# Patient Record
Sex: MALE | Race: BLACK OR AFRICAN AMERICAN | Employment: UNEMPLOYED
[De-identification: names, ages, dates, MRNs, and addresses within clinical notes are randomized per-mention and may not be internally consistent; named-entity substitution may affect disease eponyms.]

---

## 2017-01-10 ENCOUNTER — OFFICE VISIT (OUTPATIENT)
Dept: INTERNAL MEDICINE | Facility: CLINIC | Age: 57
End: 2017-01-10

## 2017-01-10 VITALS
BODY MASS INDEX: 29 KG/M2 | DIASTOLIC BLOOD PRESSURE: 78 MMHG | WEIGHT: 226 LBS | HEART RATE: 56 BPM | OXYGEN SATURATION: 98 % | HEIGHT: 74 IN | SYSTOLIC BLOOD PRESSURE: 120 MMHG

## 2017-01-10 DIAGNOSIS — Z23 NEEDS FLU SHOT: ICD-10-CM

## 2017-01-10 DIAGNOSIS — I10 ESSENTIAL HYPERTENSION: ICD-10-CM

## 2017-01-10 DIAGNOSIS — R73.03 PRE-DIABETES: Primary | ICD-10-CM

## 2017-01-10 PROCEDURE — 90688 IIV4 VACCINE SPLT 0.5 ML IM: CPT | Performed by: NURSE PRACTITIONER

## 2017-01-10 PROCEDURE — 90471 IMMUNIZATION ADMIN: CPT | Performed by: NURSE PRACTITIONER

## 2017-01-10 PROCEDURE — 99212 OFFICE O/P EST SF 10 MIN: CPT | Performed by: NURSE PRACTITIONER

## 2017-01-10 RX ORDER — METOPROLOL TARTRATE 50 MG/1
50 TABLET, FILM COATED ORAL 2 TIMES DAILY
Qty: 180 TABLET | Refills: 5 | Status: SHIPPED | OUTPATIENT
Start: 2017-01-10 | End: 2017-09-20 | Stop reason: SDUPTHER

## 2017-01-10 RX ORDER — AMLODIPINE BESYLATE 10 MG/1
10 TABLET ORAL DAILY
Qty: 90 TABLET | Refills: 5 | Status: SHIPPED | OUTPATIENT
Start: 2017-01-10 | End: 2017-09-20 | Stop reason: SDUPTHER

## 2017-01-10 RX ORDER — HYDROCHLOROTHIAZIDE 50 MG/1
50 TABLET ORAL DAILY
Qty: 90 TABLET | Refills: 5 | Status: SHIPPED | OUTPATIENT
Start: 2017-01-10 | End: 2017-09-20 | Stop reason: SDUPTHER

## 2017-01-10 ASSESSMENT — PATIENT HEALTH QUESTIONNAIRE - PHQ9
2. FEELING DOWN, DEPRESSED OR HOPELESS: 0
1. LITTLE INTEREST OR PLEASURE IN DOING THINGS: 0
SUM OF ALL RESPONSES TO PHQ QUESTIONS 1-9: 0
SUM OF ALL RESPONSES TO PHQ9 QUESTIONS 1 & 2: 0

## 2017-01-10 ASSESSMENT — ENCOUNTER SYMPTOMS: BLURRED VISION: 0

## 2017-02-03 ENCOUNTER — OFFICE VISIT (OUTPATIENT)
Dept: PODIATRY | Facility: CLINIC | Age: 57
End: 2017-02-03

## 2017-02-03 VITALS
HEIGHT: 74 IN | BODY MASS INDEX: 28.62 KG/M2 | WEIGHT: 223 LBS | HEART RATE: 95 BPM | TEMPERATURE: 97.7 F | SYSTOLIC BLOOD PRESSURE: 127 MMHG | DIASTOLIC BLOOD PRESSURE: 75 MMHG

## 2017-02-03 DIAGNOSIS — Q82.8 POROKERATOSIS: ICD-10-CM

## 2017-02-03 DIAGNOSIS — B35.3 TINEA PEDIS OF BOTH FEET: ICD-10-CM

## 2017-02-03 DIAGNOSIS — M79.672 PAIN IN BOTH FEET: ICD-10-CM

## 2017-02-03 DIAGNOSIS — M79.671 PAIN IN BOTH FEET: ICD-10-CM

## 2017-02-03 DIAGNOSIS — L84 CALLUS OF FOOT: Primary | ICD-10-CM

## 2017-02-03 PROCEDURE — 99213 OFFICE O/P EST LOW 20 MIN: CPT | Performed by: PODIATRIST

## 2017-02-03 RX ORDER — KETOCONAZOLE 20 MG/G
CREAM TOPICAL
Qty: 30 G | Refills: 1 | Status: SHIPPED | OUTPATIENT
Start: 2017-02-03 | End: 2017-11-13 | Stop reason: ALTCHOICE

## 2017-02-21 DIAGNOSIS — I10 ESSENTIAL HYPERTENSION: ICD-10-CM

## 2017-02-21 RX ORDER — HYDROCHLOROTHIAZIDE 50 MG/1
TABLET ORAL
Qty: 90 TABLET | Refills: 2 | Status: SHIPPED | OUTPATIENT
Start: 2017-02-21 | End: 2017-06-09 | Stop reason: SDUPTHER

## 2017-06-09 ENCOUNTER — PROCEDURE VISIT (OUTPATIENT)
Dept: PODIATRY | Age: 57
End: 2017-06-09
Payer: MEDICAID

## 2017-06-09 VITALS
TEMPERATURE: 97.8 F | HEIGHT: 74 IN | SYSTOLIC BLOOD PRESSURE: 122 MMHG | DIASTOLIC BLOOD PRESSURE: 60 MMHG | WEIGHT: 200 LBS | BODY MASS INDEX: 25.67 KG/M2 | HEART RATE: 57 BPM

## 2017-06-09 DIAGNOSIS — M79.672 PAIN IN BOTH FEET: ICD-10-CM

## 2017-06-09 DIAGNOSIS — L84 HELOMA MOLLE: ICD-10-CM

## 2017-06-09 DIAGNOSIS — L84 CALLUS OF FOOT: ICD-10-CM

## 2017-06-09 DIAGNOSIS — M79.671 PAIN IN BOTH FEET: ICD-10-CM

## 2017-06-09 DIAGNOSIS — M20.11 HAV (HALLUX ABDUCTO VALGUS), RIGHT: Primary | ICD-10-CM

## 2017-06-09 DIAGNOSIS — Z87.39 S/P HAMMER TOE CORRECTION: ICD-10-CM

## 2017-06-09 DIAGNOSIS — M20.12 HAV (HALLUX ABDUCTO VALGUS), LEFT: ICD-10-CM

## 2017-06-09 DIAGNOSIS — Z98.890 S/P HAMMER TOE CORRECTION: ICD-10-CM

## 2017-06-09 PROCEDURE — 11056 PARNG/CUTG B9 HYPRKR LES 2-4: CPT | Performed by: PODIATRIST

## 2017-06-09 PROCEDURE — 99213 OFFICE O/P EST LOW 20 MIN: CPT | Performed by: PODIATRIST

## 2017-06-09 RX ORDER — UBIDECARENONE 75 MG
1000 CAPSULE ORAL DAILY
COMMUNITY
End: 2017-11-13 | Stop reason: ALTCHOICE

## 2017-06-12 ENCOUNTER — HOSPITAL ENCOUNTER (OUTPATIENT)
Age: 57
Discharge: HOME | End: 2017-06-12
Payer: MEDICAID

## 2017-06-12 ENCOUNTER — HOSPITAL ENCOUNTER (OUTPATIENT)
Dept: GENERAL RADIOLOGY | Age: 57
Discharge: HOME | End: 2017-06-12
Payer: MEDICAID

## 2017-06-12 DIAGNOSIS — R52 PAIN: ICD-10-CM

## 2017-06-12 DIAGNOSIS — M20.11 HAV (HALLUX ABDUCTO VALGUS), RIGHT: ICD-10-CM

## 2017-06-12 DIAGNOSIS — M79.672 PAIN IN BOTH FEET: ICD-10-CM

## 2017-06-12 DIAGNOSIS — L84 HELOMA MOLLE: ICD-10-CM

## 2017-06-12 DIAGNOSIS — M79.671 PAIN IN BOTH FEET: ICD-10-CM

## 2017-06-12 DIAGNOSIS — M20.12 HAV (HALLUX ABDUCTO VALGUS), LEFT: ICD-10-CM

## 2017-06-12 DIAGNOSIS — L84 CALLUS OF FOOT: ICD-10-CM

## 2017-06-12 DIAGNOSIS — Z98.890 S/P HAMMER TOE CORRECTION: ICD-10-CM

## 2017-06-12 DIAGNOSIS — Z87.39 S/P HAMMER TOE CORRECTION: ICD-10-CM

## 2017-06-12 PROCEDURE — 73630 X-RAY EXAM OF FOOT: CPT

## 2017-07-14 ENCOUNTER — OFFICE VISIT (OUTPATIENT)
Dept: PODIATRY | Age: 57
End: 2017-07-14
Payer: MEDICAID

## 2017-07-14 VITALS
DIASTOLIC BLOOD PRESSURE: 70 MMHG | WEIGHT: 200 LBS | HEART RATE: 52 BPM | SYSTOLIC BLOOD PRESSURE: 109 MMHG | BODY MASS INDEX: 25.67 KG/M2 | HEIGHT: 74 IN

## 2017-07-14 DIAGNOSIS — M20.41 HAMMER TOES OF BOTH FEET: ICD-10-CM

## 2017-07-14 DIAGNOSIS — L84 CALLUS OF FOOT: ICD-10-CM

## 2017-07-14 DIAGNOSIS — M79.671 PAIN IN BOTH FEET: ICD-10-CM

## 2017-07-14 DIAGNOSIS — M20.12 HAV (HALLUX ABDUCTO VALGUS), LEFT: Primary | ICD-10-CM

## 2017-07-14 DIAGNOSIS — M20.42 HAMMER TOES OF BOTH FEET: ICD-10-CM

## 2017-07-14 DIAGNOSIS — E11.9 TYPE 2 DIABETES MELLITUS WITHOUT COMPLICATION, WITHOUT LONG-TERM CURRENT USE OF INSULIN (HCC): ICD-10-CM

## 2017-07-14 DIAGNOSIS — M20.11 HAV (HALLUX ABDUCTO VALGUS), RIGHT: ICD-10-CM

## 2017-07-14 DIAGNOSIS — M79.672 PAIN IN BOTH FEET: ICD-10-CM

## 2017-07-14 PROCEDURE — 11056 PARNG/CUTG B9 HYPRKR LES 2-4: CPT | Performed by: PODIATRIST

## 2017-07-14 PROCEDURE — 99213 OFFICE O/P EST LOW 20 MIN: CPT | Performed by: PODIATRIST

## 2017-07-27 ENCOUNTER — HOSPITAL ENCOUNTER (OUTPATIENT)
Dept: PREADMISSION TESTING | Age: 57
Discharge: HOME | End: 2017-07-27
Payer: MEDICAID

## 2017-07-27 VITALS
BODY MASS INDEX: 26.69 KG/M2 | DIASTOLIC BLOOD PRESSURE: 69 MMHG | TEMPERATURE: 98.4 F | SYSTOLIC BLOOD PRESSURE: 109 MMHG | RESPIRATION RATE: 18 BRPM | HEART RATE: 64 BPM | WEIGHT: 208 LBS | OXYGEN SATURATION: 98 % | HEIGHT: 74 IN

## 2017-07-27 LAB
ANION GAP SERPL CALCULATED.3IONS-SCNC: 20 MMOL/L (ref 9–17)
BUN BLDV-MCNC: 15 MG/DL (ref 6–20)
CHLORIDE BLD-SCNC: 103 MMOL/L (ref 98–107)
CO2: 22 MMOL/L (ref 20–31)
CREAT SERPL-MCNC: 1.27 MG/DL (ref 0.7–1.2)
GFR AFRICAN AMERICAN: >60 ML/MIN
GFR NON-AFRICAN AMERICAN: 58 ML/MIN
GFR SERPL CREATININE-BSD FRML MDRD: ABNORMAL ML/MIN/{1.73_M2}
GFR SERPL CREATININE-BSD FRML MDRD: ABNORMAL ML/MIN/{1.73_M2}
GLUCOSE BLD-MCNC: 94 MG/DL (ref 70–99)
POTASSIUM SERPL-SCNC: 3.8 MMOL/L (ref 3.7–5.3)
SODIUM BLD-SCNC: 145 MMOL/L (ref 135–144)

## 2017-07-27 PROCEDURE — 36415 COLL VENOUS BLD VENIPUNCTURE: CPT

## 2017-07-27 PROCEDURE — 82565 ASSAY OF CREATININE: CPT

## 2017-07-27 PROCEDURE — 80051 ELECTROLYTE PANEL: CPT

## 2017-07-27 PROCEDURE — 82947 ASSAY GLUCOSE BLOOD QUANT: CPT

## 2017-07-27 PROCEDURE — 93005 ELECTROCARDIOGRAM TRACING: CPT

## 2017-07-27 PROCEDURE — 84520 ASSAY OF UREA NITROGEN: CPT

## 2017-07-27 RX ORDER — SODIUM CHLORIDE, SODIUM LACTATE, POTASSIUM CHLORIDE, CALCIUM CHLORIDE 600; 310; 30; 20 MG/100ML; MG/100ML; MG/100ML; MG/100ML
1000 INJECTION, SOLUTION INTRAVENOUS CONTINUOUS
Status: CANCELLED | OUTPATIENT
Start: 2017-07-27

## 2017-07-28 LAB
EKG ATRIAL RATE: 53 BPM
EKG P AXIS: 59 DEGREES
EKG P-R INTERVAL: 180 MS
EKG Q-T INTERVAL: 424 MS
EKG QRS DURATION: 80 MS
EKG QTC CALCULATION (BAZETT): 397 MS
EKG R AXIS: 33 DEGREES
EKG T AXIS: 10 DEGREES
EKG VENTRICULAR RATE: 53 BPM

## 2017-09-20 ENCOUNTER — TELEPHONE (OUTPATIENT)
Dept: INTERNAL MEDICINE | Age: 57
End: 2017-09-20

## 2017-09-20 DIAGNOSIS — R73.03 PRE-DIABETES: ICD-10-CM

## 2017-09-20 DIAGNOSIS — K21.9 GASTROESOPHAGEAL REFLUX DISEASE, ESOPHAGITIS PRESENCE NOT SPECIFIED: ICD-10-CM

## 2017-09-20 DIAGNOSIS — I10 ESSENTIAL HYPERTENSION: ICD-10-CM

## 2017-09-20 NOTE — TELEPHONE ENCOUNTER
----- Message from Phill Colbert sent at 9/20/2017 10:06 AM EDT -----  Contact: patient  Patient is scheduled 11/13 w/ Dr Marie Garcia. He is requesting orders for blood work prior to the appt.     Health Maintenance   Topic Date Due    Hepatitis C screen  1960    HIV screen  08/30/1973    Colon Cancer Screen FIT/FOBT  06/28/2017    Flu vaccine (1) 09/01/2017    Lipid screen  12/14/2020    DTaP/Tdap/Td vaccine (2 - Td) 09/13/2026    Pneumococcal med risk  Completed             (applicable per patient's age: Cancer Screenings, Depression Screening, Fall Risk Screening, Immunizations)    Hemoglobin A1C (%)   Date Value   06/28/2016 6.0   12/08/2015 5.8   04/08/2015 5.8     LDL Cholesterol (mg/dL)   Date Value   12/14/2015 65     AST (U/L)   Date Value   12/14/2015 17     ALT (U/L)   Date Value   12/14/2015 19     BUN (mg/dL)   Date Value   07/27/2017 15      (goal A1C is < 7)   (goal LDL is <100) need 30-50% reduction from baseline     BP Readings from Last 3 Encounters:   07/27/17 109/69   07/14/17 109/70   06/09/17 122/60    (goal /80)      All Future Testing planned in [facility]:  Lab Frequency Next Occurrence       Next Visit Date:  Future Appointments  Date Time Provider Sheldon Eubanks   11/13/2017 1:00 PM Quinten Palma MD [facility]            Patient Active Problem List:     Callus of foot     S/P hammer toe correction     Pre-diabetes     Gastroesophageal reflux disease     Essential hypertension

## 2017-09-21 RX ORDER — HYDROCHLOROTHIAZIDE 50 MG/1
50 TABLET ORAL DAILY
Qty: 90 TABLET | Refills: 5 | Status: SHIPPED | OUTPATIENT
Start: 2017-09-21 | End: 2017-11-13

## 2017-09-21 RX ORDER — AMLODIPINE BESYLATE 10 MG/1
10 TABLET ORAL DAILY
Qty: 90 TABLET | Refills: 5 | Status: SHIPPED | OUTPATIENT
Start: 2017-09-21 | End: 2018-04-30 | Stop reason: SDUPTHER

## 2017-09-21 RX ORDER — METOPROLOL TARTRATE 50 MG/1
50 TABLET, FILM COATED ORAL 2 TIMES DAILY
Qty: 180 TABLET | Refills: 5 | Status: SHIPPED | OUTPATIENT
Start: 2017-09-21 | End: 2018-04-30 | Stop reason: SDUPTHER

## 2017-09-21 RX ORDER — OMEPRAZOLE 10 MG/1
10 CAPSULE, DELAYED RELEASE ORAL DAILY
Qty: 90 CAPSULE | Refills: 5 | Status: SHIPPED | OUTPATIENT
Start: 2017-09-21 | End: 2018-04-30 | Stop reason: SDUPTHER

## 2017-10-06 ENCOUNTER — OFFICE VISIT (OUTPATIENT)
Dept: PODIATRY | Age: 57
End: 2017-10-06
Payer: MEDICAID

## 2017-10-06 VITALS
HEIGHT: 74 IN | BODY MASS INDEX: 26.6 KG/M2 | SYSTOLIC BLOOD PRESSURE: 121 MMHG | HEART RATE: 58 BPM | TEMPERATURE: 97.9 F | WEIGHT: 207.23 LBS | DIASTOLIC BLOOD PRESSURE: 69 MMHG

## 2017-10-06 DIAGNOSIS — Z87.39 S/P HAMMER TOE CORRECTION: ICD-10-CM

## 2017-10-06 DIAGNOSIS — Z98.890 S/P HAMMER TOE CORRECTION: ICD-10-CM

## 2017-10-06 DIAGNOSIS — B35.1 ONYCHOMYCOSIS: ICD-10-CM

## 2017-10-06 DIAGNOSIS — M79.672 PAIN IN BOTH FEET: ICD-10-CM

## 2017-10-06 DIAGNOSIS — L84 HELOMA MOLLE: ICD-10-CM

## 2017-10-06 DIAGNOSIS — L84 CALLUS OF FOOT: ICD-10-CM

## 2017-10-06 DIAGNOSIS — E11.9 TYPE 2 DIABETES MELLITUS WITHOUT COMPLICATION, WITHOUT LONG-TERM CURRENT USE OF INSULIN (HCC): ICD-10-CM

## 2017-10-06 DIAGNOSIS — M20.12 HAV (HALLUX ABDUCTO VALGUS), LEFT: Primary | ICD-10-CM

## 2017-10-06 DIAGNOSIS — M20.41 HAMMER TOES OF BOTH FEET: ICD-10-CM

## 2017-10-06 DIAGNOSIS — M79.671 PAIN IN BOTH FEET: ICD-10-CM

## 2017-10-06 DIAGNOSIS — M20.11 HAV (HALLUX ABDUCTO VALGUS), RIGHT: ICD-10-CM

## 2017-10-06 DIAGNOSIS — M20.42 HAMMER TOES OF BOTH FEET: ICD-10-CM

## 2017-10-06 PROCEDURE — 11721 DEBRIDE NAIL 6 OR MORE: CPT | Performed by: PODIATRIST

## 2017-10-06 PROCEDURE — 99213 OFFICE O/P EST LOW 20 MIN: CPT | Performed by: PODIATRIST

## 2017-10-06 PROCEDURE — 11057 PARNG/CUTG B9 HYPRKR LES >4: CPT | Performed by: PODIATRIST

## 2017-10-06 RX ORDER — UREA 200 MG/G
GEL TOPICAL
Qty: 480 G | Refills: 3 | Status: SHIPPED | OUTPATIENT
Start: 2017-10-06 | End: 2018-01-26 | Stop reason: SDUPTHER

## 2017-10-06 NOTE — PROGRESS NOTES
Diabetic visit information    Blood pressure (Control is BP <140/90)  BP Readings from Last 3 Encounters:   10/06/17 121/69   07/27/17 109/69   07/14/17 109/70       BP taken with correct size cuff? - Yes   Repeated if > 140/90 NA      Tobacco use:  Patient  reports that he has been smoking. He has never used smokeless tobacco.  If Smoker - Cessation materials given?- NA       Diabetic Health Maintenance Items due  There are no preventive care reminders to display for this patient. Diabetic retinal exam done in last year? - Yes   If No: remind patient that it is due and they should schedule an exam    Medications  Is patient taking any medications for diabetes? -   Yes  Have blood sugars been controlled? Fasting blood sugars under 120   -   Patient don't check BS at home    Random home sugars or today's POCT glucose is under 180 -   No   []  If No to the above then patient should schedule appt with PCP. Diabetic Plan    A1C Plan  Lab Results   Component Value Date    LABA1C 6.0 06/28/2016    LABA1C 5.8 12/08/2015    LABA1C 5.8 04/08/2015      []  If A1C over 8 and last result >3 months ago - Order A1C and refer to PCP   [x]  If last A1C over 6 months ago - Order A1C and refer to PCP for follow up   []  If elevated blood sugars > 180 - refer to PCP for follow up    []  Blood sugar controlled - A1C under 8 and last check was < 6 months      Cholesterol Plan   Lab Results   Component Value Date    LDLCHOLESTEROL 65 12/14/2015      []  If LDL > 100 and last result >3 months ago - order Fasting lipids and refer to PCP for follow up   [x]  If LDL < 100 and over 1 year ago - Order Fasting lipids and refer to PCP for follow up   [] LDL is controlled.   LDL < 100 and checked within the last year     Blood Pressure  BP Readings from Last 3 Encounters:   10/06/17 121/69   07/27/17 109/69   07/14/17 109/70      []  If SBP >140 mmhg - refer to PCP for follow up   []  If DBP > 90 mmhg - refer to PCP for follow up   [x] BP is controlled <140/90     Order labs as PCP ordered.   (ie: Lipids, A1C, CMP)

## 2017-10-06 NOTE — MR AVS SNAPSHOT
Tdap (Boostrix, Adacel) 9/13/2016      Preventive Care        Date Due    Hepatitis C screening is recommended for all adults regardless of risk factors born between [facility] at least once (lifetime) who have never been tested. 1960    HIV screening is recommended for all people regardless of risk factors  aged 15-65 years at least once (lifetime) who have never been HIV tested. 8/30/1973    Colon Cancer Stool Test 6/28/2017    Yearly Flu Vaccine (1) 9/1/2017    Cholesterol Screening 12/14/2020    Tetanus Combination Vaccine (2 - Td) 9/13/2026            [employer] Signup           Our records indicate that you have an active [email or web address] account. You can view your After Visit Summary by going to [email or web address] and logging in with your [email or web address] username and password. If you don't have a [email or web address] username and password but a parent or guardian has access to your record, the parent or guardian should login with their own [email or web address] username and password and access your record to view the After Visit Summary. Additional Information  If you have questions, please contact the physician practice where you receive care. Remember, [email or web address] is NOT to be used for urgent needs. For medical emergencies, dial 911. For questions regarding your [email or web address] account call [phone]. If you have a clinical question, please call your doctor's office.

## 2017-10-06 NOTE — PROGRESS NOTES
[facility] Progress Note    Subjective: Leticia Carias is a 59 y.o. male presenting to clinic complaining of painful nails and calluses to b/l feet. States he had Left bunion and hammertoe surgery circa 2010. He states he would like surgical revision by Dr. Saira Andrews in a few months once his life slows down. He states he uses orthotics but that he still has pain. He has another callus that is on the inside of his second toe and big toe. Denies numbness, tingling, and burning in the hands and feet. Denies cramping in the calves with walking. Denies open wounds. Admits to smoking cigarettes about 1 pack over 7-days. Patient states he has been apply moisterizer cream and antifungal nail lacquer and requests refills. PCP is Yuliya Smith MD    Objective:  Vitals:    10/06/17 1505   BP: 121/69   Pulse: 58   Temp: 97.9 °F (36.6 °C)     Derm: Hyperkeratotic tissue noted sub 2nd and 3rd met heads, R and sub 3rd met head left. Previous cicatrix to dorsal first met and to the dorsal aspects of digits 2-4, devante. Nails normal in length, however thickened 1-5, devante. No open lesions noted. Callus at medial IPJ of 2nd digit. Vasc: DP and PT pulses are palpable 2/4, devante. CFT <3 seconds to the digits, devante. No edema or erythema noted, devante. Neuro: Sensation intact to light touch and protective sensation present bilaterally. MSK: Muscle strength 5/5 to all LE muscle groups. Pain on palpation to thickened hyperkeratotic tissue sub 2-3 met heads, R and 3rd met head, L. Dorsally contracted digits 2-5 on the right. 2nd digit overlapping hallux on the right with painful calluses between. Lab Results   Component Value Date    LABA1C 6.0 06/28/2016     Lab Results   Component Value Date     04/08/2015     Assessment:  1. HAV (hallux abducto valgus), left     2. Callus of foot  TRIM BENIGN HYPERKERATOTIC SKIN LESION,>4   3. Hammer toes of both feet     4.  Pain in both feet  00783 - [place] DEBRIDEMENT OF NAILS, 6 OR MORE    TRIM BENIGN HYPERKERATOTIC SKIN LESION,>4   5. HAV (hallux abducto valgus), right     6. Type 2 diabetes mellitus without complication, without long-term current use of insulin (HCC)     7. S/P hammer toe correction     8. Heloma molle  TRIM BENIGN HYPERKERATOTIC SKIN LESION,>4   9. Onychomycosis  53571 - [place] DEBRIDEMENT OF NAILS, 6 OR MORE       Plan:   · Pt was evaluated and examined. · Trimmed hyperkeratotic lesions x 4 with #10 blade without incident. · Debrided nails 1-5 bilateral in length and thickness WOI  · Rx ciclopirox and urea cream  · Discussed smoking cessation prior to elective bunion and hammertoe surgery  · RTC 3 months   · Discussed with Dr. Des Dailey    Electronically signed by Chu Franco DPM on 10/6/2017 at 3:15 PM      I performed a history and physical examination of the patient and discussed management with the resident. I reviewed the residents note and agree with the documented findings and plan of care. Any areas of disagreement are noted on the chart. I was personally present for the key portions of any procedures. I have documented in the chart those procedures where I was not present during the key portions. I have reviewed the Podiatry Resident progress note. I agree with the chief complaint, past medical history, past surgical history, allergies, medications, social and family history as documented unless otherwise noted below. Documentation of the HPI, Physical Exam and Medical Decision Making performed by medical students or scribes is based on my personal performance of the HPI, PE and MDM. I have personally evaluated this patient and have completed at least one if not all key elements of the E/M (history, physical exam, and MDM). Additional findings are as noted. Bhanu Momin D.P.M.

## 2017-11-13 ENCOUNTER — OFFICE VISIT (OUTPATIENT)
Dept: INTERNAL MEDICINE | Age: 57
End: 2017-11-13
Payer: MEDICAID

## 2017-11-13 VITALS
HEART RATE: 59 BPM | DIASTOLIC BLOOD PRESSURE: 78 MMHG | BODY MASS INDEX: 28.29 KG/M2 | WEIGHT: 220.4 LBS | SYSTOLIC BLOOD PRESSURE: 121 MMHG

## 2017-11-13 DIAGNOSIS — Z23 NEEDS FLU SHOT: ICD-10-CM

## 2017-11-13 DIAGNOSIS — Z11.59 NEED FOR HEPATITIS C SCREENING TEST: ICD-10-CM

## 2017-11-13 DIAGNOSIS — I10 ESSENTIAL HYPERTENSION: ICD-10-CM

## 2017-11-13 DIAGNOSIS — Z12.11 COLON CANCER SCREENING: ICD-10-CM

## 2017-11-13 DIAGNOSIS — R73.03 PRE-DIABETES: Primary | ICD-10-CM

## 2017-11-13 DIAGNOSIS — Z11.4 SCREENING FOR HIV WITHOUT PRESENCE OF RISK FACTORS: ICD-10-CM

## 2017-11-13 DIAGNOSIS — Z13.220 SCREENING FOR HYPERLIPIDEMIA: ICD-10-CM

## 2017-11-13 LAB — HBA1C MFR BLD: 6.1 %

## 2017-11-13 PROCEDURE — 3017F COLORECTAL CA SCREEN DOC REV: CPT | Performed by: STUDENT IN AN ORGANIZED HEALTH CARE EDUCATION/TRAINING PROGRAM

## 2017-11-13 PROCEDURE — 4004F PT TOBACCO SCREEN RCVD TLK: CPT | Performed by: STUDENT IN AN ORGANIZED HEALTH CARE EDUCATION/TRAINING PROGRAM

## 2017-11-13 PROCEDURE — 83036 HEMOGLOBIN GLYCOSYLATED A1C: CPT | Performed by: INTERNAL MEDICINE

## 2017-11-13 PROCEDURE — 90471 IMMUNIZATION ADMIN: CPT | Performed by: STUDENT IN AN ORGANIZED HEALTH CARE EDUCATION/TRAINING PROGRAM

## 2017-11-13 PROCEDURE — 90688 IIV4 VACCINE SPLT 0.5 ML IM: CPT | Performed by: STUDENT IN AN ORGANIZED HEALTH CARE EDUCATION/TRAINING PROGRAM

## 2017-11-13 PROCEDURE — 99214 OFFICE O/P EST MOD 30 MIN: CPT | Performed by: STUDENT IN AN ORGANIZED HEALTH CARE EDUCATION/TRAINING PROGRAM

## 2017-11-13 PROCEDURE — G8484 FLU IMMUNIZE NO ADMIN: HCPCS | Performed by: STUDENT IN AN ORGANIZED HEALTH CARE EDUCATION/TRAINING PROGRAM

## 2017-11-13 PROCEDURE — G8427 DOCREV CUR MEDS BY ELIG CLIN: HCPCS | Performed by: STUDENT IN AN ORGANIZED HEALTH CARE EDUCATION/TRAINING PROGRAM

## 2017-11-13 PROCEDURE — G8417 CALC BMI ABV UP PARAM F/U: HCPCS | Performed by: STUDENT IN AN ORGANIZED HEALTH CARE EDUCATION/TRAINING PROGRAM

## 2017-11-13 RX ORDER — BLOOD PRESSURE TEST KIT
KIT MISCELLANEOUS
Qty: 1 KIT | Refills: 0 | Status: SHIPPED | OUTPATIENT
Start: 2017-11-13 | End: 2019-01-18

## 2017-11-13 NOTE — PATIENT INSTRUCTIONS
Printed script for blood pressure cuff given to patient with a copy of the office notes      Your medications for this visit were escribed to your preferred pharmacy. You have been given a lab order no need to schedule please fast 12 hours     Your referral for Gastroenterology (Colonoscopy)was sent to [facility] clinic you will be contacted with an appt     Avs was given and reviewed appt card given with next appt.  MM

## 2017-11-13 NOTE — PROGRESS NOTES
Visit Information    Have you changed or started any medications since your last visit including any over-the-counter medicines, vitamins, or herbal medicines? no   Are you having any side effects from any of your medications? -  no  Have you stopped taking any of your medications? Is so, why? -  no    Have you seen any other physician or provider since your last visit? Yes - Records Obtained  Have you had any other diagnostic tests since your last visit? Yes - Records Obtained  Have you been seen in the emergency room and/or had an admission to a hospital since we last saw you? No  Have you had your routine dental cleaning in the past 6 months? no    Have you activated your [employer] account? If not, what are your barriers?  Yes     Patient Care Team:  Les Jamison MD as PCP - General (Internal Medicine)    Medical History Review  Past Medical, Family, and Social History reviewed and does contribute to the patient presenting condition    Health Maintenance   Topic Date Due    Hepatitis C screen  1960    Diabetic foot exam  08/30/1968    Diabetic retinal exam  08/30/1968    HIV screen  08/30/1973    Diabetic microalbuminuria test  08/30/1976    Lipid screen  12/14/2016    Diabetic hemoglobin A1C test  06/28/2017    Colon Cancer Screen FIT/FOBT  06/28/2017    Flu vaccine (1) 09/01/2017    DTaP/Tdap/Td vaccine (2 - Td) 09/13/2026    Pneumococcal med risk  Completed
editing.
S2, no murmurs, rubs, clicks or gallops  Abdomen - soft, nontender, nondistended, no masses or organomegaly  Neurological - alert, oriented, normal speech, no focal findings or movement disorder noted  Extremities - peripheral pulses normal, no pedal edema, no clubbing or cyanosis  Skin - normal coloration and turgor, no rashes, no suspicious skin lesions noted    LABORATORY FINDINGS:    CBC:  Lab Results   Component Value Date    WBC 4.3 04/08/2015    HGB 14.0 04/08/2015     04/08/2015       BMP:    Lab Results   Component Value Date     07/27/2017    K 3.8 07/27/2017     07/27/2017    CO2 22 07/27/2017    BUN 15 07/27/2017    CREATININE 1.27 07/27/2017    GLUCOSE 94 07/27/2017    GLUCOSE 89 02/02/2012       HEMOGLOBIN A1C:   Lab Results   Component Value Date    LABA1C 6.1 11/13/2017       FASTING LIPID PANEL:  Lab Results   Component Value Date    CHOL 118 12/14/2015    HDL 38 (L) 12/14/2015    TRIG 75 12/14/2015       ASSESSMENT AND PLAN:    rBeana Woodward was seen today for hypertension, health maintenance and discuss medications. Diagnoses and all orders for this visit:    Pre-diabetes  -     POCT glycosylated hemoglobin (Hb A1C)  -      DIABETES FOOT EXAM  -     Microalbumin, Ur; Future  -     Basic Metabolic Panel; Future  -     CBC; Future  -     Vitamin B12 & Folate; Future    Needs flu shot  -     INFLUENZA, QUADV, 6 MO AND OLDER, IM, MDV, 0.5ML (FLULAVAL QUADV)  -     [place] ADMIN INFLUENZA VIRUS VAC    Screening for HIV without presence of risk factors  -     HIV Screen; Future    Need for hepatitis C screening test  -     Hepatitis C Antibody; Future    Screening for hyperlipidemia  -     Lipid Panel; Future    Essential hypertension       -     Continue lisinopril, metoprolol . D/c HCTZ. Blood pressure machine to check his blood pressure. Patient was advised to keep checking BP and if SBP more than 150/90 to call our office.        Screening colonoscopy     FOLLOW UP AND INSTRUCTIONS:   No

## 2017-11-20 ENCOUNTER — HOSPITAL ENCOUNTER (OUTPATIENT)
Age: 57
Discharge: HOME | End: 2017-11-20
Payer: MEDICAID

## 2017-11-20 DIAGNOSIS — R73.03 PRE-DIABETES: ICD-10-CM

## 2017-11-20 DIAGNOSIS — Z13.220 SCREENING FOR HYPERLIPIDEMIA: ICD-10-CM

## 2017-11-20 DIAGNOSIS — Z11.4 SCREENING FOR HIV WITHOUT PRESENCE OF RISK FACTORS: ICD-10-CM

## 2017-11-20 DIAGNOSIS — Z11.59 NEED FOR HEPATITIS C SCREENING TEST: ICD-10-CM

## 2017-11-20 LAB
ANION GAP SERPL CALCULATED.3IONS-SCNC: 13 MMOL/L (ref 9–17)
BUN BLDV-MCNC: 9 MG/DL (ref 6–20)
BUN/CREAT BLD: NORMAL (ref 9–20)
CALCIUM SERPL-MCNC: 9.6 MG/DL (ref 8.6–10.4)
CHLORIDE BLD-SCNC: 105 MMOL/L (ref 98–107)
CHOLESTEROL/HDL RATIO: 3.2
CHOLESTEROL: 139 MG/DL
CO2: 24 MMOL/L (ref 20–31)
CREAT SERPL-MCNC: 1.19 MG/DL (ref 0.7–1.2)
CREATININE URINE: 225.5 MG/DL (ref 39–259)
FOLATE: 15 NG/ML
GFR AFRICAN AMERICAN: >60 ML/MIN
GFR NON-AFRICAN AMERICAN: >60 ML/MIN
GFR SERPL CREATININE-BSD FRML MDRD: NORMAL ML/MIN/{1.73_M2}
GFR SERPL CREATININE-BSD FRML MDRD: NORMAL ML/MIN/{1.73_M2}
GLUCOSE BLD-MCNC: 85 MG/DL (ref 70–99)
HCT VFR BLD CALC: 42.8 % (ref 40.7–50.3)
HDLC SERPL-MCNC: 43 MG/DL
HEMOGLOBIN: 14.2 G/DL (ref 13–17)
HEPATITIS C ANTIBODY: NONREACTIVE
HIV AG/AB: NONREACTIVE
LDL CHOLESTEROL: 78 MG/DL (ref 0–130)
MCH RBC QN AUTO: 28.9 PG (ref 25.2–33.5)
MCHC RBC AUTO-ENTMCNC: 33.2 G/DL (ref 28.4–34.8)
MCV RBC AUTO: 87 FL (ref 82.6–102.9)
MICROALBUMIN/CREAT 24H UR: <12 MG/L
MICROALBUMIN/CREAT UR-RTO: NORMAL MCG/MG CREAT
PDW BLD-RTO: 12.9 % (ref 11.8–14.4)
PLATELET # BLD: 208 K/UL (ref 138–453)
PMV BLD AUTO: 9.2 FL (ref 8.1–13.5)
POTASSIUM SERPL-SCNC: 3.7 MMOL/L (ref 3.7–5.3)
RBC # BLD: 4.92 M/UL (ref 4.21–5.77)
SODIUM BLD-SCNC: 142 MMOL/L (ref 135–144)
TRIGL SERPL-MCNC: 91 MG/DL
VITAMIN B-12: 966 PG/ML (ref 211–946)
VLDLC SERPL CALC-MCNC: NORMAL MG/DL (ref 1–30)
WBC # BLD: 3.6 K/UL (ref 3.5–11.3)

## 2017-11-20 PROCEDURE — 80048 BASIC METABOLIC PNL TOTAL CA: CPT

## 2017-11-20 PROCEDURE — 86803 HEPATITIS C AB TEST: CPT

## 2017-11-20 PROCEDURE — 82746 ASSAY OF FOLIC ACID SERUM: CPT

## 2017-11-20 PROCEDURE — 82043 UR ALBUMIN QUANTITATIVE: CPT

## 2017-11-20 PROCEDURE — 87389 HIV-1 AG W/HIV-1&-2 AB AG IA: CPT

## 2017-11-20 PROCEDURE — 36415 COLL VENOUS BLD VENIPUNCTURE: CPT

## 2017-11-20 PROCEDURE — 82607 VITAMIN B-12: CPT

## 2017-11-20 PROCEDURE — 85027 COMPLETE CBC AUTOMATED: CPT

## 2017-11-20 PROCEDURE — 80061 LIPID PANEL: CPT

## 2017-11-20 PROCEDURE — 82570 ASSAY OF URINE CREATININE: CPT

## 2018-01-26 ENCOUNTER — OFFICE VISIT (OUTPATIENT)
Dept: PODIATRY | Age: 58
End: 2018-01-26
Payer: MEDICAID

## 2018-01-26 VITALS
WEIGHT: 200 LBS | SYSTOLIC BLOOD PRESSURE: 128 MMHG | BODY MASS INDEX: 25.67 KG/M2 | HEART RATE: 60 BPM | DIASTOLIC BLOOD PRESSURE: 70 MMHG | HEIGHT: 74 IN

## 2018-01-26 DIAGNOSIS — B35.1 ONYCHOMYCOSIS: ICD-10-CM

## 2018-01-26 DIAGNOSIS — M79.672 PAIN IN BOTH FEET: ICD-10-CM

## 2018-01-26 DIAGNOSIS — M20.41 HAMMER TOES OF BOTH FEET: ICD-10-CM

## 2018-01-26 DIAGNOSIS — M20.42 HAMMER TOES OF BOTH FEET: ICD-10-CM

## 2018-01-26 DIAGNOSIS — E11.9 TYPE 2 DIABETES MELLITUS WITHOUT COMPLICATION, WITHOUT LONG-TERM CURRENT USE OF INSULIN (HCC): ICD-10-CM

## 2018-01-26 DIAGNOSIS — M20.12 HAV (HALLUX ABDUCTO VALGUS), LEFT: ICD-10-CM

## 2018-01-26 DIAGNOSIS — M20.11 HAV (HALLUX ABDUCTO VALGUS), RIGHT: ICD-10-CM

## 2018-01-26 DIAGNOSIS — L84 CALLUS OF FOOT: Primary | ICD-10-CM

## 2018-01-26 DIAGNOSIS — M79.671 PAIN IN BOTH FEET: ICD-10-CM

## 2018-01-26 PROCEDURE — G8482 FLU IMMUNIZE ORDER/ADMIN: HCPCS | Performed by: PODIATRIST

## 2018-01-26 PROCEDURE — 99213 OFFICE O/P EST LOW 20 MIN: CPT | Performed by: PODIATRIST

## 2018-01-26 PROCEDURE — 3046F HEMOGLOBIN A1C LEVEL >9.0%: CPT | Performed by: PODIATRIST

## 2018-01-26 PROCEDURE — G8417 CALC BMI ABV UP PARAM F/U: HCPCS | Performed by: PODIATRIST

## 2018-01-26 PROCEDURE — 11721 DEBRIDE NAIL 6 OR MORE: CPT | Performed by: PODIATRIST

## 2018-01-26 PROCEDURE — G8427 DOCREV CUR MEDS BY ELIG CLIN: HCPCS | Performed by: PODIATRIST

## 2018-01-26 PROCEDURE — 4004F PT TOBACCO SCREEN RCVD TLK: CPT | Performed by: PODIATRIST

## 2018-01-26 PROCEDURE — 3017F COLORECTAL CA SCREEN DOC REV: CPT | Performed by: PODIATRIST

## 2018-01-26 RX ORDER — UREA 200 MG/G
GEL TOPICAL
Qty: 480 G | Refills: 3 | Status: SHIPPED | OUTPATIENT
Start: 2018-01-26 | End: 2020-11-17

## 2018-01-26 NOTE — PROGRESS NOTES
Patient instructed to remove shoes and socks, instructed to sit in exam chair. Current PCP name is Dr. Frank Mccall and date of last visit November 2017. Do you have a follow up visit scheduled? Yes If yes the date is 2/5  Diabetic visit information    Blood pressure (Control is BP <140/90)  BP Readings from Last 3 Encounters:   01/26/18 128/70   11/13/17 121/78   10/06/17 121/69       BP taken with correct size cuff? - Yes   Repeated if > 140/90 NA      Tobacco use:  Patient  reports that he has been smoking. He has never used smokeless tobacco.  If Smoker - Cessation materials given?- No       Diabetic Health Maintenance Items due  Diabetes Management   Topic Date Due    Diabetic foot exam  08/30/1968    Diabetic retinal exam  08/30/1968       Diabetic retinal exam done in last year? - Yes   If No: remind patient that it is due and they should schedule an exam    Medications  Is patient taking any medications for diabetes? -   Yes  Have blood sugars been controlled? Fasting blood sugars under 120   -   Patient does not check blood sugars   Random home sugars or today's POCT glucose is under 180 -   Patient does not check blood sugars    []  If No to the above then patient should schedule appt with PCP.      Diabetic Plan    A1C Plan  Lab Results   Component Value Date    LABA1C 6.1 11/13/2017    LABA1C 6.0 06/28/2016    LABA1C 5.8 12/08/2015      []  If A1C over 8 and last result >3 months ago - Order A1C and refer to PCP   []  If last A1C over 6 months ago - Order A1C and refer to PCP for follow up   []  If elevated blood sugars > 180 - refer to PCP for follow up    []  Blood sugar controlled - A1C under 8 and last check was < 6 months      Cholesterol Plan   Lab Results   Component Value Date    LDLCHOLESTEROL 78 11/20/2017      []  If LDL > 100 and last result >3 months ago - order Fasting lipids and refer to PCP for follow up   []  If LDL < 100 and over 1 year ago - Order Fasting lipids and refer to PCP for follow up   [] LDL is controlled. LDL < 100 and checked within the last year     Blood Pressure  BP Readings from Last 3 Encounters:   01/26/18 128/70   11/13/17 121/78   10/06/17 121/69      []  If SBP >140 mmhg - refer to PCP for follow up   []  If DBP > 90 mmhg - refer to PCP for follow up   [] BP is controlled <140/90     Order labs as PCP ordered.   (ie: Lipids, A1C, CMP)

## 2018-04-27 ENCOUNTER — OFFICE VISIT (OUTPATIENT)
Dept: PODIATRY | Age: 58
End: 2018-04-27
Payer: MEDICAID

## 2018-04-27 VITALS
SYSTOLIC BLOOD PRESSURE: 112 MMHG | BODY MASS INDEX: 26.56 KG/M2 | WEIGHT: 207 LBS | HEIGHT: 74 IN | DIASTOLIC BLOOD PRESSURE: 72 MMHG | HEART RATE: 63 BPM

## 2018-04-27 DIAGNOSIS — M79.672 PAIN IN BOTH FEET: ICD-10-CM

## 2018-04-27 DIAGNOSIS — L84 HELOMA MOLLE: ICD-10-CM

## 2018-04-27 DIAGNOSIS — Q82.8 POROKERATOSIS: Primary | ICD-10-CM

## 2018-04-27 DIAGNOSIS — M20.41 HAMMER TOES OF BOTH FEET: ICD-10-CM

## 2018-04-27 DIAGNOSIS — M79.671 PAIN IN BOTH FEET: ICD-10-CM

## 2018-04-27 DIAGNOSIS — M20.42 HAMMER TOES OF BOTH FEET: ICD-10-CM

## 2018-04-27 DIAGNOSIS — M20.11 HAV (HALLUX ABDUCTO VALGUS), RIGHT: ICD-10-CM

## 2018-04-27 DIAGNOSIS — M20.12 HAV (HALLUX ABDUCTO VALGUS), LEFT: ICD-10-CM

## 2018-04-27 PROCEDURE — 3017F COLORECTAL CA SCREEN DOC REV: CPT | Performed by: PODIATRIST

## 2018-04-27 PROCEDURE — G8417 CALC BMI ABV UP PARAM F/U: HCPCS | Performed by: PODIATRIST

## 2018-04-27 PROCEDURE — G8427 DOCREV CUR MEDS BY ELIG CLIN: HCPCS | Performed by: PODIATRIST

## 2018-04-27 PROCEDURE — 11056 PARNG/CUTG B9 HYPRKR LES 2-4: CPT | Performed by: PODIATRIST

## 2018-04-27 PROCEDURE — 99213 OFFICE O/P EST LOW 20 MIN: CPT | Performed by: PODIATRIST

## 2018-04-27 PROCEDURE — 4004F PT TOBACCO SCREEN RCVD TLK: CPT | Performed by: PODIATRIST

## 2018-04-27 RX ORDER — OXYCODONE HYDROCHLORIDE AND ACETAMINOPHEN 5; 325 MG/1; MG/1
TABLET ORAL
COMMUNITY
End: 2019-01-18 | Stop reason: ALTCHOICE

## 2018-04-30 ENCOUNTER — OFFICE VISIT (OUTPATIENT)
Dept: INTERNAL MEDICINE | Age: 58
End: 2018-04-30
Payer: MEDICAID

## 2018-04-30 VITALS
WEIGHT: 206 LBS | HEART RATE: 50 BPM | SYSTOLIC BLOOD PRESSURE: 136 MMHG | HEIGHT: 74 IN | DIASTOLIC BLOOD PRESSURE: 78 MMHG | BODY MASS INDEX: 26.44 KG/M2

## 2018-04-30 DIAGNOSIS — I10 ESSENTIAL HYPERTENSION: Primary | ICD-10-CM

## 2018-04-30 DIAGNOSIS — Z23 NEED FOR SHINGLES VACCINE: ICD-10-CM

## 2018-04-30 DIAGNOSIS — K21.9 GASTROESOPHAGEAL REFLUX DISEASE, ESOPHAGITIS PRESENCE NOT SPECIFIED: ICD-10-CM

## 2018-04-30 DIAGNOSIS — Z12.11 COLON CANCER SCREENING: ICD-10-CM

## 2018-04-30 DIAGNOSIS — R73.03 PRE-DIABETES: ICD-10-CM

## 2018-04-30 DIAGNOSIS — Z23 NEED FOR PROPHYLACTIC VACCINATION AND INOCULATION AGAINST VARICELLA: ICD-10-CM

## 2018-04-30 DIAGNOSIS — F17.200 SMOKER: ICD-10-CM

## 2018-04-30 PROCEDURE — 99213 OFFICE O/P EST LOW 20 MIN: CPT

## 2018-04-30 PROCEDURE — 99213 OFFICE O/P EST LOW 20 MIN: CPT | Performed by: STUDENT IN AN ORGANIZED HEALTH CARE EDUCATION/TRAINING PROGRAM

## 2018-04-30 RX ORDER — OMEPRAZOLE 10 MG/1
10 CAPSULE, DELAYED RELEASE ORAL DAILY
Qty: 90 CAPSULE | Refills: 1 | Status: SHIPPED | OUTPATIENT
Start: 2018-04-30 | End: 2019-04-18 | Stop reason: SDUPTHER

## 2018-04-30 RX ORDER — METOPROLOL TARTRATE 50 MG/1
50 TABLET, FILM COATED ORAL 2 TIMES DAILY
Qty: 180 TABLET | Refills: 1 | Status: SHIPPED | OUTPATIENT
Start: 2018-04-30 | End: 2019-04-18 | Stop reason: SDUPTHER

## 2018-04-30 RX ORDER — AMLODIPINE BESYLATE 10 MG/1
10 TABLET ORAL DAILY
Qty: 90 TABLET | Refills: 1 | Status: SHIPPED | OUTPATIENT
Start: 2018-04-30 | End: 2019-04-18 | Stop reason: SDUPTHER

## 2018-04-30 RX ORDER — METOPROLOL TARTRATE 50 MG/1
50 TABLET, FILM COATED ORAL 2 TIMES DAILY
Qty: 180 TABLET | Refills: 5 | Status: CANCELLED | OUTPATIENT
Start: 2018-04-30

## 2018-04-30 RX ORDER — AMLODIPINE BESYLATE 10 MG/1
10 TABLET ORAL DAILY
Qty: 90 TABLET | Refills: 5 | Status: CANCELLED | OUTPATIENT
Start: 2018-04-30

## 2018-04-30 ASSESSMENT — PATIENT HEALTH QUESTIONNAIRE - PHQ9
SUM OF ALL RESPONSES TO PHQ9 QUESTIONS 1 & 2: 0
2. FEELING DOWN, DEPRESSED OR HOPELESS: 0
1. LITTLE INTEREST OR PLEASURE IN DOING THINGS: 0
SUM OF ALL RESPONSES TO PHQ QUESTIONS 1-9: 0

## 2018-06-01 ENCOUNTER — TELEPHONE (OUTPATIENT)
Dept: INTERNAL MEDICINE | Age: 58
End: 2018-06-01

## 2018-06-07 DIAGNOSIS — Z12.11 SCREENING FOR COLON CANCER: Primary | ICD-10-CM

## 2018-06-08 LAB
CONTROL: NORMAL
HEMOCCULT STL QL: NORMAL

## 2018-06-11 RX ORDER — POLYETHYLENE GLYCOL 3350 17 G/17G
POWDER, FOR SOLUTION ORAL
Qty: 255 G | Refills: 0 | Status: SHIPPED | OUTPATIENT
Start: 2018-06-11 | End: 2019-01-18 | Stop reason: ALTCHOICE

## 2018-06-12 ENCOUNTER — HOSPITAL ENCOUNTER (OUTPATIENT)
Age: 58
Discharge: HOME | End: 2018-06-12
Payer: MEDICAID

## 2018-06-12 DIAGNOSIS — Z12.11 COLON CANCER SCREENING: Primary | ICD-10-CM

## 2018-06-12 PROCEDURE — 82274 ASSAY TEST FOR BLOOD FECAL: CPT | Performed by: INTERNAL MEDICINE

## 2018-07-11 ENCOUNTER — TELEPHONE (OUTPATIENT)
Dept: GASTROENTEROLOGY | Age: 58
End: 2018-07-11

## 2018-07-11 NOTE — TELEPHONE ENCOUNTER
Writer received message from [facility] surgery informing us that a woman was calling on behalf of patient to cx procedure from # [phone]. Writer calls number and woman refuses to give her name, but states that patient will not be at his procedure tomorrow and did not give clear reason as to why. Writer also calls patient phone and leaves VM asking to call the office. Procedure for tomorrow is now cancelled.

## 2018-07-11 NOTE — TELEPHONE ENCOUNTER
A WOMAN CALLED IN TO CX PATIENT'S PROCEDURE FOR TOMORROW STATED THAT IF HE SHOWS THAT IT WOULD BE A MIRACLE. I KINDLY EXPLAINED THAT WE DID NOT HAVE HER LISTED ON HIPPA AND THAT PATIENT HAS TO CONTACT THE OFFICE. SHE THEN STATED THAT SHE DID NOT CARE SHE IS A BUSY WOMAN AND IT IS HER TIME BEING WASTED.   THEN SHE WAS JUST GOING TO HANG UP.

## 2019-01-18 ENCOUNTER — OFFICE VISIT (OUTPATIENT)
Dept: INTERNAL MEDICINE | Age: 59
End: 2019-01-18
Payer: MEDICAID

## 2019-01-18 VITALS
WEIGHT: 206.4 LBS | DIASTOLIC BLOOD PRESSURE: 79 MMHG | HEIGHT: 74 IN | BODY MASS INDEX: 26.49 KG/M2 | HEART RATE: 58 BPM | SYSTOLIC BLOOD PRESSURE: 138 MMHG

## 2019-01-18 DIAGNOSIS — M54.50 BILATERAL LOW BACK PAIN WITHOUT SCIATICA, UNSPECIFIED CHRONICITY: Primary | ICD-10-CM

## 2019-01-18 DIAGNOSIS — R73.03 PRE-DIABETES: ICD-10-CM

## 2019-01-18 DIAGNOSIS — G89.29 CHRONIC PAIN OF LEFT KNEE: ICD-10-CM

## 2019-01-18 DIAGNOSIS — M25.562 CHRONIC PAIN OF LEFT KNEE: ICD-10-CM

## 2019-01-18 DIAGNOSIS — I10 ESSENTIAL HYPERTENSION: ICD-10-CM

## 2019-01-18 LAB — HBA1C MFR BLD: 5.8 %

## 2019-01-18 PROCEDURE — G8484 FLU IMMUNIZE NO ADMIN: HCPCS | Performed by: STUDENT IN AN ORGANIZED HEALTH CARE EDUCATION/TRAINING PROGRAM

## 2019-01-18 PROCEDURE — G8417 CALC BMI ABV UP PARAM F/U: HCPCS | Performed by: STUDENT IN AN ORGANIZED HEALTH CARE EDUCATION/TRAINING PROGRAM

## 2019-01-18 PROCEDURE — 99213 OFFICE O/P EST LOW 20 MIN: CPT | Performed by: STUDENT IN AN ORGANIZED HEALTH CARE EDUCATION/TRAINING PROGRAM

## 2019-01-18 PROCEDURE — G8427 DOCREV CUR MEDS BY ELIG CLIN: HCPCS | Performed by: STUDENT IN AN ORGANIZED HEALTH CARE EDUCATION/TRAINING PROGRAM

## 2019-01-18 PROCEDURE — 3017F COLORECTAL CA SCREEN DOC REV: CPT | Performed by: STUDENT IN AN ORGANIZED HEALTH CARE EDUCATION/TRAINING PROGRAM

## 2019-01-18 PROCEDURE — 1036F TOBACCO NON-USER: CPT | Performed by: STUDENT IN AN ORGANIZED HEALTH CARE EDUCATION/TRAINING PROGRAM

## 2019-01-18 PROCEDURE — 99211 OFF/OP EST MAY X REQ PHY/QHP: CPT | Performed by: INTERNAL MEDICINE

## 2019-01-18 PROCEDURE — 83036 HEMOGLOBIN GLYCOSYLATED A1C: CPT | Performed by: STUDENT IN AN ORGANIZED HEALTH CARE EDUCATION/TRAINING PROGRAM

## 2019-01-18 RX ORDER — CAPSAICIN 0.025 %
CREAM (GRAM) TOPICAL
Qty: 1 TUBE | Refills: 1 | Status: SHIPPED | OUTPATIENT
Start: 2019-01-18 | End: 2019-02-17

## 2019-01-18 RX ORDER — ACETAMINOPHEN 325 MG/1
650 TABLET ORAL 3 TIMES DAILY PRN
Qty: 60 TABLET | Refills: 0 | Status: ON HOLD | OUTPATIENT
Start: 2019-01-18 | End: 2019-05-27 | Stop reason: HOSPADM

## 2019-01-18 RX ORDER — CYCLOBENZAPRINE HCL 5 MG
5 TABLET ORAL 2 TIMES DAILY PRN
Qty: 30 TABLET | Refills: 0 | Status: SHIPPED | OUTPATIENT
Start: 2019-01-18 | End: 2019-01-28

## 2019-01-18 ASSESSMENT — ENCOUNTER SYMPTOMS
FACIAL SWELLING: 0
ABDOMINAL PAIN: 0
CHOKING: 0
COUGH: 0
ANAL BLEEDING: 0
SINUS PAIN: 0
APNEA: 0
RHINORRHEA: 0
SINUS PRESSURE: 0
NAUSEA: 0
WHEEZING: 0
EYE ITCHING: 0
BACK PAIN: 1
EYE PAIN: 0
RECTAL PAIN: 0
BLOOD IN STOOL: 0
CHEST TIGHTNESS: 0
ABDOMINAL DISTENTION: 0
VOMITING: 0
CONSTIPATION: 0
COLOR CHANGE: 0
SORE THROAT: 0
PHOTOPHOBIA: 0
EYE REDNESS: 0
DIARRHEA: 0
STRIDOR: 0
SHORTNESS OF BREATH: 0
EYE DISCHARGE: 0

## 2019-02-08 ENCOUNTER — HOSPITAL ENCOUNTER (OUTPATIENT)
Dept: GENERAL RADIOLOGY | Age: 59
Discharge: HOME | End: 2019-02-10
Payer: MEDICAID

## 2019-02-08 ENCOUNTER — HOSPITAL ENCOUNTER (OUTPATIENT)
Age: 59
Discharge: HOME | End: 2019-02-10
Payer: MEDICAID

## 2019-02-08 ENCOUNTER — OFFICE VISIT (OUTPATIENT)
Dept: PODIATRY | Age: 59
End: 2019-02-08
Payer: MEDICAID

## 2019-02-08 VITALS
HEIGHT: 74 IN | HEART RATE: 67 BPM | SYSTOLIC BLOOD PRESSURE: 135 MMHG | BODY MASS INDEX: 27.72 KG/M2 | RESPIRATION RATE: 18 BRPM | WEIGHT: 216 LBS | DIASTOLIC BLOOD PRESSURE: 81 MMHG

## 2019-02-08 DIAGNOSIS — L84 CALLUS OF FOOT: ICD-10-CM

## 2019-02-08 DIAGNOSIS — M20.11 HAV (HALLUX ABDUCTO VALGUS), RIGHT: ICD-10-CM

## 2019-02-08 DIAGNOSIS — E11.9 TYPE 2 DIABETES MELLITUS WITHOUT COMPLICATION, WITHOUT LONG-TERM CURRENT USE OF INSULIN (HCC): ICD-10-CM

## 2019-02-08 DIAGNOSIS — G89.29 CHRONIC PAIN OF LEFT KNEE: ICD-10-CM

## 2019-02-08 DIAGNOSIS — M20.41 HAMMER TOES OF BOTH FEET: ICD-10-CM

## 2019-02-08 DIAGNOSIS — M20.12 HAV (HALLUX ABDUCTO VALGUS), LEFT: ICD-10-CM

## 2019-02-08 DIAGNOSIS — M25.562 CHRONIC PAIN OF LEFT KNEE: ICD-10-CM

## 2019-02-08 DIAGNOSIS — M54.50 BILATERAL LOW BACK PAIN WITHOUT SCIATICA, UNSPECIFIED CHRONICITY: ICD-10-CM

## 2019-02-08 DIAGNOSIS — M20.42 HAMMER TOES OF BOTH FEET: ICD-10-CM

## 2019-02-08 DIAGNOSIS — M79.671 PAIN IN BOTH FEET: ICD-10-CM

## 2019-02-08 DIAGNOSIS — M79.672 PAIN IN BOTH FEET: ICD-10-CM

## 2019-02-08 DIAGNOSIS — B35.1 ONYCHOMYCOSIS: Primary | ICD-10-CM

## 2019-02-08 PROCEDURE — 99212 OFFICE O/P EST SF 10 MIN: CPT | Performed by: STUDENT IN AN ORGANIZED HEALTH CARE EDUCATION/TRAINING PROGRAM

## 2019-02-08 PROCEDURE — G8484 FLU IMMUNIZE NO ADMIN: HCPCS | Performed by: STUDENT IN AN ORGANIZED HEALTH CARE EDUCATION/TRAINING PROGRAM

## 2019-02-08 PROCEDURE — 72100 X-RAY EXAM L-S SPINE 2/3 VWS: CPT

## 2019-02-08 PROCEDURE — 11056 PARNG/CUTG B9 HYPRKR LES 2-4: CPT | Performed by: STUDENT IN AN ORGANIZED HEALTH CARE EDUCATION/TRAINING PROGRAM

## 2019-02-08 PROCEDURE — 3044F HG A1C LEVEL LT 7.0%: CPT | Performed by: STUDENT IN AN ORGANIZED HEALTH CARE EDUCATION/TRAINING PROGRAM

## 2019-02-08 PROCEDURE — 99213 OFFICE O/P EST LOW 20 MIN: CPT | Performed by: STUDENT IN AN ORGANIZED HEALTH CARE EDUCATION/TRAINING PROGRAM

## 2019-02-08 PROCEDURE — G8417 CALC BMI ABV UP PARAM F/U: HCPCS | Performed by: STUDENT IN AN ORGANIZED HEALTH CARE EDUCATION/TRAINING PROGRAM

## 2019-02-08 PROCEDURE — 3017F COLORECTAL CA SCREEN DOC REV: CPT | Performed by: STUDENT IN AN ORGANIZED HEALTH CARE EDUCATION/TRAINING PROGRAM

## 2019-02-08 PROCEDURE — 1036F TOBACCO NON-USER: CPT | Performed by: STUDENT IN AN ORGANIZED HEALTH CARE EDUCATION/TRAINING PROGRAM

## 2019-02-08 PROCEDURE — 2022F DILAT RTA XM EVC RTNOPTHY: CPT | Performed by: STUDENT IN AN ORGANIZED HEALTH CARE EDUCATION/TRAINING PROGRAM

## 2019-02-08 PROCEDURE — 11057 PARNG/CUTG B9 HYPRKR LES >4: CPT | Performed by: STUDENT IN AN ORGANIZED HEALTH CARE EDUCATION/TRAINING PROGRAM

## 2019-02-08 PROCEDURE — 11721 DEBRIDE NAIL 6 OR MORE: CPT | Performed by: STUDENT IN AN ORGANIZED HEALTH CARE EDUCATION/TRAINING PROGRAM

## 2019-02-08 PROCEDURE — G8427 DOCREV CUR MEDS BY ELIG CLIN: HCPCS | Performed by: STUDENT IN AN ORGANIZED HEALTH CARE EDUCATION/TRAINING PROGRAM

## 2019-02-08 PROCEDURE — 73564 X-RAY EXAM KNEE 4 OR MORE: CPT

## 2019-04-09 ENCOUNTER — HOSPITAL ENCOUNTER (OUTPATIENT)
Age: 59
Discharge: HOME | End: 2019-04-09
Payer: MEDICAID

## 2019-04-09 DIAGNOSIS — R73.03 PRE-DIABETES: ICD-10-CM

## 2019-04-09 LAB
ALBUMIN SERPL-MCNC: 4.5 G/DL (ref 3.5–5.2)
ALBUMIN/GLOBULIN RATIO: 1.3 (ref 1–2.5)
ALP BLD-CCNC: 78 U/L (ref 40–129)
ALT SERPL-CCNC: 28 U/L (ref 5–41)
ANION GAP SERPL CALCULATED.3IONS-SCNC: 6 MMOL/L (ref 9–17)
AST SERPL-CCNC: 27 U/L
BILIRUB SERPL-MCNC: 0.56 MG/DL (ref 0.3–1.2)
BUN BLDV-MCNC: 10 MG/DL (ref 8–23)
BUN/CREAT BLD: ABNORMAL (ref 9–20)
CALCIUM SERPL-MCNC: 9.4 MG/DL (ref 8.6–10.4)
CHLORIDE BLD-SCNC: 106 MMOL/L (ref 98–107)
CHOLESTEROL/HDL RATIO: 2.6
CHOLESTEROL: 122 MG/DL
CO2: 25 MMOL/L (ref 20–31)
CREAT SERPL-MCNC: 1.01 MG/DL (ref 0.7–1.2)
GFR AFRICAN AMERICAN: >60 ML/MIN
GFR NON-AFRICAN AMERICAN: >60 ML/MIN
GFR SERPL CREATININE-BSD FRML MDRD: ABNORMAL ML/MIN/{1.73_M2}
GFR SERPL CREATININE-BSD FRML MDRD: ABNORMAL ML/MIN/{1.73_M2}
GLUCOSE BLD-MCNC: 90 MG/DL (ref 70–99)
HDLC SERPL-MCNC: 47 MG/DL
LDL CHOLESTEROL: 49 MG/DL (ref 0–130)
POTASSIUM SERPL-SCNC: 4 MMOL/L (ref 3.7–5.3)
SODIUM BLD-SCNC: 137 MMOL/L (ref 135–144)
TOTAL PROTEIN: 8.1 G/DL (ref 6.4–8.3)
TRIGL SERPL-MCNC: 132 MG/DL
VLDLC SERPL CALC-MCNC: NORMAL MG/DL (ref 1–30)

## 2019-04-09 PROCEDURE — 80053 COMPREHEN METABOLIC PANEL: CPT

## 2019-04-09 PROCEDURE — 36415 COLL VENOUS BLD VENIPUNCTURE: CPT

## 2019-04-09 PROCEDURE — 80061 LIPID PANEL: CPT

## 2019-04-18 DIAGNOSIS — I10 ESSENTIAL HYPERTENSION: ICD-10-CM

## 2019-04-18 DIAGNOSIS — K21.9 GASTROESOPHAGEAL REFLUX DISEASE, ESOPHAGITIS PRESENCE NOT SPECIFIED: ICD-10-CM

## 2019-04-18 NOTE — TELEPHONE ENCOUNTER
E-scribe request for Amlodipine 10, Omeprazole 10 & metoprolol 50 . Please review and e-scribe if applicable. Next Visit Date:  5/24/2019    Health Maintenance   Topic Date Due    Diabetic foot exam  08/30/1968    Diabetic retinal exam  08/30/1968    Diabetic microalbuminuria test  11/20/2018    Shingles Vaccine (1 of 2) 01/01/2020 (Originally 8/30/2008)    Colon Cancer Screen FIT/FOBT  06/08/2019    Flu vaccine (Season Ended) 09/01/2019    A1C test (Diabetic or Prediabetic)  01/18/2020    Lipid screen  04/09/2020    Potassium monitoring  04/09/2020    Creatinine monitoring  04/09/2020    DTaP/Tdap/Td vaccine (2 - Td) 09/13/2026    Pneumococcal 0-64 years Vaccine  Completed    Hepatitis C screen  Completed    HIV screen  Completed             (applicable per patient's age: Cancer Screenings, Depression Screening, Fall Risk Screening, Immunizations)    Hemoglobin A1C (%)   Date Value   01/18/2019 5.8   11/13/2017 6.1   06/28/2016 6.0     Microalb/Crt.  Ratio (mcg/mg creat)   Date Value   11/20/2017 CANNOT BE CALCULATED     LDL Cholesterol (mg/dL)   Date Value   04/09/2019 49     AST (U/L)   Date Value   04/09/2019 27     ALT (U/L)   Date Value   04/09/2019 28     BUN (mg/dL)   Date Value   04/09/2019 10      (goal A1C is < 7)   (goal LDL is <100) need 30-50% reduction from baseline     BP Readings from Last 3 Encounters:   02/08/19 135/81   01/18/19 138/79   04/30/18 136/78    (goal /80)      All Future Testing planned in [facility]:  Lab Frequency Next Occurrence       Next Visit Date:  Future Appointments   Date Time Provider Sheldon Eubanks   5/13/2019  1:45 PM Bruce Lezama DPM [facility]   5/24/2019  8:30 AM Po Ralph MD [id number]            Patient Active Problem List:     Callus of foot     S/P hammer toe correction     Pre-diabetes     Gastroesophageal reflux disease     Essential hypertension

## 2019-04-19 RX ORDER — OMEPRAZOLE 10 MG/1
10 CAPSULE, DELAYED RELEASE ORAL DAILY
Qty: 90 CAPSULE | Refills: 1 | Status: SHIPPED | OUTPATIENT
Start: 2019-04-19 | End: 2019-10-09 | Stop reason: SDUPTHER

## 2019-04-19 RX ORDER — AMLODIPINE BESYLATE 10 MG/1
10 TABLET ORAL DAILY
Qty: 90 TABLET | Refills: 1 | Status: SHIPPED | OUTPATIENT
Start: 2019-04-19 | End: 2019-06-07 | Stop reason: SDUPTHER

## 2019-04-19 RX ORDER — METOPROLOL TARTRATE 50 MG/1
50 TABLET, FILM COATED ORAL 2 TIMES DAILY
Qty: 180 TABLET | Refills: 1 | Status: SHIPPED | OUTPATIENT
Start: 2019-04-19 | End: 2019-06-07 | Stop reason: SDUPTHER

## 2019-05-13 ENCOUNTER — TELEPHONE (OUTPATIENT)
Dept: PODIATRY | Age: 59
End: 2019-05-13

## 2019-05-13 NOTE — TELEPHONE ENCOUNTER
Writer called pt to reschedule missed appointment and to offer up a Friday appointment, seeing as he was originally a pt of Dr. Susie Cantu [facility] Podiat[facility] clinic. Pt unavailable, writer left message for pt to call back with his wife.

## 2019-05-24 DIAGNOSIS — R73.03 PRE-DIABETES: ICD-10-CM

## 2019-05-24 NOTE — TELEPHONE ENCOUNTER
Refill request for med pended          Next Visit Date:  Future Appointments   Date Time Provider Sheldon Jewelli   6/7/2019 11:25 AM Ashok Manriquez MD [address] [facility]   Topic Date Due    Diabetic foot exam  08/30/1968    Diabetic retinal exam  08/30/1968    Diabetic microalbuminuria test  11/20/2018    Colon Cancer Screen FIT/FOBT  06/08/2019    Shingles Vaccine (1 of 2) 01/01/2020 (Originally 8/30/2008)    Flu vaccine (Season Ended) 09/01/2019    A1C test (Diabetic or Prediabetic)  01/18/2020    Lipid screen  04/09/2020    Potassium monitoring  04/09/2020    Creatinine monitoring  04/09/2020    DTaP/Tdap/Td vaccine (2 - Td) 09/13/2026    Pneumococcal 0-64 years Vaccine  Completed    Hepatitis C screen  Completed    HIV screen  Completed       Hemoglobin A1C (%)   Date Value   01/18/2019 5.8   11/13/2017 6.1   06/28/2016 6.0             ( goal A1C is < 7)   Microalb/Crt.  Ratio (mcg/mg creat)   Date Value   11/20/2017 CANNOT BE CALCULATED     LDL Cholesterol (mg/dL)   Date Value   04/09/2019 49   11/20/2017 78       (goal LDL is <100)   AST (U/L)   Date Value   04/09/2019 27     ALT (U/L)   Date Value   04/09/2019 28     BUN (mg/dL)   Date Value   04/09/2019 10     BP Readings from Last 3 Encounters:   02/08/19 135/81   01/18/19 138/79   04/30/18 136/78          (goal 120/80)    All Future Testing planned in [facility]  Lab Frequency Next Occurrence               Patient Active Problem List:     Callus of foot     S/P hammer toe correction     Pre-diabetes     Gastroesophageal reflux disease     Essential hypertension

## 2019-05-25 ENCOUNTER — HOSPITAL ENCOUNTER (INPATIENT)
Age: 59
LOS: 2 days | Discharge: HOME | DRG: 816 | End: 2019-05-27
Attending: EMERGENCY MEDICINE | Admitting: INTERNAL MEDICINE
Payer: MEDICAID

## 2019-05-25 ENCOUNTER — APPOINTMENT (OUTPATIENT)
Dept: GENERAL RADIOLOGY | Age: 59
DRG: 816 | End: 2019-05-25
Payer: MEDICAID

## 2019-05-25 DIAGNOSIS — I48.91 NEW ONSET ATRIAL FIBRILLATION (HCC): Primary | ICD-10-CM

## 2019-05-25 DIAGNOSIS — F19.90 DRUG USE: ICD-10-CM

## 2019-05-25 PROBLEM — F10.20 ALCOHOLIC (HCC): Status: ACTIVE | Noted: 2019-05-25

## 2019-05-25 PROBLEM — F17.200 SMOKER: Status: ACTIVE | Noted: 2019-05-25

## 2019-05-25 LAB
ABSOLUTE EOS #: <0.03 K/UL (ref 0–0.44)
ABSOLUTE IMMATURE GRANULOCYTE: <0.03 K/UL (ref 0–0.3)
ABSOLUTE LYMPH #: 1.65 K/UL (ref 1.1–3.7)
ABSOLUTE MONO #: 0.71 K/UL (ref 0.1–1.2)
ACETAMINOPHEN LEVEL: <5 UG/ML (ref 10–30)
ANION GAP SERPL CALCULATED.3IONS-SCNC: 17 MMOL/L (ref 9–17)
BASOPHILS # BLD: 0 % (ref 0–2)
BASOPHILS ABSOLUTE: <0.03 K/UL (ref 0–0.2)
BNP INTERPRETATION: NORMAL
BUN BLDV-MCNC: 14 MG/DL (ref 8–23)
BUN/CREAT BLD: ABNORMAL (ref 9–20)
CALCIUM SERPL-MCNC: 8.6 MG/DL (ref 8.6–10.4)
CHLORIDE BLD-SCNC: 98 MMOL/L (ref 98–107)
CO2: 20 MMOL/L (ref 20–31)
CREAT SERPL-MCNC: 1.07 MG/DL (ref 0.7–1.2)
DIFFERENTIAL TYPE: ABNORMAL
EOSINOPHILS RELATIVE PERCENT: 0 % (ref 1–4)
ETHANOL PERCENT: <0.01 %
ETHANOL: <10 MG/DL
GFR AFRICAN AMERICAN: >60 ML/MIN
GFR NON-AFRICAN AMERICAN: >60 ML/MIN
GFR SERPL CREATININE-BSD FRML MDRD: ABNORMAL ML/MIN/{1.73_M2}
GFR SERPL CREATININE-BSD FRML MDRD: ABNORMAL ML/MIN/{1.73_M2}
GLUCOSE BLD-MCNC: 189 MG/DL (ref 70–99)
HCT VFR BLD CALC: 37.5 % (ref 40.7–50.3)
HCT VFR BLD CALC: 40.1 % (ref 40.7–50.3)
HEMOGLOBIN: 12.2 G/DL (ref 13–17)
HEMOGLOBIN: 12.8 G/DL (ref 13–17)
IMMATURE GRANULOCYTES: 0 %
LYMPHOCYTES # BLD: 26 % (ref 24–43)
MCH RBC QN AUTO: 28.6 PG (ref 25.2–33.5)
MCH RBC QN AUTO: 29.2 PG (ref 25.2–33.5)
MCHC RBC AUTO-ENTMCNC: 31.9 G/DL (ref 28.4–34.8)
MCHC RBC AUTO-ENTMCNC: 32.5 G/DL (ref 28.4–34.8)
MCV RBC AUTO: 89.7 FL (ref 82.6–102.9)
MCV RBC AUTO: 89.7 FL (ref 82.6–102.9)
MONOCYTES # BLD: 11 % (ref 3–12)
NRBC AUTOMATED: 0 PER 100 WBC
NRBC AUTOMATED: 0 PER 100 WBC
PARTIAL THROMBOPLASTIN TIME: 24.3 SEC (ref 20.5–30.5)
PDW BLD-RTO: 14.6 % (ref 11.8–14.4)
PDW BLD-RTO: 14.6 % (ref 11.8–14.4)
PLATELET # BLD: 206 K/UL (ref 138–453)
PLATELET # BLD: 218 K/UL (ref 138–453)
PLATELET ESTIMATE: ABNORMAL
PMV BLD AUTO: 10 FL (ref 8.1–13.5)
PMV BLD AUTO: 9.1 FL (ref 8.1–13.5)
POTASSIUM SERPL-SCNC: 4.1 MMOL/L (ref 3.7–5.3)
PRO-BNP: 72 PG/ML
RBC # BLD: 4.18 M/UL (ref 4.21–5.77)
RBC # BLD: 4.47 M/UL (ref 4.21–5.77)
RBC # BLD: ABNORMAL 10*6/UL
SALICYLATE LEVEL: <1 MG/DL (ref 3–10)
SEG NEUTROPHILS: 63 % (ref 36–65)
SEGMENTED NEUTROPHILS ABSOLUTE COUNT: 3.85 K/UL (ref 1.5–8.1)
SODIUM BLD-SCNC: 135 MMOL/L (ref 135–144)
TOXIC TRICYCLIC SC,BLOOD: NEGATIVE
TROPONIN INTERP: NORMAL
TROPONIN INTERP: NORMAL
TROPONIN T: NORMAL NG/ML
TROPONIN T: NORMAL NG/ML
TROPONIN, HIGH SENSITIVITY: 11 NG/L (ref 0–22)
TROPONIN, HIGH SENSITIVITY: 16 NG/L (ref 0–22)
TSH SERPL DL<=0.05 MIU/L-ACNC: 1.66 MIU/L (ref 0.3–5)
WBC # BLD: 6.1 K/UL (ref 3.5–11.3)
WBC # BLD: 6.3 K/UL (ref 3.5–11.3)
WBC # BLD: ABNORMAL 10*3/UL

## 2019-05-25 PROCEDURE — 1200000000 HC SEMI PRIVATE

## 2019-05-25 PROCEDURE — G0378 HOSPITAL OBSERVATION PER HR: HCPCS

## 2019-05-25 PROCEDURE — 80048 BASIC METABOLIC PNL TOTAL CA: CPT

## 2019-05-25 PROCEDURE — 80307 DRUG TEST PRSMV CHEM ANLYZR: CPT

## 2019-05-25 PROCEDURE — 6360000002 HC RX W HCPCS: Performed by: STUDENT IN AN ORGANIZED HEALTH CARE EDUCATION/TRAINING PROGRAM

## 2019-05-25 PROCEDURE — 93005 ELECTROCARDIOGRAM TRACING: CPT

## 2019-05-25 PROCEDURE — G0480 DRUG TEST DEF 1-7 CLASSES: HCPCS

## 2019-05-25 PROCEDURE — 85730 THROMBOPLASTIN TIME PARTIAL: CPT

## 2019-05-25 PROCEDURE — 85027 COMPLETE CBC AUTOMATED: CPT

## 2019-05-25 PROCEDURE — 83880 ASSAY OF NATRIURETIC PEPTIDE: CPT

## 2019-05-25 PROCEDURE — 96374 THER/PROPH/DIAG INJ IV PUSH: CPT

## 2019-05-25 PROCEDURE — 84443 ASSAY THYROID STIM HORMONE: CPT

## 2019-05-25 PROCEDURE — 99285 EMERGENCY DEPT VISIT HI MDM: CPT

## 2019-05-25 PROCEDURE — 96375 TX/PRO/DX INJ NEW DRUG ADDON: CPT

## 2019-05-25 PROCEDURE — 2580000003 HC RX 258: Performed by: STUDENT IN AN ORGANIZED HEALTH CARE EDUCATION/TRAINING PROGRAM

## 2019-05-25 PROCEDURE — 6360000002 HC RX W HCPCS: Performed by: EMERGENCY MEDICINE

## 2019-05-25 PROCEDURE — 6370000000 HC RX 637 (ALT 250 FOR IP): Performed by: STUDENT IN AN ORGANIZED HEALTH CARE EDUCATION/TRAINING PROGRAM

## 2019-05-25 PROCEDURE — 2580000003 HC RX 258: Performed by: EMERGENCY MEDICINE

## 2019-05-25 PROCEDURE — 71046 X-RAY EXAM CHEST 2 VIEWS: CPT

## 2019-05-25 PROCEDURE — 36415 COLL VENOUS BLD VENIPUNCTURE: CPT

## 2019-05-25 PROCEDURE — 93005 ELECTROCARDIOGRAM TRACING: CPT | Performed by: EMERGENCY MEDICINE

## 2019-05-25 PROCEDURE — 85025 COMPLETE CBC W/AUTO DIFF WBC: CPT

## 2019-05-25 PROCEDURE — 84484 ASSAY OF TROPONIN QUANT: CPT

## 2019-05-25 RX ORDER — SODIUM CHLORIDE 0.9 % (FLUSH) 0.9 %
10 SYRINGE (ML) INJECTION EVERY 12 HOURS SCHEDULED
Status: CANCELLED | OUTPATIENT
Start: 2019-05-25

## 2019-05-25 RX ORDER — ONDANSETRON 2 MG/ML
4 INJECTION INTRAMUSCULAR; INTRAVENOUS EVERY 6 HOURS PRN
Status: DISCONTINUED | OUTPATIENT
Start: 2019-05-25 | End: 2019-05-27 | Stop reason: HOSPADM

## 2019-05-25 RX ORDER — ONDANSETRON 2 MG/ML
4 INJECTION INTRAMUSCULAR; INTRAVENOUS EVERY 8 HOURS PRN
Status: CANCELLED | OUTPATIENT
Start: 2019-05-25

## 2019-05-25 RX ORDER — ONDANSETRON 2 MG/ML
4 INJECTION INTRAMUSCULAR; INTRAVENOUS ONCE
Status: COMPLETED | OUTPATIENT
Start: 2019-05-25 | End: 2019-05-25

## 2019-05-25 RX ORDER — OMEPRAZOLE 10 MG/1
10 CAPSULE, DELAYED RELEASE ORAL DAILY
Status: DISCONTINUED | OUTPATIENT
Start: 2019-05-26 | End: 2019-05-27 | Stop reason: HOSPADM

## 2019-05-25 RX ORDER — SODIUM CHLORIDE 0.9 % (FLUSH) 0.9 %
10 SYRINGE (ML) INJECTION EVERY 12 HOURS SCHEDULED
Status: DISCONTINUED | OUTPATIENT
Start: 2019-05-25 | End: 2019-05-27 | Stop reason: HOSPADM

## 2019-05-25 RX ORDER — HEPARIN SODIUM 1000 [USP'U]/ML
4000 INJECTION, SOLUTION INTRAVENOUS; SUBCUTANEOUS PRN
Status: DISCONTINUED | OUTPATIENT
Start: 2019-05-25 | End: 2019-05-27

## 2019-05-25 RX ORDER — HEPARIN SODIUM 1000 [USP'U]/ML
2000 INJECTION, SOLUTION INTRAVENOUS; SUBCUTANEOUS PRN
Status: DISCONTINUED | OUTPATIENT
Start: 2019-05-25 | End: 2019-05-27

## 2019-05-25 RX ORDER — POTASSIUM CHLORIDE 7.45 MG/ML
10 INJECTION INTRAVENOUS PRN
Status: DISCONTINUED | OUTPATIENT
Start: 2019-05-25 | End: 2019-05-27 | Stop reason: HOSPADM

## 2019-05-25 RX ORDER — HEPARIN SODIUM 10000 [USP'U]/100ML
11 INJECTION, SOLUTION INTRAVENOUS CONTINUOUS
Status: DISCONTINUED | OUTPATIENT
Start: 2019-05-25 | End: 2019-05-27

## 2019-05-25 RX ORDER — ACETAMINOPHEN 325 MG/1
650 TABLET ORAL EVERY 4 HOURS PRN
Status: CANCELLED | OUTPATIENT
Start: 2019-05-25

## 2019-05-25 RX ORDER — METOPROLOL TARTRATE 50 MG/1
50 TABLET, FILM COATED ORAL 2 TIMES DAILY
Status: DISCONTINUED | OUTPATIENT
Start: 2019-05-25 | End: 2019-05-26

## 2019-05-25 RX ORDER — 0.9 % SODIUM CHLORIDE 0.9 %
1000 INTRAVENOUS SOLUTION INTRAVENOUS ONCE
Status: COMPLETED | OUTPATIENT
Start: 2019-05-25 | End: 2019-05-25

## 2019-05-25 RX ORDER — HEPARIN SODIUM 1000 [USP'U]/ML
4000 INJECTION, SOLUTION INTRAVENOUS; SUBCUTANEOUS ONCE
Status: COMPLETED | OUTPATIENT
Start: 2019-05-25 | End: 2019-05-25

## 2019-05-25 RX ORDER — POTASSIUM CHLORIDE 20 MEQ/1
40 TABLET, EXTENDED RELEASE ORAL PRN
Status: DISCONTINUED | OUTPATIENT
Start: 2019-05-25 | End: 2019-05-27 | Stop reason: HOSPADM

## 2019-05-25 RX ORDER — SODIUM CHLORIDE 0.9 % (FLUSH) 0.9 %
10 SYRINGE (ML) INJECTION PRN
Status: CANCELLED | OUTPATIENT
Start: 2019-05-25

## 2019-05-25 RX ORDER — SODIUM CHLORIDE 0.9 % (FLUSH) 0.9 %
10 SYRINGE (ML) INJECTION PRN
Status: DISCONTINUED | OUTPATIENT
Start: 2019-05-25 | End: 2019-05-27 | Stop reason: HOSPADM

## 2019-05-25 RX ADMIN — METOPROLOL TARTRATE 50 MG: 50 TABLET, FILM COATED ORAL at 23:34

## 2019-05-25 RX ADMIN — ONDANSETRON 4 MG: 2 INJECTION INTRAMUSCULAR; INTRAVENOUS at 20:16

## 2019-05-25 RX ADMIN — Medication 10 ML: at 23:32

## 2019-05-25 RX ADMIN — SODIUM CHLORIDE 1000 ML: 9 INJECTION, SOLUTION INTRAVENOUS at 20:16

## 2019-05-25 RX ADMIN — HEPARIN SODIUM AND DEXTROSE 11.25 UNITS/KG/HR: 10000; 5 INJECTION INTRAVENOUS at 23:44

## 2019-05-25 RX ADMIN — HEPARIN SODIUM 4000 UNITS: 1000 INJECTION, SOLUTION INTRAVENOUS; SUBCUTANEOUS at 23:36

## 2019-05-25 ASSESSMENT — ENCOUNTER SYMPTOMS
VOMITING: 0
SHORTNESS OF BREATH: 0
BACK PAIN: 0
ABDOMINAL PAIN: 1
NAUSEA: 1
VOICE CHANGE: 0

## 2019-05-26 LAB
AMPHETAMINE SCREEN URINE: NEGATIVE
ANION GAP SERPL CALCULATED.3IONS-SCNC: 11 MMOL/L (ref 9–17)
BARBITURATE SCREEN URINE: NEGATIVE
BENZODIAZEPINE SCREEN, URINE: NEGATIVE
BUN BLDV-MCNC: 11 MG/DL (ref 8–23)
BUN/CREAT BLD: ABNORMAL (ref 9–20)
BUPRENORPHINE URINE: ABNORMAL
CALCIUM SERPL-MCNC: 8.5 MG/DL (ref 8.6–10.4)
CANNABINOID SCREEN URINE: NEGATIVE
CHLORIDE BLD-SCNC: 103 MMOL/L (ref 98–107)
CO2: 25 MMOL/L (ref 20–31)
COCAINE METABOLITE, URINE: POSITIVE
CREAT SERPL-MCNC: 0.92 MG/DL (ref 0.7–1.2)
GFR AFRICAN AMERICAN: >60 ML/MIN
GFR NON-AFRICAN AMERICAN: >60 ML/MIN
GFR SERPL CREATININE-BSD FRML MDRD: ABNORMAL ML/MIN/{1.73_M2}
GFR SERPL CREATININE-BSD FRML MDRD: ABNORMAL ML/MIN/{1.73_M2}
GLUCOSE BLD-MCNC: 101 MG/DL (ref 70–99)
GLUCOSE BLD-MCNC: 104 MG/DL (ref 75–110)
GLUCOSE BLD-MCNC: 117 MG/DL (ref 75–110)
GLUCOSE BLD-MCNC: 91 MG/DL (ref 75–110)
MDMA URINE: ABNORMAL
METHADONE SCREEN, URINE: NEGATIVE
METHAMPHETAMINE, URINE: ABNORMAL
OPIATES, URINE: NEGATIVE
OXYCODONE SCREEN URINE: NEGATIVE
PARTIAL THROMBOPLASTIN TIME: 39.9 SEC (ref 20.5–30.5)
PARTIAL THROMBOPLASTIN TIME: 49.3 SEC (ref 20.5–30.5)
PARTIAL THROMBOPLASTIN TIME: 55.9 SEC (ref 20.5–30.5)
PARTIAL THROMBOPLASTIN TIME: 62.3 SEC (ref 20.5–30.5)
PHENCYCLIDINE, URINE: NEGATIVE
POTASSIUM SERPL-SCNC: 3.9 MMOL/L (ref 3.7–5.3)
PROPOXYPHENE, URINE: ABNORMAL
SODIUM BLD-SCNC: 139 MMOL/L (ref 135–144)
TEST INFORMATION: ABNORMAL
TRICYCLIC ANTIDEPRESSANTS, UR: ABNORMAL

## 2019-05-26 PROCEDURE — 82947 ASSAY GLUCOSE BLOOD QUANT: CPT

## 2019-05-26 PROCEDURE — 96365 THER/PROPH/DIAG IV INF INIT: CPT

## 2019-05-26 PROCEDURE — 80048 BASIC METABOLIC PNL TOTAL CA: CPT

## 2019-05-26 PROCEDURE — 80307 DRUG TEST PRSMV CHEM ANLYZR: CPT

## 2019-05-26 PROCEDURE — 96376 TX/PRO/DX INJ SAME DRUG ADON: CPT

## 2019-05-26 PROCEDURE — 6370000000 HC RX 637 (ALT 250 FOR IP): Performed by: STUDENT IN AN ORGANIZED HEALTH CARE EDUCATION/TRAINING PROGRAM

## 2019-05-26 PROCEDURE — 6360000002 HC RX W HCPCS: Performed by: STUDENT IN AN ORGANIZED HEALTH CARE EDUCATION/TRAINING PROGRAM

## 2019-05-26 PROCEDURE — 97161 PT EVAL LOW COMPLEX 20 MIN: CPT

## 2019-05-26 PROCEDURE — 36415 COLL VENOUS BLD VENIPUNCTURE: CPT

## 2019-05-26 PROCEDURE — 96366 THER/PROPH/DIAG IV INF ADDON: CPT

## 2019-05-26 PROCEDURE — 96368 THER/DIAG CONCURRENT INF: CPT

## 2019-05-26 PROCEDURE — 99223 1ST HOSP IP/OBS HIGH 75: CPT | Performed by: INTERNAL MEDICINE

## 2019-05-26 PROCEDURE — G0378 HOSPITAL OBSERVATION PER HR: HCPCS

## 2019-05-26 PROCEDURE — 93005 ELECTROCARDIOGRAM TRACING: CPT

## 2019-05-26 PROCEDURE — 1200000000 HC SEMI PRIVATE

## 2019-05-26 PROCEDURE — 85730 THROMBOPLASTIN TIME PARTIAL: CPT

## 2019-05-26 PROCEDURE — 2580000003 HC RX 258: Performed by: STUDENT IN AN ORGANIZED HEALTH CARE EDUCATION/TRAINING PROGRAM

## 2019-05-26 RX ORDER — CARVEDILOL 6.25 MG/1
6.25 TABLET ORAL 2 TIMES DAILY WITH MEALS
Status: DISCONTINUED | OUTPATIENT
Start: 2019-05-26 | End: 2019-05-27

## 2019-05-26 RX ORDER — MAGNESIUM SULFATE 1 G/100ML
1 INJECTION INTRAVENOUS ONCE
Status: COMPLETED | OUTPATIENT
Start: 2019-05-26 | End: 2019-05-26

## 2019-05-26 RX ADMIN — HEPARIN SODIUM 2000 UNITS: 1000 INJECTION INTRAVENOUS; SUBCUTANEOUS at 12:27

## 2019-05-26 RX ADMIN — CARVEDILOL 6.25 MG: 6.25 TABLET, FILM COATED ORAL at 20:54

## 2019-05-26 RX ADMIN — Medication 10 ML: at 09:38

## 2019-05-26 RX ADMIN — METOPROLOL TARTRATE 50 MG: 50 TABLET, FILM COATED ORAL at 09:34

## 2019-05-26 RX ADMIN — HEPARIN SODIUM 2000 UNITS: 1000 INJECTION INTRAVENOUS; SUBCUTANEOUS at 05:18

## 2019-05-26 RX ADMIN — MAGNESIUM SULFATE HEPTAHYDRATE 1 G: 1 INJECTION, SOLUTION INTRAVENOUS at 09:34

## 2019-05-26 RX ADMIN — HEPARIN SODIUM AND DEXTROSE 13 UNITS/KG/HR: 10000; 5 INJECTION INTRAVENOUS at 05:20

## 2019-05-26 RX ADMIN — HEPARIN SODIUM AND DEXTROSE 15 UNITS/KG/HR: 10000; 5 INJECTION INTRAVENOUS at 19:27

## 2019-05-26 RX ADMIN — OMEPRAZOLE 10 MG: 10 CAPSULE, DELAYED RELEASE ORAL at 09:34

## 2019-05-26 RX ADMIN — Medication 10 ML: at 20:55

## 2019-05-26 ASSESSMENT — ENCOUNTER SYMPTOMS
COUGH: 0
CONSTIPATION: 0
CHEST TIGHTNESS: 0
ABDOMINAL DISTENTION: 0
NAUSEA: 0
SHORTNESS OF BREATH: 0
WHEEZING: 0
VOMITING: 0
BACK PAIN: 0
DIARRHEA: 0
COLOR CHANGE: 0

## 2019-05-26 ASSESSMENT — PAIN SCALES - GENERAL
PAINLEVEL_OUTOF10: 0
PAINLEVEL_OUTOF10: 0

## 2019-05-26 NOTE — H&P
Internal Medicine - History and Physical Examination    Patient's name:  Janna Serrano  Medical Record Number: [id number]  Patient's account/billing number: [de-identified]  Patient's YOB: 1960  Age: 59 y.o. Date of Admission: 5/25/2019  7:59 PM  Primary Care Physician: Anne Chavez MD    Code Status: No Order    Chief complaint: Drug Intoxication    HISTORY OF PRESENT ILLNESS:   History was obtained from chart review and the patient. Janna Serrano is a 59 y.o. male who was found unconscious on the street. Emergency medical services arrived and they gave him Narcan to which the patient responded. The patient was brought to the emergency department at [facility].  He reportedly said that he overdosed on cocaine from the street. The patient denied any complaints of lightheadedness, dizziness, chest pain, shortness of breath, palpitations, nausea, vomiting, pain in the abdomen. In the ED, the patient was afebrile. Vitals are stable. Tachycardic. EKG in the emergency Department showed atrial fibrillation which is new. Labs were normal.  Troponin was normal as well. Glucose 189. Chest x-ray negative. Blood toxicology screen negative. Urine tox pending. Patient admitted for new onset atrial fibrillation. The patient denies any history of arrhythmia, status post and the heart or open-heart surgeries. He denied any history of stroke, COPD or CKD. The patient's past medical history significant for prediabetes and hypertension. His last HbA1c was 5.8 in January 2019. He agrees to missing his medicines at times. He smokes 4-5 cigarettes daily. He drinks almost 6 glasses of wine daily. His other past medical history includes rheumatoid arthritis.     Past Medical History:        Diagnosis Date    Diabetes mellitus ([address]     GERD (gastroesophageal reflux disease)     Hypertension     Rheumatoid arthritis(714.0)     newly diagnosed    Wears glasses        Past

## 2019-05-26 NOTE — CARE COORDINATION
Case Management Initial Discharge Plan  María Elena Caldera,             Met with:patient to discuss discharge plans. Information verified: address, contacts, phone number, , insurance Yes  PCP: Ijeoma Daniel MD  Date of last visit: February    Insurance Provider: [facility]    Discharge Planning    Living Arrangements:  Spouse/Significant Other   Support Systems:  Spouse/Significant Other, Children    Home has 1 stories  A couple  stairs to climb to get into front door,   Location of bedroom/bathroom in home main    Patient able to perform ADL's:Independent    Current Services (outpatient & in home) none  DME equipment: none  DME provider: none    Pharmacy: [facility] Medications:  No  Does patient want to participate in local refill/ meds to beds program?  No    Potential Assistance Needed:  N/A    Patient agreeable to home care: No  State Line of choice provided:  no    Prior SNF/Rehab Placement and Facility: no  Agreeable to SNF/Rehab: No  State [place] of choice provided: no   Evaluation: no    Expected Discharge date:  19  Patient expects to be discharged to:  home  Follow Up Appointment: Best Day/ Time: Monday PM    Transportation provider: osmani  Transportation arrangements needed for discharge: No    Readmission Risk              Risk of Unplanned Readmission:        15             Does patient have a readmission risk score greater than 14?: Yes  If yes, follow-up appointment must be made within 7 days of discharge.      Discharge Plan: home with girlfriend, denies needs          Electronically signed by Priscilla Cook RN on 19 at 11:06 AM

## 2019-05-26 NOTE — PROGRESS NOTES
noted  Lungs:  clear to auscultation, no wheezes, rales or rhonchi, symmetric air entry  Heart[de-identified] normal rate, regular rhythm, normal S1, S2, no murmurs, rubs, clicks or gallops  Abdomen:  soft, nontender, nondistended, no masses or organomegaly  Extremities: peripheral pulses normal, no pedal edema, no clubbing or cyanosis   Skin: normal coloration and turgor, no rashes, no suspicious skin lesions noted    Medications:  Scheduled Medications   omeprazole  10 mg Oral Daily    metoprolol tartrate  50 mg Oral BID    insulin lispro  0-6 Units Subcutaneous TID WC    insulin lispro  0-3 Units Subcutaneous Nightly    sodium chloride flush  10 mL Intravenous 2 times per day       PRN Medications  sodium chloride flush 10 mL PRN   magnesium hydroxide 30 mL Daily PRN   ondansetron 4 mg Q6H PRN   potassium chloride 40 mEq PRN   Or     potassium alternative oral replacement 40 mEq PRN   Or     potassium chloride 10 mEq PRN   heparin (porcine) 4,000 Units PRN   heparin (porcine) 2,000 Units PRN       Diagnostic Labs and Imaging:  CBC:  Recent Labs     05/25/19 2018 05/25/19  2338   WBC 6.3 6.1   HGB 12.8* 12.2*    218     BMP: Recent Labs     05/25/19  2018 05/26/19  0316    139   K 4.1 3.9   CL 98 103   CO2 20 25   BUN 14 11   CREATININE 1.07 0.92   GLUCOSE 189* 101*     Assessment and Plan:     Principal Problem:    New onset atrial fibrillation (HCC)  Active Problems:    Pre-diabetes    Gastroesophageal reflux disease    Essential hypertension    Smoker    Alcoholic (HCC)    A-fib (HCC)  Resolved Problems:    * No resolved hospital problems. *    1. New Onset Atrial Fibrillation  Lopressor 50 mg daily. Continue low-dose heparin drip. Patient will get an echo because of new onset atrial fibrillation. EKG one time on the floor. Cardiology consulted.     2. Essential Hypertension  Lopressor 50 mg daily.     3. Prediabetes  Low-dose sliding scale. Hypoglycemia protocol.   POCT glucose before meals and at

## 2019-05-26 NOTE — ED PROVIDER NOTES
Internal Medicine    Initiate Oxygen Therapy Protocol    EKG 12 Lead    Saline lock IV    PATIENT STATUS (FROM ED OR OR/PROCEDURAL) Inpatient       MEDICATIONS ORDERED:  Orders Placed This Encounter   Medications    0.9 % sodium chloride bolus    ondansetron (ZOFRAN) injection 4 mg       DIAGNOSTIC RESULTS / EMERGENCY DEPARTMENT COURSE /MDM / DIFFERENTIAL DIAGNOSIS     LABS:  Results for orders placed or performed during the hospital encounter of 05/25/19   CBC Auto Differential   Result Value Ref Range    WBC 6.3 3.5 - 11.3 k/uL    RBC 4.47 4.21 - 5.77 m/uL    Hemoglobin 12.8 (L) 13.0 - 17.0 g/dL    Hematocrit 40.1 (L) 40.7 - 50.3 %    MCV 89.7 82.6 - 102.9 fL    MCH 28.6 25.2 - 33.5 pg    MCHC 31.9 28.4 - 34.8 g/dL    RDW 14.6 (H) 11.8 - 14.4 %    Platelets 609 691 - 137 k/uL    MPV 10.0 8.1 - 13.5 fL    NRBC Automated 0.0 0.0 per 100 WBC    Differential Type NOT REPORTED     Seg Neutrophils 63 36 - 65 %    Lymphocytes 26 24 - 43 %    Monocytes 11 3 - 12 %    Eosinophils % 0 (L) 1 - 4 %    Basophils 0 0 - 2 %    Immature Granulocytes 0 0 %    Segs Absolute 3.85 1.50 - 8.10 k/uL    Absolute Lymph # 1.65 1.10 - 3.70 k/uL    Absolute Mono # 0.71 0.10 - 1.20 k/uL    Absolute Eos # <0.03 0.00 - 0.44 k/uL    Basophils # <0.03 0.00 - 0.20 k/uL    Absolute Immature Granulocyte <0.03 0.00 - 0.30 k/uL    WBC Morphology NOT REPORTED     RBC Morphology ANISOCYTOSIS PRESENT     Platelet Estimate NOT REPORTED    Basic Metabolic Panel w/ Reflex to MG   Result Value Ref Range    Glucose 189 (H) 70 - 99 mg/dL    BUN 14 8 - 23 mg/dL    CREATININE 1.07 0.70 - 1.20 mg/dL    Bun/Cre Ratio NOT REPORTED 9 - 20    Calcium 8.6 8.6 - 10.4 mg/dL    Sodium 135 135 - 144 mmol/L    Potassium 4.1 3.7 - 5.3 mmol/L    Chloride 98 98 - 107 mmol/L    CO2 20 20 - 31 mmol/L    Anion Gap 17 9 - 17 mmol/L    GFR Non-African American >60 >60 mL/min    GFR African American >60 >60 mL/min    GFR Comment          GFR Staging NOT REPORTED    Troponin

## 2019-05-26 NOTE — FLOWSHEET NOTE
Assessment: Pt sleeping with no family present as  entered room. Intervention:  left a note from spiritual care and said a silent prayer. Plan:Chaplains will remain available to offer spiritual and emotional support as needed.      05/26/19 1630   Encounter Summary   Services provided to: Patient   Referral/Consult From: Meenu   (5/26 - not assessed)   Complexity of Encounter Low   Length of Encounter 15 minutes   Spiritual Assessment Completed Yes   Spiritual/Jewish   Type Spiritual support   Assessment Sleeping   Intervention Prayer;Sustaining presence/ Ministry of presence   Outcome Did not respond

## 2019-05-27 VITALS
SYSTOLIC BLOOD PRESSURE: 129 MMHG | OXYGEN SATURATION: 98 % | RESPIRATION RATE: 18 BRPM | DIASTOLIC BLOOD PRESSURE: 74 MMHG | HEIGHT: 74 IN | BODY MASS INDEX: 25.67 KG/M2 | WEIGHT: 200 LBS | HEART RATE: 65 BPM | TEMPERATURE: 97.9 F

## 2019-05-27 LAB
ABSOLUTE EOS #: <0.03 K/UL (ref 0–0.44)
ABSOLUTE IMMATURE GRANULOCYTE: <0.03 K/UL (ref 0–0.3)
ABSOLUTE LYMPH #: 1.29 K/UL (ref 1.1–3.7)
ABSOLUTE MONO #: 0.45 K/UL (ref 0.1–1.2)
ANION GAP SERPL CALCULATED.3IONS-SCNC: 9 MMOL/L (ref 9–17)
BASOPHILS # BLD: 0 % (ref 0–2)
BASOPHILS ABSOLUTE: <0.03 K/UL (ref 0–0.2)
BUN BLDV-MCNC: 11 MG/DL (ref 8–23)
BUN/CREAT BLD: ABNORMAL (ref 9–20)
CALCIUM SERPL-MCNC: 8.5 MG/DL (ref 8.6–10.4)
CHLORIDE BLD-SCNC: 100 MMOL/L (ref 98–107)
CO2: 26 MMOL/L (ref 20–31)
CREAT SERPL-MCNC: 1.08 MG/DL (ref 0.7–1.2)
DIFFERENTIAL TYPE: ABNORMAL
EOSINOPHILS RELATIVE PERCENT: 0 % (ref 1–4)
GFR AFRICAN AMERICAN: >60 ML/MIN
GFR NON-AFRICAN AMERICAN: >60 ML/MIN
GFR SERPL CREATININE-BSD FRML MDRD: ABNORMAL ML/MIN/{1.73_M2}
GFR SERPL CREATININE-BSD FRML MDRD: ABNORMAL ML/MIN/{1.73_M2}
GLUCOSE BLD-MCNC: 103 MG/DL (ref 70–99)
GLUCOSE BLD-MCNC: 107 MG/DL (ref 75–110)
GLUCOSE BLD-MCNC: 144 MG/DL (ref 75–110)
HCT VFR BLD CALC: 39.9 % (ref 40.7–50.3)
HEMOGLOBIN: 12.8 G/DL (ref 13–17)
IMMATURE GRANULOCYTES: 0 %
LV EF: 60 %
LVEF MODALITY: NORMAL
LYMPHOCYTES # BLD: 34 % (ref 24–43)
MAGNESIUM: 2.2 MG/DL (ref 1.6–2.6)
MCH RBC QN AUTO: 28.3 PG (ref 25.2–33.5)
MCHC RBC AUTO-ENTMCNC: 32.1 G/DL (ref 28.4–34.8)
MCV RBC AUTO: 88.3 FL (ref 82.6–102.9)
MONOCYTES # BLD: 12 % (ref 3–12)
NRBC AUTOMATED: 0 PER 100 WBC
PARTIAL THROMBOPLASTIN TIME: 54.7 SEC (ref 20.5–30.5)
PDW BLD-RTO: 14.2 % (ref 11.8–14.4)
PLATELET # BLD: 202 K/UL (ref 138–453)
PLATELET # BLD: 209 K/UL (ref 138–453)
PLATELET ESTIMATE: ABNORMAL
PMV BLD AUTO: 9.2 FL (ref 8.1–13.5)
POTASSIUM SERPL-SCNC: 3.5 MMOL/L (ref 3.7–5.3)
RBC # BLD: 4.52 M/UL (ref 4.21–5.77)
RBC # BLD: ABNORMAL 10*6/UL
SEG NEUTROPHILS: 53 % (ref 36–65)
SEGMENTED NEUTROPHILS ABSOLUTE COUNT: 2 K/UL (ref 1.5–8.1)
SODIUM BLD-SCNC: 135 MMOL/L (ref 135–144)
WBC # BLD: 3.8 K/UL (ref 3.5–11.3)
WBC # BLD: ABNORMAL 10*3/UL

## 2019-05-27 PROCEDURE — 6370000000 HC RX 637 (ALT 250 FOR IP): Performed by: INTERNAL MEDICINE

## 2019-05-27 PROCEDURE — G0378 HOSPITAL OBSERVATION PER HR: HCPCS

## 2019-05-27 PROCEDURE — 85025 COMPLETE CBC W/AUTO DIFF WBC: CPT

## 2019-05-27 PROCEDURE — 93306 TTE W/DOPPLER COMPLETE: CPT

## 2019-05-27 PROCEDURE — 82947 ASSAY GLUCOSE BLOOD QUANT: CPT

## 2019-05-27 PROCEDURE — 85730 THROMBOPLASTIN TIME PARTIAL: CPT

## 2019-05-27 PROCEDURE — 6370000000 HC RX 637 (ALT 250 FOR IP): Performed by: STUDENT IN AN ORGANIZED HEALTH CARE EDUCATION/TRAINING PROGRAM

## 2019-05-27 PROCEDURE — 99239 HOSP IP/OBS DSCHRG MGMT >30: CPT | Performed by: INTERNAL MEDICINE

## 2019-05-27 PROCEDURE — 80048 BASIC METABOLIC PNL TOTAL CA: CPT

## 2019-05-27 PROCEDURE — 36415 COLL VENOUS BLD VENIPUNCTURE: CPT

## 2019-05-27 PROCEDURE — 2580000003 HC RX 258: Performed by: STUDENT IN AN ORGANIZED HEALTH CARE EDUCATION/TRAINING PROGRAM

## 2019-05-27 PROCEDURE — 96366 THER/PROPH/DIAG IV INF ADDON: CPT

## 2019-05-27 PROCEDURE — 83735 ASSAY OF MAGNESIUM: CPT

## 2019-05-27 PROCEDURE — 85049 AUTOMATED PLATELET COUNT: CPT

## 2019-05-27 PROCEDURE — 93005 ELECTROCARDIOGRAM TRACING: CPT

## 2019-05-27 RX ORDER — LORAZEPAM 2 MG/ML
1 INJECTION INTRAMUSCULAR
Status: DISCONTINUED | OUTPATIENT
Start: 2019-05-27 | End: 2019-05-27 | Stop reason: HOSPADM

## 2019-05-27 RX ORDER — CARVEDILOL 6.25 MG/1
6.25 TABLET ORAL 2 TIMES DAILY WITH MEALS
Status: DISCONTINUED | OUTPATIENT
Start: 2019-05-27 | End: 2019-05-27 | Stop reason: HOSPADM

## 2019-05-27 RX ORDER — POTASSIUM CHLORIDE 20 MEQ/1
40 TABLET, EXTENDED RELEASE ORAL 2 TIMES DAILY
Qty: 4 TABLET | Refills: 0 | Status: SHIPPED | OUTPATIENT
Start: 2019-05-27 | End: 2020-01-15 | Stop reason: ALTCHOICE

## 2019-05-27 RX ORDER — LORAZEPAM 1 MG/1
4 TABLET ORAL
Status: DISCONTINUED | OUTPATIENT
Start: 2019-05-27 | End: 2019-05-27 | Stop reason: HOSPADM

## 2019-05-27 RX ORDER — CARVEDILOL 6.25 MG/1
6.25 TABLET ORAL 2 TIMES DAILY WITH MEALS
Status: DISCONTINUED | OUTPATIENT
Start: 2019-05-27 | End: 2019-05-27

## 2019-05-27 RX ORDER — LORAZEPAM 1 MG/1
1 TABLET ORAL
Status: DISCONTINUED | OUTPATIENT
Start: 2019-05-27 | End: 2019-05-27 | Stop reason: HOSPADM

## 2019-05-27 RX ORDER — LORAZEPAM 2 MG/ML
2 INJECTION INTRAMUSCULAR
Status: DISCONTINUED | OUTPATIENT
Start: 2019-05-27 | End: 2019-05-27 | Stop reason: HOSPADM

## 2019-05-27 RX ORDER — LORAZEPAM 2 MG/ML
3 INJECTION INTRAMUSCULAR
Status: DISCONTINUED | OUTPATIENT
Start: 2019-05-27 | End: 2019-05-27 | Stop reason: HOSPADM

## 2019-05-27 RX ORDER — CARVEDILOL 3.12 MG/1
3.12 TABLET ORAL 2 TIMES DAILY WITH MEALS
Status: DISCONTINUED | OUTPATIENT
Start: 2019-05-27 | End: 2019-05-27

## 2019-05-27 RX ORDER — LORAZEPAM 2 MG/ML
4 INJECTION INTRAMUSCULAR
Status: DISCONTINUED | OUTPATIENT
Start: 2019-05-27 | End: 2019-05-27 | Stop reason: HOSPADM

## 2019-05-27 RX ORDER — LORAZEPAM 1 MG/1
2 TABLET ORAL
Status: DISCONTINUED | OUTPATIENT
Start: 2019-05-27 | End: 2019-05-27 | Stop reason: HOSPADM

## 2019-05-27 RX ORDER — LORAZEPAM 1 MG/1
3 TABLET ORAL
Status: DISCONTINUED | OUTPATIENT
Start: 2019-05-27 | End: 2019-05-27 | Stop reason: HOSPADM

## 2019-05-27 RX ADMIN — CARVEDILOL 6.25 MG: 6.25 TABLET, FILM COATED ORAL at 09:23

## 2019-05-27 RX ADMIN — Medication 10 ML: at 09:24

## 2019-05-27 RX ADMIN — RIVAROXABAN 20 MG: 20 TABLET, FILM COATED ORAL at 12:29

## 2019-05-27 RX ADMIN — OMEPRAZOLE 10 MG: 10 CAPSULE, DELAYED RELEASE ORAL at 09:24

## 2019-05-27 RX ADMIN — POTASSIUM CHLORIDE 40 MEQ: 20 TABLET, EXTENDED RELEASE ORAL at 12:32

## 2019-05-27 ASSESSMENT — ENCOUNTER SYMPTOMS
NAUSEA: 0
BACK PAIN: 0
COLOR CHANGE: 0
ABDOMINAL DISTENTION: 0
COUGH: 0
SHORTNESS OF BREATH: 0
CONSTIPATION: 0
VOMITING: 0
CHEST TIGHTNESS: 0
WHEEZING: 0
DIARRHEA: 0

## 2019-05-27 ASSESSMENT — PAIN SCALES - GENERAL: PAINLEVEL_OUTOF10: 0

## 2019-05-27 NOTE — PROGRESS NOTES
[facility]  Internal Medicine Residency Program  Inpatient Daily Progress Note  ______________________________________________________________________________    Patient: Apple Malave  YOB: 1960   MRN: [id number]    Acct: [de-identified]     Admit date: 5/25/2019  Today's date: 05/27/19  Number of days in the hospital: 2  Expected Discharge Date: 05/27/19    Admitting Diagnosis: New onset atrial fibrillation [facility])    Subjective:   Patient seen and examined at bedside. Alert and oriented. Stable vitals this AM.  No acute issues overnight. Patient is eating and drinking fine. Had some crackers this morning. The patient denies any complaints of fever, chills, nausea, vomiting, SOB, chest pain, palpitations, cough with sputum, any changes in urinary or bowel habits, muscle or joint pains. Urine toxicology positive for cocaine    Review of Systems   Constitutional: Negative for activity change, chills, diaphoresis, fatigue and fever. HENT: Negative for congestion, dental problem and drooling. Respiratory: Negative for cough, chest tightness, shortness of breath and wheezing. Cardiovascular: Negative for chest pain, palpitations and leg swelling. Gastrointestinal: Negative for abdominal distention, constipation, diarrhea, nausea and vomiting. Genitourinary: Negative for difficulty urinating and hematuria. Musculoskeletal: Negative for arthralgias, back pain and gait problem. Skin: Negative for color change, pallor, rash and wound. Neurological: Negative for dizziness, facial asymmetry, light-headedness and headaches. Psychiatric/Behavioral: Negative for agitation, behavioral problems and confusion.      Objective:   Vital Sign:  /69   Pulse 72   Temp 98.3 °F (36.8 °C) (Oral)   Resp 18   Ht 6' 2\" (1.88 m)   Wt 200 lb (90.7 kg)   SpO2 96%   BMI 25.68 kg/m²       Physical Exam:  General appearance:   alert, well appearing, and transcription may have occurred.

## 2019-05-27 NOTE — CONSULTS
drug overdose. As per patient and chart review, patient was found jama. EMS came and gave narcan, patient regained conciousness. As per patient smoked marijuana mixed with cocaine and heroine. When he came to ED patient rhythm was found to be in A.fib with RVR with HR in 100's. Cardiology was consulted for A.fib. Patient denies any chest pain, SOB, orthopnea, PND, palpitations, no previous significant cardiac history. Patient was started on heparin, during my visit patient in normal sinius rhythm with HR in 60,s. TSH- 1.66, pro-BNP 72. UDS positive for cocaine. [id number] score is 1. Past Medical History:   has a past medical history of Diabetes mellitus ([address] GERD (gastroesophageal reflux disease), Hypertension, Rheumatoid arthritis(714.0), and Wears glasses. Past Surgical History:   has a past surgical history that includes Foot surgery and Colonoscopy (october 2012). Home Medications:    Prior to Admission medications    Medication Sig Start Date End Date Taking? Authorizing Provider   metFORMIN (GLUCOPHAGE) 500 MG tablet TAKE 1 TABLET BY MOUTH DAILY (WITH BREAKFAST) 5/24/19  Yes Celsa Bernabe MD   metoprolol tartrate (LOPRESSOR) 50 MG tablet TAKE 1 TABLET BY MOUTH 2 TIMES DAILY 4/19/19  Yes Celsa Bernabe MD   amLODIPine (NORVASC) 10 MG tablet TAKE 1 TABLET BY MOUTH DAILY 4/19/19  Yes Celsa Bernabe MD   ciclopirox (PENLAC) 8 % solution Apply topically nightly. 1/26/18  Yes Portia West DPM   urea (CARMOL) 20 % cream Apply topically as needed.  1/26/18  Yes Portia West DPM   omeprazole (PRILOSEC) 10 MG delayed release capsule TAKE 1 CAPSULE BY MOUTH DAILY 4/19/19   Celsa Bernabe MD   acetaminophen (AMINOFEN) 325 MG tablet Take 2 tablets by mouth 3 times daily as needed for Pain 1/18/19   Santos Marino MD      Current Facility-Administered Medications: LORazepam (ATIVAN) tablet 1 mg, 1 mg, Oral, Q1H PRN **OR** LORazepam (ATIVAN) injection 1 mg, 1 mg, Intravenous, Q1H PRN **OR** LORazepam (ATIVAN) tablet 2 mg, 2 mg, Oral, Q1H PRN **OR** LORazepam (ATIVAN) injection 2 mg, 2 mg, Intravenous, Q1H PRN **OR** LORazepam (ATIVAN) tablet 3 mg, 3 mg, Oral, Q1H PRN **OR** LORazepam (ATIVAN) injection 3 mg, 3 mg, Intravenous, Q1H PRN **OR** LORazepam (ATIVAN) tablet 4 mg, 4 mg, Oral, Q1H PRN **OR** LORazepam (ATIVAN) injection 4 mg, 4 mg, Intravenous, Q1H PRN  carvedilol (COREG) tablet 6.25 mg, 6.25 mg, Oral, BID WC  omeprazole (PRILOSEC) delayed release capsule 10 mg, 10 mg, Oral, Daily  insulin lispro (HUMALOG) injection vial 0-6 Units, 0-6 Units, Subcutaneous, TID WC  insulin lispro (HUMALOG) injection vial 0-3 Units, 0-3 Units, Subcutaneous, Nightly  sodium chloride flush 0.9 % injection 10 mL, 10 mL, Intravenous, 2 times per day  sodium chloride flush 0.9 % injection 10 mL, 10 mL, Intravenous, PRN  magnesium hydroxide (MILK OF MAGNESIA) 400 MG/5ML suspension 30 mL, 30 mL, Oral, Daily PRN  ondansetron (ZOFRAN) injection 4 mg, 4 mg, Intravenous, Q6H PRN  potassium chloride (KLOR-CON M) extended release tablet 40 mEq, 40 mEq, Oral, PRN **OR** potassium bicarb-citric acid (EFFER-K) effervescent tablet 40 mEq, 40 mEq, Oral, PRN **OR** potassium chloride 10 mEq/100 mL IVPB (Peripheral Line), 10 mEq, Intravenous, PRN  heparin (porcine) injection 4,000 Units, 4,000 Units, Intravenous, PRN  heparin (porcine) injection 2,000 Units, 2,000 Units, Intravenous, PRN  heparin 25,000 units in dextrose 5% 250 mL infusion, 11 Units/kg/hr, Intravenous, Continuous    Allergies:  Motrin [ibuprofen micronized]    Social History:   reports that he quit smoking about 8 months ago. He has never used smokeless tobacco. He reports that he does not drink alcohol or use drugs. Family History: family history includes High Blood Pressure in his mother. No h/o sudden cardiac death. No for premature CAD    REVIEW OF SYSTEMS:    · Constitutional: there has been no unanticipated weight loss.  There's been No change

## 2019-05-27 NOTE — PROGRESS NOTES
Occupational Therapy Not Seen Note    DATE: 2019  Name: Erasto Barbosa  : 1960  MRN: [id number]    Patient not available for Occupational Therapy due to:    Pt independent with functional mobility and functional tasks.  Pt with no OT acute care needs at this time, will defer OT eval.    Next Scheduled Treatment: NA     Electronically signed by ARVIN Tinsley on 2019 at 10:57 AM

## 2019-05-28 LAB
EKG ATRIAL RATE: 141 BPM
EKG ATRIAL RATE: 42 BPM
EKG Q-T INTERVAL: 464 MS
EKG Q-T INTERVAL: 508 MS
EKG QRS DURATION: 90 MS
EKG QRS DURATION: 92 MS
EKG QTC CALCULATION (BAZETT): 498 MS
EKG QTC CALCULATION (BAZETT): 601 MS
EKG R AXIS: 24 DEGREES
EKG R AXIS: 27 DEGREES
EKG T AXIS: 10 DEGREES
EKG T AXIS: 4 DEGREES
EKG VENTRICULAR RATE: 101 BPM
EKG VENTRICULAR RATE: 58 BPM

## 2019-06-07 ENCOUNTER — OFFICE VISIT (OUTPATIENT)
Dept: INTERNAL MEDICINE | Age: 59
End: 2019-06-07
Payer: MEDICAID

## 2019-06-07 VITALS
BODY MASS INDEX: 33.11 KG/M2 | SYSTOLIC BLOOD PRESSURE: 137 MMHG | DIASTOLIC BLOOD PRESSURE: 79 MMHG | HEIGHT: 74 IN | WEIGHT: 258 LBS | HEART RATE: 51 BPM

## 2019-06-07 DIAGNOSIS — I10 ESSENTIAL HYPERTENSION: ICD-10-CM

## 2019-06-07 DIAGNOSIS — I48.91 ATRIAL FIBRILLATION, UNSPECIFIED TYPE (HCC): ICD-10-CM

## 2019-06-07 DIAGNOSIS — R73.03 PRE-DIABETES: ICD-10-CM

## 2019-06-07 DIAGNOSIS — Z12.11 COLON CANCER SCREENING: Primary | ICD-10-CM

## 2019-06-07 PROCEDURE — G8417 CALC BMI ABV UP PARAM F/U: HCPCS | Performed by: STUDENT IN AN ORGANIZED HEALTH CARE EDUCATION/TRAINING PROGRAM

## 2019-06-07 PROCEDURE — G8427 DOCREV CUR MEDS BY ELIG CLIN: HCPCS | Performed by: STUDENT IN AN ORGANIZED HEALTH CARE EDUCATION/TRAINING PROGRAM

## 2019-06-07 PROCEDURE — 99213 OFFICE O/P EST LOW 20 MIN: CPT | Performed by: STUDENT IN AN ORGANIZED HEALTH CARE EDUCATION/TRAINING PROGRAM

## 2019-06-07 PROCEDURE — 1111F DSCHRG MED/CURRENT MED MERGE: CPT | Performed by: STUDENT IN AN ORGANIZED HEALTH CARE EDUCATION/TRAINING PROGRAM

## 2019-06-07 PROCEDURE — 3017F COLORECTAL CA SCREEN DOC REV: CPT | Performed by: STUDENT IN AN ORGANIZED HEALTH CARE EDUCATION/TRAINING PROGRAM

## 2019-06-07 PROCEDURE — 1036F TOBACCO NON-USER: CPT | Performed by: STUDENT IN AN ORGANIZED HEALTH CARE EDUCATION/TRAINING PROGRAM

## 2019-06-07 RX ORDER — AMLODIPINE BESYLATE 10 MG/1
10 TABLET ORAL DAILY
Qty: 90 TABLET | Refills: 1 | Status: SHIPPED | OUTPATIENT
Start: 2019-06-07 | End: 2019-10-09 | Stop reason: SDUPTHER

## 2019-06-07 RX ORDER — METOPROLOL TARTRATE 50 MG/1
50 TABLET, FILM COATED ORAL 2 TIMES DAILY
Qty: 180 TABLET | Refills: 1 | Status: SHIPPED | OUTPATIENT
Start: 2019-06-07 | End: 2019-10-09 | Stop reason: SDUPTHER

## 2019-06-07 ASSESSMENT — ENCOUNTER SYMPTOMS
SINUS PAIN: 0
FACIAL SWELLING: 0
COLOR CHANGE: 0
ABDOMINAL PAIN: 0
VOICE CHANGE: 0
TROUBLE SWALLOWING: 0
SINUS PRESSURE: 0
SORE THROAT: 0
EYE REDNESS: 0
VOMITING: 0
CONSTIPATION: 0
BACK PAIN: 1
STRIDOR: 0
DIARRHEA: 0
APNEA: 0
EYE DISCHARGE: 0
EYE ITCHING: 0
CHEST TIGHTNESS: 0
NAUSEA: 0
SHORTNESS OF BREATH: 0
RHINORRHEA: 0
WHEEZING: 0

## 2019-06-07 NOTE — PROGRESS NOTES
[facility]  [facility] [address] [facility]  [address] [facility] [address] [facility]  Dept: [phone]  Dept Fax: [phone]    Office Progress/Follow Up Note  Date ofpatient's visit: 6/7/2019  Patient's Name:  Shannan Alvarado YOB: 1960            Patient Care Team:  Sharita Barreto MD as PCP - General (Internal Medicine)  ================================================================    REASON FOR VISIT/CHIEF COMPLAINT:    Hypertension (4 month follow up ) and Diabetes (pt was in hospital recently)    HISTORY OF PRESENTING ILLNESS:    History was obtained from: patient, electronic medical record. Shannan Alvarado a 59 y.o. is here for a follow-up for a follow-up for hypertension and prediabetes. Hypertension: Patient is on Lopressor 50 mg twice a day, Norvasc 10 mg once a day, blood pressure is controlled in the clinic. Denies any medication side effects or headache, chest pain shortness of breath or pedal edema. Prediabetes: Last HbA1c checked was in January 2019 which was 5.8, patient is on metformin 500 twice a day. Has to schedule an appointment with his ophthalmologist.  Diabetic foot exam was done at the clinic last time in January, 2019 which was normal.    Chronic low back pain: X-ray lumbar spine showed degenerative disc disease, patient states that that pain has continued. Patient is allergic to NSAIDs, he states that he tried back exercises which have not helped him, patient is not agreeable to go to physical therapy. Patient states that he can go to [facility] or [facility] for pain medication, explain to patient that we cannot prescribe him opiates. Patient receptive to the same. Patient x-ray discussed with him. Recent hospital admission for drug overdose due to cocaine and new onset A. fib with RVR, patient converted to normal sinus rhythm on his own. Patient was started on Xarelto in the hospital.  Will give follow up with cardiology. Patient's heart rate is 51 here. Patient Active Problem List   Diagnosis    Callus of foot    S/P hammer toe correction    Pre-diabetes    Gastroesophageal reflux disease    Essential hypertension    New onset atrial fibrillation (HCC)    Smoker    Alcoholic ([address]    A-fib ([facility] [address]       Health Maintenance Due   Topic Date Due    Diabetic foot exam  1968    Diabetic retinal exam  1968    Diabetic microalbuminuria test  2018    Colon Cancer Screen FIT/FOBT  2019       Allergies   Allergen Reactions    Motrin [Ibuprofen Micronized] Anaphylaxis     Swelling to throat         Current Outpatient Medications   Medication Sig Dispense Refill    metFORMIN (GLUCOPHAGE) 500 MG tablet TAKE 1 TABLET BY MOUTH DAILY (WITH BREAKFAST) 180 tablet 1    metoprolol tartrate (LOPRESSOR) 50 MG tablet TAKE 1 TABLET BY MOUTH 2 TIMES DAILY 180 tablet 1    omeprazole (PRILOSEC) 10 MG delayed release capsule TAKE 1 CAPSULE BY MOUTH DAILY 90 capsule 1    amLODIPine (NORVASC) 10 MG tablet TAKE 1 TABLET BY MOUTH DAILY 90 tablet 1    ciclopirox (PENLAC) 8 % solution Apply topically nightly. 6 mL 3    urea (CARMOL) 20 % cream Apply topically as needed. 480 g 3    rivaroxaban (XARELTO) 20 MG TABS tablet Take 1 tablet by mouth daily 30 tablet 2    potassium chloride (KLOR-CON M) 20 MEQ extended release tablet Take 2 tablets by mouth 2 times daily for 1 day 4 tablet 0     No current facility-administered medications for this visit.         Social History     Tobacco Use    Smoking status: Former Smoker     Last attempt to quit: 2018     Years since quittin.7    Smokeless tobacco: Never Used   Substance Use Topics    Alcohol use: No     Alcohol/week: 0.0 oz    Drug use: No       Family History   Problem Relation Age of Onset    High Blood Pressure Mother         REVIEW OF SYSTEMS:  Review of Systems   Constitutional: Negative for activity change, appetite change, fatigue, fever and unexpected weight change. HENT: Negative for congestion, dental problem, facial swelling, mouth sores, rhinorrhea, sinus pressure, sinus pain, sore throat, tinnitus, trouble swallowing and voice change. Eyes: Negative for discharge, redness and itching. Respiratory: Negative for apnea, chest tightness, shortness of breath, wheezing and stridor. Cardiovascular: Negative for chest pain, palpitations and leg swelling. Gastrointestinal: Negative for abdominal pain, constipation, diarrhea, nausea and vomiting. Endocrine: Negative for cold intolerance and heat intolerance. Genitourinary: Negative for difficulty urinating, dysuria, flank pain, frequency and urgency. Musculoskeletal: Positive for back pain. Negative for arthralgias, joint swelling, neck pain and neck stiffness. Skin: Negative for color change, pallor, rash and wound. Neurological: Negative for dizziness, tremors, syncope, facial asymmetry, speech difficulty, weakness, light-headedness, numbness and headaches. Psychiatric/Behavioral: Negative for agitation, behavioral problems, confusion, dysphoric mood, hallucinations, self-injury and suicidal ideas. The patient is not nervous/anxious and is not hyperactive. PHYSICAL EXAM:  Vitals:    06/07/19 1130   BP: 137/79   Site: Right Upper Arm   Position: Sitting   Cuff Size: Medium Adult   Pulse: 51   Weight: 258 lb (117 kg)   Height: 6' 2\" (1.88 m)     BP Readings from Last 3 Encounters:   06/07/19 137/79   05/27/19 129/74   02/08/19 135/81        Physical Exam   Constitutional: He is oriented to person, place, and time. He appears well-developed and well-nourished. No distress. HENT:   Head: Normocephalic and atraumatic. Eyes: Pupils are equal, round, and reactive to light. Conjunctivae and EOM are normal. Right eye exhibits no discharge. Left eye exhibits no discharge. Neck: Normal range of motion. Neck supple. No thyromegaly present.    Cardiovascular: Normal rate, regular rhythm, normal heart sounds and intact distal pulses. Exam reveals no friction rub. No murmur heard. Pulmonary/Chest: Effort normal and breath sounds normal. No stridor. No respiratory distress. Abdominal: Soft. Bowel sounds are normal. He exhibits no distension. There is no tenderness. Musculoskeletal: Normal range of motion. He exhibits no edema, tenderness or deformity. Neurological: He is alert and oriented to person, place, and time. No cranial nerve deficit. Skin: Skin is warm and dry. No rash noted. He is not diaphoretic. No erythema. Psychiatric: He has a normal mood and affect. His behavior is normal. Judgment and thought content normal.         DIAGNOSTIC FINDINGS:  CBC:  Lab Results   Component Value Date    WBC 3.8 05/27/2019    HGB 12.8 05/27/2019     05/27/2019       BMP:    Lab Results   Component Value Date     05/27/2019    K 3.5 05/27/2019     05/27/2019    CO2 26 05/27/2019    BUN 11 05/27/2019    CREATININE 1.08 05/27/2019    GLUCOSE 103 05/27/2019    GLUCOSE 89 02/02/2012       HEMOGLOBIN A1C:   Lab Results   Component Value Date    LABA1C 5.8 01/18/2019       FASTING LIPID PANEL:  Lab Results   Component Value Date    CHOL 122 04/09/2019    HDL 47 04/09/2019    TRIG 132 04/09/2019       ASSESSMENT AND PLAN:  Anita Joya was seen today for hypertension and diabetes. Diagnoses and all orders for this visit:    Colon cancer screening    Essential hypertension    Pre-diabetes    Atrial fibrillation, unspecified type ([facility] [address]      FOLLOW UP AND INSTRUCTIONS:    Return in about 4 months (around 10/7/2019), or if symptoms worsen or fail to improve. · Anita Joya received counseling on the following healthy behaviors: nutrition, exercise and medication adherence    · Discussed use, benefit, and side effects of prescribed medications. Barriers to medication compliance addressed. All patient questions answered. Pt voiced understanding.     · Patient given educational materials - see patient instructions    Baron Teixeira MD      Department of Internal Medicine  [facility], [place]         6/7/2019, 12:18 PM   Attending Physician Statement  I have discussed the care of Jairo Houston, including pertinent history and exam findings,  with the resident. I have reviewed the key elements of all parts of the encounter with the resident. I agree with the assessment, plan and orders as documented by the resident. (GE Modifier)  SP hosp stay, +cocaine, new a-fib, now on xarelto. Will refer for cardiology outpt appt.

## 2019-06-07 NOTE — PATIENT INSTRUCTIONS
Your doctor has ordered blood or urine testing. You can get this testing done at the Lab located on the first floor of the [facility], or at any other [facility]. Please stop at Main Registration, before going to the lab, as you must be registered first.     Please get this lab done before your next appointment          An order for cologuard was placed by your physician, the company will be contacting within the next 2 days and will mail the test. Please contact the clinic at [phone] if not heard from in 1 week     You will be contacted with an appointment with Dr. Kenny Aparicio (Cardiologist)  The scheduling number is on the referral if you do not receive an appointment within a week. Return appointment card and Summary of Care was reviewed and copy was given to the patient.   EMILE

## 2019-06-10 LAB
EKG ATRIAL RATE: 64 BPM
EKG P AXIS: 56 DEGREES
EKG P-R INTERVAL: 146 MS
EKG Q-T INTERVAL: 458 MS
EKG QRS DURATION: 86 MS
EKG QTC CALCULATION (BAZETT): 472 MS
EKG R AXIS: 29 DEGREES
EKG T AXIS: 32 DEGREES
EKG VENTRICULAR RATE: 64 BPM

## 2019-07-01 ENCOUNTER — OFFICE VISIT (OUTPATIENT)
Dept: PODIATRY | Age: 59
End: 2019-07-01
Payer: MEDICAID

## 2019-07-01 VITALS
WEIGHT: 201 LBS | DIASTOLIC BLOOD PRESSURE: 64 MMHG | HEART RATE: 63 BPM | HEIGHT: 74 IN | BODY MASS INDEX: 25.8 KG/M2 | SYSTOLIC BLOOD PRESSURE: 114 MMHG

## 2019-07-01 DIAGNOSIS — E11.9 TYPE 2 DIABETES MELLITUS WITHOUT COMPLICATION, WITHOUT LONG-TERM CURRENT USE OF INSULIN (HCC): Primary | ICD-10-CM

## 2019-07-01 DIAGNOSIS — L84 CALLUS OF FOOT: ICD-10-CM

## 2019-07-01 DIAGNOSIS — M79.671 PAIN IN BOTH FEET: ICD-10-CM

## 2019-07-01 DIAGNOSIS — M20.42 HAMMER TOES OF BOTH FEET: ICD-10-CM

## 2019-07-01 DIAGNOSIS — B35.1 ONYCHOMYCOSIS: ICD-10-CM

## 2019-07-01 DIAGNOSIS — M20.41 HAMMER TOES OF BOTH FEET: ICD-10-CM

## 2019-07-01 DIAGNOSIS — M79.672 PAIN IN BOTH FEET: ICD-10-CM

## 2019-07-01 LAB — HBA1C MFR BLD: 5.6 %

## 2019-07-01 PROCEDURE — 83036 HEMOGLOBIN GLYCOSYLATED A1C: CPT | Performed by: STUDENT IN AN ORGANIZED HEALTH CARE EDUCATION/TRAINING PROGRAM

## 2019-07-01 PROCEDURE — 11057 PARNG/CUTG B9 HYPRKR LES >4: CPT | Performed by: STUDENT IN AN ORGANIZED HEALTH CARE EDUCATION/TRAINING PROGRAM

## 2019-07-01 PROCEDURE — G8427 DOCREV CUR MEDS BY ELIG CLIN: HCPCS | Performed by: STUDENT IN AN ORGANIZED HEALTH CARE EDUCATION/TRAINING PROGRAM

## 2019-07-01 PROCEDURE — 11721 DEBRIDE NAIL 6 OR MORE: CPT | Performed by: STUDENT IN AN ORGANIZED HEALTH CARE EDUCATION/TRAINING PROGRAM

## 2019-07-01 PROCEDURE — G8417 CALC BMI ABV UP PARAM F/U: HCPCS | Performed by: STUDENT IN AN ORGANIZED HEALTH CARE EDUCATION/TRAINING PROGRAM

## 2019-07-01 PROCEDURE — 3017F COLORECTAL CA SCREEN DOC REV: CPT | Performed by: STUDENT IN AN ORGANIZED HEALTH CARE EDUCATION/TRAINING PROGRAM

## 2019-07-01 PROCEDURE — 3044F HG A1C LEVEL LT 7.0%: CPT | Performed by: STUDENT IN AN ORGANIZED HEALTH CARE EDUCATION/TRAINING PROGRAM

## 2019-07-01 PROCEDURE — 2022F DILAT RTA XM EVC RTNOPTHY: CPT | Performed by: STUDENT IN AN ORGANIZED HEALTH CARE EDUCATION/TRAINING PROGRAM

## 2019-07-01 PROCEDURE — 1036F TOBACCO NON-USER: CPT | Performed by: STUDENT IN AN ORGANIZED HEALTH CARE EDUCATION/TRAINING PROGRAM

## 2019-07-01 PROCEDURE — 99212 OFFICE O/P EST SF 10 MIN: CPT | Performed by: STUDENT IN AN ORGANIZED HEALTH CARE EDUCATION/TRAINING PROGRAM

## 2019-09-09 ENCOUNTER — TELEPHONE (OUTPATIENT)
Dept: PODIATRY | Age: 59
End: 2019-09-09

## 2019-09-23 ENCOUNTER — TELEPHONE (OUTPATIENT)
Dept: INTERNAL MEDICINE | Age: 59
End: 2019-09-23

## 2019-10-09 ENCOUNTER — OFFICE VISIT (OUTPATIENT)
Dept: INTERNAL MEDICINE | Age: 59
End: 2019-10-09
Payer: MEDICAID

## 2019-10-09 VITALS
HEIGHT: 74 IN | WEIGHT: 215 LBS | DIASTOLIC BLOOD PRESSURE: 81 MMHG | HEART RATE: 51 BPM | SYSTOLIC BLOOD PRESSURE: 144 MMHG | BODY MASS INDEX: 27.59 KG/M2

## 2019-10-09 DIAGNOSIS — Z23 NEEDS FLU SHOT: ICD-10-CM

## 2019-10-09 DIAGNOSIS — R39.9 LOWER URINARY TRACT SYMPTOMS (LUTS): ICD-10-CM

## 2019-10-09 DIAGNOSIS — R73.03 PRE-DIABETES: ICD-10-CM

## 2019-10-09 DIAGNOSIS — I48.0 PAROXYSMAL ATRIAL FIBRILLATION (HCC): ICD-10-CM

## 2019-10-09 DIAGNOSIS — I10 ESSENTIAL HYPERTENSION: Primary | ICD-10-CM

## 2019-10-09 DIAGNOSIS — Z12.11 COLON CANCER SCREENING: ICD-10-CM

## 2019-10-09 DIAGNOSIS — K21.9 GASTROESOPHAGEAL REFLUX DISEASE, ESOPHAGITIS PRESENCE NOT SPECIFIED: ICD-10-CM

## 2019-10-09 PROBLEM — I48.91 NEW ONSET ATRIAL FIBRILLATION (HCC): Status: RESOLVED | Noted: 2019-05-25 | Resolved: 2019-10-09

## 2019-10-09 LAB — HBA1C MFR BLD: 5.9 %

## 2019-10-09 PROCEDURE — G0008 ADMIN INFLUENZA VIRUS VAC: HCPCS | Performed by: STUDENT IN AN ORGANIZED HEALTH CARE EDUCATION/TRAINING PROGRAM

## 2019-10-09 PROCEDURE — 1036F TOBACCO NON-USER: CPT | Performed by: STUDENT IN AN ORGANIZED HEALTH CARE EDUCATION/TRAINING PROGRAM

## 2019-10-09 PROCEDURE — 99211 OFF/OP EST MAY X REQ PHY/QHP: CPT | Performed by: INTERNAL MEDICINE

## 2019-10-09 PROCEDURE — 99213 OFFICE O/P EST LOW 20 MIN: CPT | Performed by: STUDENT IN AN ORGANIZED HEALTH CARE EDUCATION/TRAINING PROGRAM

## 2019-10-09 PROCEDURE — G8417 CALC BMI ABV UP PARAM F/U: HCPCS | Performed by: STUDENT IN AN ORGANIZED HEALTH CARE EDUCATION/TRAINING PROGRAM

## 2019-10-09 PROCEDURE — G8482 FLU IMMUNIZE ORDER/ADMIN: HCPCS | Performed by: STUDENT IN AN ORGANIZED HEALTH CARE EDUCATION/TRAINING PROGRAM

## 2019-10-09 PROCEDURE — G8427 DOCREV CUR MEDS BY ELIG CLIN: HCPCS | Performed by: STUDENT IN AN ORGANIZED HEALTH CARE EDUCATION/TRAINING PROGRAM

## 2019-10-09 PROCEDURE — 83036 HEMOGLOBIN GLYCOSYLATED A1C: CPT | Performed by: STUDENT IN AN ORGANIZED HEALTH CARE EDUCATION/TRAINING PROGRAM

## 2019-10-09 PROCEDURE — 3017F COLORECTAL CA SCREEN DOC REV: CPT | Performed by: STUDENT IN AN ORGANIZED HEALTH CARE EDUCATION/TRAINING PROGRAM

## 2019-10-09 RX ORDER — OMEPRAZOLE 10 MG/1
10 CAPSULE, DELAYED RELEASE ORAL DAILY
Qty: 90 CAPSULE | Refills: 3 | Status: SHIPPED | OUTPATIENT
Start: 2019-10-09 | End: 2020-01-15 | Stop reason: SDUPTHER

## 2019-10-09 RX ORDER — METOPROLOL TARTRATE 50 MG/1
50 TABLET, FILM COATED ORAL 2 TIMES DAILY
Qty: 180 TABLET | Refills: 3 | Status: SHIPPED | OUTPATIENT
Start: 2019-10-09 | End: 2020-01-15 | Stop reason: SDUPTHER

## 2019-10-09 RX ORDER — AMLODIPINE BESYLATE 10 MG/1
10 TABLET ORAL DAILY
Qty: 90 TABLET | Refills: 3 | Status: SHIPPED | OUTPATIENT
Start: 2019-10-09 | End: 2020-01-15 | Stop reason: SDUPTHER

## 2019-10-09 ASSESSMENT — ENCOUNTER SYMPTOMS
CHEST TIGHTNESS: 0
RECTAL PAIN: 0
FACIAL SWELLING: 0
PHOTOPHOBIA: 0
EYE DISCHARGE: 0
CONSTIPATION: 0
COLOR CHANGE: 0
SHORTNESS OF BREATH: 0
APNEA: 0
EYE REDNESS: 0
SORE THROAT: 0
EYE PAIN: 0
STRIDOR: 0
ABDOMINAL PAIN: 0
WHEEZING: 0
BACK PAIN: 1
CHOKING: 0
RHINORRHEA: 0
EYE ITCHING: 0
ANAL BLEEDING: 0
COUGH: 0
BLOOD IN STOOL: 0
VOMITING: 0
SINUS PAIN: 0
NAUSEA: 0
DIARRHEA: 0
ABDOMINAL DISTENTION: 0
SINUS PRESSURE: 0

## 2019-10-09 ASSESSMENT — PATIENT HEALTH QUESTIONNAIRE - PHQ9
1. LITTLE INTEREST OR PLEASURE IN DOING THINGS: 0
SUM OF ALL RESPONSES TO PHQ9 QUESTIONS 1 & 2: 0
SUM OF ALL RESPONSES TO PHQ QUESTIONS 1-9: 0
2. FEELING DOWN, DEPRESSED OR HOPELESS: 0
SUM OF ALL RESPONSES TO PHQ QUESTIONS 1-9: 0

## 2019-10-14 DIAGNOSIS — I48.0 PAROXYSMAL ATRIAL FIBRILLATION (HCC): ICD-10-CM

## 2019-10-28 ENCOUNTER — OFFICE VISIT (OUTPATIENT)
Dept: PODIATRY | Age: 59
End: 2019-10-28
Payer: MEDICAID

## 2019-10-28 VITALS
SYSTOLIC BLOOD PRESSURE: 128 MMHG | BODY MASS INDEX: 26.95 KG/M2 | WEIGHT: 210 LBS | HEIGHT: 74 IN | HEART RATE: 58 BPM | DIASTOLIC BLOOD PRESSURE: 76 MMHG

## 2019-10-28 DIAGNOSIS — M79.672 PAIN IN BOTH FEET: ICD-10-CM

## 2019-10-28 DIAGNOSIS — M20.11 HAV (HALLUX ABDUCTO VALGUS), RIGHT: ICD-10-CM

## 2019-10-28 DIAGNOSIS — B35.1 ONYCHOMYCOSIS: ICD-10-CM

## 2019-10-28 DIAGNOSIS — M20.42 HAMMER TOES OF BOTH FEET: ICD-10-CM

## 2019-10-28 DIAGNOSIS — M79.671 PAIN IN BOTH FEET: ICD-10-CM

## 2019-10-28 DIAGNOSIS — M20.12 HAV (HALLUX ABDUCTO VALGUS), LEFT: ICD-10-CM

## 2019-10-28 DIAGNOSIS — M20.41 HAMMER TOES OF BOTH FEET: ICD-10-CM

## 2019-10-28 DIAGNOSIS — L84 CALLUS OF FOOT: ICD-10-CM

## 2019-10-28 DIAGNOSIS — E11.9 TYPE 2 DIABETES MELLITUS WITHOUT COMPLICATION, WITHOUT LONG-TERM CURRENT USE OF INSULIN (HCC): Primary | ICD-10-CM

## 2019-10-28 PROCEDURE — 99213 OFFICE O/P EST LOW 20 MIN: CPT | Performed by: STUDENT IN AN ORGANIZED HEALTH CARE EDUCATION/TRAINING PROGRAM

## 2019-10-28 PROCEDURE — G8482 FLU IMMUNIZE ORDER/ADMIN: HCPCS | Performed by: STUDENT IN AN ORGANIZED HEALTH CARE EDUCATION/TRAINING PROGRAM

## 2019-10-28 PROCEDURE — G8417 CALC BMI ABV UP PARAM F/U: HCPCS | Performed by: STUDENT IN AN ORGANIZED HEALTH CARE EDUCATION/TRAINING PROGRAM

## 2019-10-28 PROCEDURE — 3017F COLORECTAL CA SCREEN DOC REV: CPT | Performed by: STUDENT IN AN ORGANIZED HEALTH CARE EDUCATION/TRAINING PROGRAM

## 2019-10-28 PROCEDURE — 3044F HG A1C LEVEL LT 7.0%: CPT | Performed by: STUDENT IN AN ORGANIZED HEALTH CARE EDUCATION/TRAINING PROGRAM

## 2019-10-28 PROCEDURE — 11721 DEBRIDE NAIL 6 OR MORE: CPT | Performed by: STUDENT IN AN ORGANIZED HEALTH CARE EDUCATION/TRAINING PROGRAM

## 2019-10-28 PROCEDURE — 99212 OFFICE O/P EST SF 10 MIN: CPT | Performed by: STUDENT IN AN ORGANIZED HEALTH CARE EDUCATION/TRAINING PROGRAM

## 2019-10-28 PROCEDURE — 1036F TOBACCO NON-USER: CPT | Performed by: STUDENT IN AN ORGANIZED HEALTH CARE EDUCATION/TRAINING PROGRAM

## 2019-10-28 PROCEDURE — G8427 DOCREV CUR MEDS BY ELIG CLIN: HCPCS | Performed by: STUDENT IN AN ORGANIZED HEALTH CARE EDUCATION/TRAINING PROGRAM

## 2019-10-28 PROCEDURE — 2022F DILAT RTA XM EVC RTNOPTHY: CPT | Performed by: STUDENT IN AN ORGANIZED HEALTH CARE EDUCATION/TRAINING PROGRAM

## 2019-10-28 PROCEDURE — 11056 PARNG/CUTG B9 HYPRKR LES 2-4: CPT | Performed by: STUDENT IN AN ORGANIZED HEALTH CARE EDUCATION/TRAINING PROGRAM

## 2019-11-11 ENCOUNTER — TELEPHONE (OUTPATIENT)
Dept: INTERNAL MEDICINE | Age: 59
End: 2019-11-11

## 2019-11-11 DIAGNOSIS — I48.0 PAROXYSMAL ATRIAL FIBRILLATION (HCC): Primary | ICD-10-CM

## 2019-12-31 ENCOUNTER — TELEPHONE (OUTPATIENT)
Dept: INTERNAL MEDICINE | Age: 59
End: 2019-12-31

## 2020-01-10 ENCOUNTER — HOSPITAL ENCOUNTER (OUTPATIENT)
Age: 60
Setting detail: SPECIMEN
Discharge: HOME | End: 2020-01-10
Payer: MEDICAID

## 2020-01-10 ENCOUNTER — OFFICE VISIT (OUTPATIENT)
Dept: UROLOGY | Age: 60
End: 2020-01-10
Payer: MEDICAID

## 2020-01-10 VITALS
SYSTOLIC BLOOD PRESSURE: 151 MMHG | DIASTOLIC BLOOD PRESSURE: 79 MMHG | BODY MASS INDEX: 26.36 KG/M2 | WEIGHT: 205.4 LBS | HEART RATE: 61 BPM | HEIGHT: 74 IN

## 2020-01-10 LAB
APPEARANCE FLUID: ABNORMAL
BILIRUBIN, POC: ABNORMAL
BLOOD URINE, POC: ABNORMAL
CLARITY, POC: CLEAR
COLOR, POC: YELLOW
GLUCOSE URINE, POC: ABNORMAL
KETONES, POC: ABNORMAL
LEUKOCYTE EST, POC: ABNORMAL
NITRITE, POC: ABNORMAL
PH, POC: 6
PROTEIN, POC: ABNORMAL
SPECIFIC GRAVITY, POC: 1.01
UROBILINOGEN, POC: 0.2

## 2020-01-10 PROCEDURE — 99204 OFFICE O/P NEW MOD 45 MIN: CPT | Performed by: UROLOGY

## 2020-01-10 PROCEDURE — 1036F TOBACCO NON-USER: CPT | Performed by: UROLOGY

## 2020-01-10 PROCEDURE — G8482 FLU IMMUNIZE ORDER/ADMIN: HCPCS | Performed by: UROLOGY

## 2020-01-10 PROCEDURE — G8427 DOCREV CUR MEDS BY ELIG CLIN: HCPCS | Performed by: UROLOGY

## 2020-01-10 PROCEDURE — 3017F COLORECTAL CA SCREEN DOC REV: CPT | Performed by: UROLOGY

## 2020-01-10 PROCEDURE — 81002 URINALYSIS NONAUTO W/O SCOPE: CPT | Performed by: UROLOGY

## 2020-01-10 PROCEDURE — G8417 CALC BMI ABV UP PARAM F/U: HCPCS | Performed by: UROLOGY

## 2020-01-10 PROCEDURE — 99212 OFFICE O/P EST SF 10 MIN: CPT

## 2020-01-10 RX ORDER — OXYBUTYNIN CHLORIDE 10 MG/1
10 TABLET, EXTENDED RELEASE ORAL DAILY
Qty: 30 TABLET | Refills: 3 | Status: SHIPPED | OUTPATIENT
Start: 2020-01-10 | End: 2020-03-13 | Stop reason: SDUPTHER

## 2020-01-10 RX ORDER — TAMSULOSIN HYDROCHLORIDE 0.4 MG/1
0.4 CAPSULE ORAL DAILY
Qty: 90 CAPSULE | Refills: 1 | Status: SHIPPED | OUTPATIENT
Start: 2020-01-10 | End: 2020-02-09

## 2020-01-10 NOTE — PROGRESS NOTES
Teresa Reyes MD   Urology Clinic Consultation / New Patient Visit      Patient:  Christina Elena  YOB: 1960  Date: 1/10/2020    HISTORY OF PRESENT ILLNESS:   The patient is a 62 y.o. male who presents today for evaluation of the following problem(s): frequency, urgency, slow stream  Overall the problem(s) : are worsening. Associated Symptoms: No dysuria, gross hematuria. Pain Severity: Pain Score:   0 - No pain    Summary of old records: never seen urology previous  (Patient's old records, notes and chart reviewed and summarized above.)    Duration: 2 years  Location: bladder and prostate  alleviating factors: none  Aggravating factors: fluid intake  Associated with: slow stream, urgency and incomplete emptying  Frequency: all the time    No hematuria  No hx of kidney stones      Last several PSA's:  No results found for: PSA    Last total testosterone:  No results found for: TESTOSTERONE    Urinalysis today:  No results found for this visit on 01/10/20.       Last BUN and creatinine:  Lab Results   Component Value Date    BUN 11 05/27/2019     Lab Results   Component Value Date    CREATININE 1.08 05/27/2019       Imaging Reviewed during this Office Visit:   (results were independently reviewed by physician and radiology report verified)    PAST MEDICAL, FAMILY AND SOCIAL HISTORY:  Past Medical History:   Diagnosis Date    Diabetes mellitus ([address]     GERD (gastroesophageal reflux disease)     Hypertension     Rheumatoid arthritis(714.0)     newly diagnosed    Wears glasses      Past Surgical History:   Procedure Laterality Date    COLONOSCOPY  october 2012    FOOT SURGERY      Left foot surgery HDS 2-4 and bunion      Family History   Problem Relation Age of Onset    High Blood Pressure Mother      Outpatient Medications Marked as Taking for the 1/10/20 encounter (Office Visit) with Rekha Marin MD   Medication Sig Dispense Refill    tamsulosin (FLOMAX) 0.4 MG capsule Take 1 capsule by mouth daily 90 capsule 1    oxybutynin (DITROPAN XL) 10 MG extended release tablet Take 1 tablet by mouth daily 30 tablet 3    apixaban (ELIQUIS) 5 MG TABS tablet Take 1 tablet by mouth 2 times daily 180 tablet 1    urea 10 % lotion Apply topically as needed. 1 Bottle 1    metoprolol tartrate (LOPRESSOR) 50 MG tablet Take 1 tablet by mouth 2 times daily 180 tablet 3    metFORMIN (GLUCOPHAGE) 500 MG tablet Take 1 tablet by mouth daily (with breakfast) 180 tablet 3    amLODIPine (NORVASC) 10 MG tablet Take 1 tablet by mouth daily 90 tablet 3    omeprazole (PRILOSEC) 10 MG delayed release capsule Take 1 capsule by mouth daily 90 capsule 3    urea (CARMOL) 20 % cream Apply topically as needed. 480 g 3       Motrin [ibuprofen micronized]  Social History     Tobacco Use   Smoking Status Former Smoker    Last attempt to quit: 2018    Years since quittin.3   Smokeless Tobacco Never Used       Social History     Substance and Sexual Activity   Alcohol Use No    Alcohol/week: 0.0 standard drinks       REVIEW OF SYSTEMS:  Constitutional: negative  Eyes: negative  Respiratory: negative  Cardiovascular: negative  Gastrointestinal: negative  Musculoskeletal: negative  Genitourinary: negative except for what is in HPI  Skin: negative   Neurological: negative  Hematological/Lymphatic: negative  Psychological: negative    Physical Exam:    This a 62 y.o. male   Vitals:    01/10/20 0854   BP: (!) 151/79   Pulse: 61     Constitutional: Patient in no acute distress; Neuro: alert and oriented to person place and time. Psych: Mood and affect normal.  Skin: Normal  Lungs: Respiratory effort normal  Cardiovascular:  Normal peripheral pulses  Abdomen: Soft, non-tender, non-distended with no CVA, flank pain, hepatosplenomegaly or hernia. Kidneys normal.  Bladder non-tender and not distended. Lymphatics: no palpable lymphadenopathy  . Assessment and Plan      1.  Lower urinary tract symptoms (LUTS) 2. Urgency of urination    3. Urinary frequency    4. BPH with obstruction/lower urinary tract symptoms    5. Overactive bladder    6. Nocturia           Plan:      Return in about 2 months (around 3/10/2020). BPH: start flomax, check UA and PSA  Discussed things that can elevated PSA  Avoid sexual activity and bike riding 1-2 weeks prior to blood draw  OAB: trial of Ditropan 10 mg daily  Nocturia: Patient instructed to limit fluid intake 2-3 hours prior to bedtime to minimize nocturia or nocturnal incontinence.     Follow up 6-8 weeks to reassess symptoms           Yudelka Fernandez MD  [facility] Urology

## 2020-01-11 LAB
-: NORMAL
AMORPHOUS: NORMAL
BACTERIA: NORMAL
BILIRUBIN URINE: NEGATIVE
CASTS UA: NORMAL /LPF (ref 0–8)
COLOR: YELLOW
CRYSTALS, UA: NORMAL /HPF
EPITHELIAL CELLS UA: NORMAL /HPF (ref 0–5)
GLUCOSE URINE: NEGATIVE
KETONES, URINE: NEGATIVE
LEUKOCYTE ESTERASE, URINE: NEGATIVE
MUCUS: NORMAL
NITRITE, URINE: NEGATIVE
OTHER OBSERVATIONS UA: NORMAL
PH UA: 6 (ref 5–8)
PROTEIN UA: NEGATIVE
RBC UA: NORMAL /HPF (ref 0–4)
RENAL EPITHELIAL, UA: NORMAL /HPF
SPECIFIC GRAVITY UA: 1.01 (ref 1–1.03)
TRICHOMONAS: NORMAL
TURBIDITY: CLEAR
URINE HGB: NEGATIVE
UROBILINOGEN, URINE: NORMAL
WBC UA: NORMAL /HPF (ref 0–5)
YEAST: NORMAL

## 2020-01-15 ENCOUNTER — OFFICE VISIT (OUTPATIENT)
Dept: INTERNAL MEDICINE | Age: 60
End: 2020-01-15
Payer: MEDICAID

## 2020-01-15 VITALS
HEIGHT: 74 IN | HEART RATE: 51 BPM | WEIGHT: 210 LBS | DIASTOLIC BLOOD PRESSURE: 74 MMHG | SYSTOLIC BLOOD PRESSURE: 136 MMHG | BODY MASS INDEX: 26.95 KG/M2

## 2020-01-15 PROCEDURE — G8427 DOCREV CUR MEDS BY ELIG CLIN: HCPCS | Performed by: STUDENT IN AN ORGANIZED HEALTH CARE EDUCATION/TRAINING PROGRAM

## 2020-01-15 PROCEDURE — 99211 OFF/OP EST MAY X REQ PHY/QHP: CPT | Performed by: INTERNAL MEDICINE

## 2020-01-15 PROCEDURE — 99213 OFFICE O/P EST LOW 20 MIN: CPT | Performed by: STUDENT IN AN ORGANIZED HEALTH CARE EDUCATION/TRAINING PROGRAM

## 2020-01-15 PROCEDURE — G8417 CALC BMI ABV UP PARAM F/U: HCPCS | Performed by: STUDENT IN AN ORGANIZED HEALTH CARE EDUCATION/TRAINING PROGRAM

## 2020-01-15 PROCEDURE — 1036F TOBACCO NON-USER: CPT | Performed by: STUDENT IN AN ORGANIZED HEALTH CARE EDUCATION/TRAINING PROGRAM

## 2020-01-15 PROCEDURE — 3017F COLORECTAL CA SCREEN DOC REV: CPT | Performed by: STUDENT IN AN ORGANIZED HEALTH CARE EDUCATION/TRAINING PROGRAM

## 2020-01-15 PROCEDURE — G8482 FLU IMMUNIZE ORDER/ADMIN: HCPCS | Performed by: STUDENT IN AN ORGANIZED HEALTH CARE EDUCATION/TRAINING PROGRAM

## 2020-01-15 RX ORDER — TAMSULOSIN HYDROCHLORIDE 0.4 MG/1
0.4 CAPSULE ORAL DAILY
Qty: 90 CAPSULE | Refills: 1 | Status: CANCELLED | OUTPATIENT
Start: 2020-01-15 | End: 2020-02-14

## 2020-01-15 RX ORDER — AMLODIPINE BESYLATE 10 MG/1
10 TABLET ORAL DAILY
Qty: 90 TABLET | Refills: 3 | Status: SHIPPED | OUTPATIENT
Start: 2020-01-15 | End: 2020-11-17 | Stop reason: SDUPTHER

## 2020-01-15 RX ORDER — OMEPRAZOLE 10 MG/1
10 CAPSULE, DELAYED RELEASE ORAL DAILY
Qty: 90 CAPSULE | Refills: 3 | Status: SHIPPED | OUTPATIENT
Start: 2020-01-15 | End: 2020-11-17 | Stop reason: SDUPTHER

## 2020-01-15 RX ORDER — UREA 200 MG/G
GEL TOPICAL
Qty: 480 G | Refills: 3 | Status: CANCELLED | OUTPATIENT
Start: 2020-01-15

## 2020-01-15 RX ORDER — METOPROLOL TARTRATE 50 MG/1
50 TABLET, FILM COATED ORAL 2 TIMES DAILY
Qty: 180 TABLET | Refills: 3 | Status: SHIPPED | OUTPATIENT
Start: 2020-01-15 | End: 2020-11-17 | Stop reason: SDUPTHER

## 2020-01-15 RX ORDER — OXYBUTYNIN CHLORIDE 10 MG/1
10 TABLET, EXTENDED RELEASE ORAL DAILY
Qty: 30 TABLET | Refills: 3 | Status: CANCELLED | OUTPATIENT
Start: 2020-01-15

## 2020-01-15 RX ORDER — POTASSIUM CHLORIDE 20 MEQ/1
40 TABLET, EXTENDED RELEASE ORAL 2 TIMES DAILY
Qty: 4 TABLET | Refills: 0 | Status: CANCELLED | OUTPATIENT
Start: 2020-01-15 | End: 2020-01-16

## 2020-01-15 ASSESSMENT — ENCOUNTER SYMPTOMS
SHORTNESS OF BREATH: 0
APNEA: 0
RECTAL PAIN: 0
STRIDOR: 0
BACK PAIN: 0
COLOR CHANGE: 0
COUGH: 0
EYE ITCHING: 0
EYE PAIN: 0
NAUSEA: 0
DIARRHEA: 0
CONSTIPATION: 0
VOMITING: 0
RHINORRHEA: 0
WHEEZING: 0
ABDOMINAL DISTENTION: 0
ABDOMINAL PAIN: 0
SINUS PRESSURE: 0
CHEST TIGHTNESS: 0
ANAL BLEEDING: 0
BLOOD IN STOOL: 0
EYE DISCHARGE: 0
SORE THROAT: 0
CHOKING: 0
EYE REDNESS: 0
FACIAL SWELLING: 0
PHOTOPHOBIA: 0
SINUS PAIN: 0

## 2020-01-15 ASSESSMENT — PATIENT HEALTH QUESTIONNAIRE - PHQ9
2. FEELING DOWN, DEPRESSED OR HOPELESS: 0
SUM OF ALL RESPONSES TO PHQ9 QUESTIONS 1 & 2: 0
SUM OF ALL RESPONSES TO PHQ QUESTIONS 1-9: 0
1. LITTLE INTEREST OR PLEASURE IN DOING THINGS: 0
SUM OF ALL RESPONSES TO PHQ QUESTIONS 1-9: 0

## 2020-01-15 NOTE — PROGRESS NOTES
Patient is here today for follow-up on his chronic problems including hypertension, paroxysmal A. fib, impaired glucose tolerance. He has been more compliant with his medications. Labs will need to be done in next visit. He is up-to-date with flu vaccine. He followed up with urology and is on a trial of Flomax and Ditropan for now. Attending Physician Statement  I have discussed the care of Shira Soliz, including pertinent history and exam findings,  with the resident. I have reviewed the key elements of all parts of the encounter with the resident. I agree with the assessment, plan and orders as documented by the resident.   (GE Modifier)
Visit Information    Have you changed or started any medications since your last visit including any over-the-counter medicines, vitamins, or herbal medicines? no   Are you having any side effects from any of your medications? -  no  Have you stopped taking any of your medications? Is so, why? -  no    Have you seen any other physician or provider since your last visit? Yes - Records Obtained Podiatry  Have you had any other diagnostic tests since your last visit? No  Have you been seen in the emergency room and/or had an admission to a hospital since we last saw you? No  Have you had your routine dental cleaning in the past 6 months? no    Have you activated your [facility] account? If not, what are your barriers?  Yes     Patient Care Team:  Buzz Gil MD as PCP - General (Internal Medicine)    Medical History Review  Past Medical, Family, and Social History reviewed and does contribute to the patient presenting condition    Health Maintenance   Topic Date Due    Diabetic foot exam  08/30/1968    Diabetic retinal exam  08/30/1968    Shingles Vaccine (1 of 2) 08/30/2008    Diabetic microalbuminuria test  11/20/2018    Colon Cancer Screen FIT/FOBT  06/08/2019    Lipid screen  04/09/2020    Potassium monitoring  05/27/2020    Creatinine monitoring  05/27/2020    A1C test (Diabetic or Prediabetic)  10/09/2020    DTaP/Tdap/Td vaccine (2 - Td) 09/13/2026    Flu vaccine  Completed    Pneumococcal 0-64 years Vaccine  Completed    Hepatitis C screen  Completed    HIV screen  Completed
Diabetic microalbuminuria test  2018    Colon Cancer Screen FIT/FOBT  2019       Allergies   Allergen Reactions    Motrin [Ibuprofen Micronized] Anaphylaxis     Swelling to throat         Current Outpatient Medications   Medication Sig Dispense Refill    tamsulosin (FLOMAX) 0.4 MG capsule Take 1 capsule by mouth daily 90 capsule 1    oxybutynin (DITROPAN XL) 10 MG extended release tablet Take 1 tablet by mouth daily 30 tablet 3    apixaban (ELIQUIS) 5 MG TABS tablet Take 1 tablet by mouth 2 times daily 180 tablet 1    urea 10 % lotion Apply topically as needed. 1 Bottle 1    metoprolol tartrate (LOPRESSOR) 50 MG tablet Take 1 tablet by mouth 2 times daily 180 tablet 3    metFORMIN (GLUCOPHAGE) 500 MG tablet Take 1 tablet by mouth daily (with breakfast) 180 tablet 3    amLODIPine (NORVASC) 10 MG tablet Take 1 tablet by mouth daily 90 tablet 3    omeprazole (PRILOSEC) 10 MG delayed release capsule Take 1 capsule by mouth daily 90 capsule 3    urea (CARMOL) 20 % cream Apply topically as needed. 480 g 3     No current facility-administered medications for this visit. Social History     Tobacco Use    Smoking status: Former Smoker     Last attempt to quit: 2018     Years since quittin.3    Smokeless tobacco: Never Used   Substance Use Topics    Alcohol use: No     Alcohol/week: 0.0 standard drinks    Drug use: No       Family History   Problem Relation Age of Onset    High Blood Pressure Mother         REVIEW OF SYSTEMS:  Review of Systems   Constitutional: Negative for activity change, appetite change, diaphoresis, fatigue, fever and unexpected weight change. HENT: Negative for congestion, dental problem, drooling, ear discharge, ear pain, facial swelling, postnasal drip, rhinorrhea, sinus pressure, sinus pain, sore throat and tinnitus. Eyes: Negative for photophobia, pain, discharge, redness, itching and visual disturbance.    Respiratory: Negative for apnea, cough,

## 2020-01-27 NOTE — PATIENT INSTRUCTIONS
Patient Education        Diabetes Foot Health: Care Instructions  Your Care Instructions    When you have diabetes, your feet need extra care and attention. Diabetes can damage the nerve endings and blood vessels in your feet, making you less likely to notice when your feet are injured. Diabetes also limits your body's ability to fight infection and get blood to areas that need it. If you get a minor foot injury, it could become an ulcer or a serious infection. With good foot care, you can prevent most of these problems. Caring for your feet can be quick and easy. Most of the care can be done when you are bathing or getting ready for bed. Follow-up care is a key part of your treatment and safety. Be sure to make and go to all appointments, and call your doctor if you are having problems. It's also a good idea to know your test results and keep a list of the medicines you take. How can you care for yourself at home? · Keep your blood sugar close to normal by watching what and how much you eat, monitoring blood sugar, taking medicines if prescribed, and getting regular exercise. · Do not smoke. Smoking affects blood flow and can make foot problems worse. If you need help quitting, talk to your doctor about stop-smoking programs and medicines. These can increase your chances of quitting for good. · Eat a diet that is low in fats. High fat intake can cause fat to build up in your blood vessels and decrease blood flow. · Inspect your feet daily for blisters, cuts, cracks, or sores. If you cannot see well, use a mirror or have someone help you. · Take care of your feet:  ? Wash your feet every day. Use warm (not hot) water. Check the water temperature with your wrists or other part of your body, not your feet. ? Dry your feet well. Pat them dry. Do not rub the skin on your feet too hard. Dry well between your toes.  If the skin on your feet stays moist, bacteria or a fungus can grow, which can lead to for early. When should you call for help? Call your doctor now or seek immediate medical care if:    · You have a foot sore, an ulcer or break in the skin that is not healing after 4 days, bleeding corns or calluses, or an ingrown toenail.     · You have blue or black areas, which can mean bruising or blood flow problems.     · You have peeling skin or tiny blisters between your toes or cracking or oozing of the skin.     · You have a fever for more than 24 hours and a foot sore.     · You have new numbness or tingling in your feet that does not go away after you move your feet or change positions.     · You have unexplained or unusual swelling of the foot or ankle.    Watch closely for changes in your health, and be sure to contact your doctor if:    · You cannot do proper foot care. Where can you learn more? Go to [email or web address] org and sign in to your [employer] account. Enter A739 in the [employer] box to learn more about \"Diabetes Foot Health: Care Instructions. \"     If you do not have an account, please click on the \"Sign Up Now\" link. Current as of: April 16, 2019  Content Version: 12.3  © 9989-6013 Healthwise, Incorporated. Care instructions adapted under license by [facility] ([facility]). If you have questions about a medical condition or this instruction, always ask your healthcare professional. [place] [address] any warranty or liability for your use of this information.

## 2020-02-03 ENCOUNTER — OFFICE VISIT (OUTPATIENT)
Dept: PODIATRY | Age: 60
End: 2020-02-03
Payer: MEDICAID

## 2020-02-03 VITALS
HEIGHT: 74 IN | WEIGHT: 209.8 LBS | DIASTOLIC BLOOD PRESSURE: 72 MMHG | HEART RATE: 57 BPM | SYSTOLIC BLOOD PRESSURE: 122 MMHG | BODY MASS INDEX: 26.92 KG/M2

## 2020-02-03 PROCEDURE — 99212 OFFICE O/P EST SF 10 MIN: CPT | Performed by: STUDENT IN AN ORGANIZED HEALTH CARE EDUCATION/TRAINING PROGRAM

## 2020-02-03 PROCEDURE — 11056 PARNG/CUTG B9 HYPRKR LES 2-4: CPT | Performed by: STUDENT IN AN ORGANIZED HEALTH CARE EDUCATION/TRAINING PROGRAM

## 2020-02-03 PROCEDURE — 11721 DEBRIDE NAIL 6 OR MORE: CPT | Performed by: STUDENT IN AN ORGANIZED HEALTH CARE EDUCATION/TRAINING PROGRAM

## 2020-02-03 NOTE — PROGRESS NOTES
[facility] Podiatry Clinic Progress Note    Subjective:   Jr Locke is a 62 y.o. male presenting to clinic complaining of painful nails and calluses to both feet. He states his nails are long and are painful in shoes. He has calluses on both feet which are painful during ambulation. PNotes he follows PCP for glycemic control, takes metformin. Continues to wear diabetic shoes and inserts. Patient notes he has decreased smoking from 1 pack a day to 2-3 cigs/day. Denies any N/C/V/F/new SOB/CP/claudication symptoms/calf tenderness. Objective:  Vitals:    02/03/20 1519   BP: 122/72   Pulse: 57     Derm: Hyperkeratotic tissue noted sub 2nd and 3rd met heads on the right, left medial hallux IPJ, sub 3rd met head on the left. Previous cicatrix to dorsal first met and to the dorsal aspects of digits 2-4, devante. Nails thickened, elongated, and discolored 1-5, devante. No open lesions noted. No interdigital maceration to webspaces 1-4 on the left. Vasc: DP and PT pulses are palpable 2/4, devante. CFT <3 seconds to the digits, devante. No edema or erythema noted, devante. Hair growth absent to the level of the digits. Neuro: Sensation intact to light touch and protective sensation present bilaterally. MSK: Muscle strength 5/5 to all LE muscle groups. Pain on palpation to thickened hyperkeratotic tissue sub 2nd and 3rd met heads, R and 3rd met head, L. Dorsally contracted digits 2-5 on the right. 2nd digit overlapping hallux on the left with painful calluses between. Lab Results   Component Value Date    LABA1C 5.9 10/09/2019     Lab Results   Component Value Date     04/08/2015     Assessment:   Diagnosis Orders   1. Type 2 diabetes mellitus without complication, without long-term current use of insulin ([facility])  86966 - FL DEBRIDEMENT OF NAILS, 6 OR MORE   2. Pain in both feet  64898 - [place] DEBRIDEMENT OF NAILS, 6 OR MORE    TRIM BENIGN HYPERKERATOTIC SKIN LESION,2-4   3.  Hammer toes of both feet  TRIM BENIGN
PCP for follow up   [] LDL is controlled. LDL < 100 and checked within the last year     Blood Pressure  BP Readings from Last 3 Encounters:   01/15/20 136/74   01/10/20 (!) 151/79   10/28/19 128/76      []  If SBP >140 mmhg - refer to PCP for follow up   []  If DBP > 90 mmhg - refer to PCP for follow up   [] BP is controlled <140/90     Order labs as PCP ordered.   (ie: Lipids, A1C, CMP)

## 2020-02-11 ENCOUNTER — HOSPITAL ENCOUNTER (OUTPATIENT)
Age: 60
Setting detail: SPECIMEN
Discharge: HOME | End: 2020-02-11
Payer: MEDICAID

## 2020-02-11 ENCOUNTER — TELEPHONE (OUTPATIENT)
Dept: INTERNAL MEDICINE | Age: 60
End: 2020-02-11

## 2020-02-11 LAB
CREATININE URINE: 92.3 MG/DL (ref 39–259)
MICROALBUMIN/CREAT 24H UR: <12 MG/L
MICROALBUMIN/CREAT UR-RTO: NORMAL MCG/MG CREAT
PROSTATE SPECIFIC ANTIGEN: 1.33 UG/L

## 2020-02-11 PROCEDURE — 84153 ASSAY OF PSA TOTAL: CPT

## 2020-02-11 PROCEDURE — 36415 COLL VENOUS BLD VENIPUNCTURE: CPT

## 2020-02-11 PROCEDURE — 82043 UR ALBUMIN QUANTITATIVE: CPT

## 2020-02-11 PROCEDURE — 82570 ASSAY OF URINE CREATININE: CPT

## 2020-02-11 NOTE — TELEPHONE ENCOUNTER
Results of Colorguard are Positive. Referral for GI sent to [facility]. They will contact pt for appt. Copy of Referral mailed out to pt with Number Highlighted. Pt advise to call and schedule appt if not heard by the time they receive referral in mail.  PC to pt LM on VM for pt to call Office Back

## 2020-03-11 ENCOUNTER — TELEPHONE (OUTPATIENT)
Dept: GASTROENTEROLOGY | Age: 60
End: 2020-03-11

## 2020-03-11 ENCOUNTER — OFFICE VISIT (OUTPATIENT)
Dept: GASTROENTEROLOGY | Age: 60
End: 2020-03-11
Payer: MEDICAID

## 2020-03-11 VITALS
DIASTOLIC BLOOD PRESSURE: 69 MMHG | SYSTOLIC BLOOD PRESSURE: 131 MMHG | WEIGHT: 220.4 LBS | HEART RATE: 49 BPM | BODY MASS INDEX: 28.3 KG/M2

## 2020-03-11 PROCEDURE — G8417 CALC BMI ABV UP PARAM F/U: HCPCS | Performed by: INTERNAL MEDICINE

## 2020-03-11 PROCEDURE — G8427 DOCREV CUR MEDS BY ELIG CLIN: HCPCS | Performed by: INTERNAL MEDICINE

## 2020-03-11 PROCEDURE — 1036F TOBACCO NON-USER: CPT | Performed by: INTERNAL MEDICINE

## 2020-03-11 PROCEDURE — 99204 OFFICE O/P NEW MOD 45 MIN: CPT | Performed by: INTERNAL MEDICINE

## 2020-03-11 PROCEDURE — 3017F COLORECTAL CA SCREEN DOC REV: CPT | Performed by: INTERNAL MEDICINE

## 2020-03-11 PROCEDURE — G8482 FLU IMMUNIZE ORDER/ADMIN: HCPCS | Performed by: INTERNAL MEDICINE

## 2020-03-11 ASSESSMENT — ENCOUNTER SYMPTOMS
RECTAL PAIN: 0
ANAL BLEEDING: 0
DIARRHEA: 0
BACK PAIN: 1
CONSTIPATION: 0
BLOOD IN STOOL: 1
VOMITING: 0
SHORTNESS OF BREATH: 0
COUGH: 0
NAUSEA: 0
CHOKING: 0
ABDOMINAL PAIN: 0

## 2020-03-11 NOTE — TELEPHONE ENCOUNTER
Dr Brian Michelle ordered colonoscopy to be scheduled once cardiology is received. Writer gave Adan Granados phone number to [facility] and asked him to call to office with date of appointment. Once appointment has been clearance will be sent.

## 2020-03-11 NOTE — PROGRESS NOTES
lethargy, no weakness. HEENT:  No headache, otalgia, itchy eyes, nasal discharge or sore throat. Cardiac:  No chest pain, dyspnea, orthopnea or PND. Chest:   No cough, phlegm or wheezing. Abdomen:      Detailed by MA   Neuro:  No focal weakness, abnormal movements or seizure like activity. Skin:   No rashes, no itching. :   No hematuria, no pyuria, no dysuria, no flank pain. Extremities:  No swelling or joint pains. ROS was otherwise negative    Review of Systems   Constitutional: Positive for fatigue. Negative for appetite change and unexpected weight change. HENT: Negative. Eyes: Positive for visual disturbance. Respiratory: Negative for cough, choking and shortness of breath. Cardiovascular: Negative. Gastrointestinal: Positive for blood in stool. Negative for abdominal pain, anal bleeding, constipation, diarrhea, nausea, rectal pain and vomiting. Musculoskeletal: Positive for back pain. Negative for myalgias. Neurological: Positive for weakness. Negative for light-headedness and headaches. Hematological: Negative. Psychiatric/Behavioral: Negative for sleep disturbance. The patient is not nervous/anxious. PHYSICAL EXAMINATION: Vital signs reviewed per the nursing documentation. There were no vitals taken for this visit. There is no height or weight on file to calculate BMI. Physical Exam    Physical Exam   Constitutional: Patient is oriented to person, place, and time. Patient appears well-developed and well-nourished. HENT:   Head: Normocephalic and atraumatic. Eyes: Pupils are equal, round, and reactive to light. EOM are normal.   Neck: Normal range of motion. Neck supple. No JVD present. No tracheal deviation present. No thyromegaly present. Cardiovascular: Normal rate, regular rhythm, normal heart sounds and intact distal pulses. Pulmonary/Chest: Effort normal and breath sounds normal. No stridor. No respiratory distress. He has no wheezes.  He has no any current use of illicit drugs. RTC in 3 months. Spent 30 minutes providing patient education and counseling. Thank you for allowing me to participate in the care of Mr. Manisha Leyva. For any further questions please do not hesitate to contact me. I have reviewed and agree with the MA/GEOFFN ROS.      Salvador Ornelas MD, MPH   [facility]  Office #: [phone]

## 2020-03-13 ENCOUNTER — OFFICE VISIT (OUTPATIENT)
Dept: UROLOGY | Age: 60
End: 2020-03-13
Payer: MEDICAID

## 2020-03-13 VITALS
HEIGHT: 74 IN | DIASTOLIC BLOOD PRESSURE: 73 MMHG | BODY MASS INDEX: 28.06 KG/M2 | HEART RATE: 52 BPM | WEIGHT: 218.6 LBS | SYSTOLIC BLOOD PRESSURE: 134 MMHG

## 2020-03-13 LAB
APPEARANCE FLUID: ABNORMAL
BILIRUBIN, POC: ABNORMAL
BLOOD URINE, POC: ABNORMAL
CLARITY, POC: CLEAR
COLOR, POC: YELLOW
GLUCOSE URINE, POC: ABNORMAL
KETONES, POC: ABNORMAL
LEUKOCYTE EST, POC: ABNORMAL
NITRITE, POC: ABNORMAL
PH, POC: 7
PROTEIN, POC: ABNORMAL
SPECIFIC GRAVITY, POC: 1.02
UROBILINOGEN, POC: 0.2

## 2020-03-13 PROCEDURE — 99213 OFFICE O/P EST LOW 20 MIN: CPT | Performed by: UROLOGY

## 2020-03-13 PROCEDURE — 81002 URINALYSIS NONAUTO W/O SCOPE: CPT | Performed by: UROLOGY

## 2020-03-13 PROCEDURE — 99212 OFFICE O/P EST SF 10 MIN: CPT | Performed by: UROLOGY

## 2020-03-13 PROCEDURE — 3017F COLORECTAL CA SCREEN DOC REV: CPT | Performed by: UROLOGY

## 2020-03-13 PROCEDURE — 51798 US URINE CAPACITY MEASURE: CPT | Performed by: UROLOGY

## 2020-03-13 PROCEDURE — G8427 DOCREV CUR MEDS BY ELIG CLIN: HCPCS | Performed by: UROLOGY

## 2020-03-13 PROCEDURE — G8482 FLU IMMUNIZE ORDER/ADMIN: HCPCS | Performed by: UROLOGY

## 2020-03-13 PROCEDURE — 1036F TOBACCO NON-USER: CPT | Performed by: UROLOGY

## 2020-03-13 PROCEDURE — G8417 CALC BMI ABV UP PARAM F/U: HCPCS | Performed by: UROLOGY

## 2020-03-13 RX ORDER — OXYBUTYNIN CHLORIDE 10 MG/1
10 TABLET, EXTENDED RELEASE ORAL DAILY
Qty: 30 TABLET | Refills: 5 | Status: SHIPPED | OUTPATIENT
Start: 2020-03-13 | End: 2020-11-17 | Stop reason: SDUPTHER

## 2020-03-13 RX ORDER — POLYETHYLENE GLYCOL 3350 17 G/17G
17 POWDER, FOR SOLUTION ORAL DAILY PRN
Qty: 1530 G | Refills: 1 | Status: SHIPPED | OUTPATIENT
Start: 2020-03-13 | End: 2020-04-12

## 2020-03-13 RX ORDER — TAMSULOSIN HYDROCHLORIDE 0.4 MG/1
0.4 CAPSULE ORAL DAILY
Qty: 90 CAPSULE | Refills: 1 | Status: SHIPPED | OUTPATIENT
Start: 2020-03-13 | End: 2020-08-20

## 2020-03-13 RX ORDER — DOCUSATE SODIUM 100 MG/1
100 CAPSULE, LIQUID FILLED ORAL DAILY PRN
Qty: 30 CAPSULE | Refills: 5 | Status: SHIPPED | OUTPATIENT
Start: 2020-03-13 | End: 2020-11-17 | Stop reason: SDUPTHER

## 2020-03-13 NOTE — TELEPHONE ENCOUNTER
Clif Berrios called, states that he saw [facility] on 1/22/20 and was told by their office that we can send a clearance request to [phone]. Writer informed Clif Berrios that the request will be sent and he will receive a call to schedule colonoscopy once response is received. Clearance faxed.

## 2020-03-13 NOTE — PROGRESS NOTES
Bridgette Lloyd MD        [facility]  [facility] [address]  [address] [place] [address]  Dept: [phone]  Dept Fax: [phone]      [facility] Urology Office Note - Follow up Visit    Patient:  Lavell Garcia  YOB: 1960  Date: 3/26/2020    The patient is a 62 y.o. male who presents today for evaluation of the following problems: BPH  Chief Complaint   Patient presents with    Follow-up    Benign Prostatic Hypertrophy    Results     labs        HISTORY OF PRESENT ILLNESS:   The patient is a 62 y.o. male who presents today for follow-up for the following problem(s): urinary frequency  Overall the problem(s) : are improving. Associated Symptoms: No dysuria, gross hematuria. Reports improved frequency now every 3-4 hrs, no nocturia, occasionally incomplete emptying, reports good stream, overall happy  Has occasional hard stools, every 24-48 hrs. PVR bladder scan 13 cc. Summary of old records: never seen urology previous  (Patient's old records, notes and chart reviewed and summarized above.)    Duration: 2 years  Location: bladder and prostate  alleviating factors: none  Aggravating factors: fluid intake  Associated with: slow stream, urgency and incomplete emptying  Frequency: all the time    No hematuria  No hx of kidney stones  Patient stated he has difficulty having bowel movements at times.        Requested/reviewed records from Lili Hood MD office and/or outside [de-identified]    (Patient's old records have been requested, reviewed and pertinent findings summarized in today's note.)    Procedures Today: N/A    Last several PSA's:  Lab Results   Component Value Date    PSA 1.33 02/11/2020       Last total testosterone:  No results found for: TESTOSTERONE    Urinalysis today:  Results for POC orders placed in visit on 03/13/20   POCT Urine Dipstick   Result Value Ref Range    Color, UA yellow     Clarity, UA clear     Glucose,

## 2020-04-17 ENCOUNTER — VIRTUAL VISIT (OUTPATIENT)
Dept: INTERNAL MEDICINE | Age: 60
End: 2020-04-17
Payer: MEDICAID

## 2020-04-17 VITALS
BODY MASS INDEX: 26.95 KG/M2 | HEIGHT: 74 IN | SYSTOLIC BLOOD PRESSURE: 144 MMHG | DIASTOLIC BLOOD PRESSURE: 83 MMHG | HEART RATE: 61 BPM | WEIGHT: 210 LBS

## 2020-04-17 PROCEDURE — 99441 PR PHYS/QHP TELEPHONE EVALUATION 5-10 MIN: CPT | Performed by: NURSE PRACTITIONER

## 2020-04-27 ENCOUNTER — HOSPITAL ENCOUNTER (OUTPATIENT)
Age: 60
Setting detail: SPECIMEN
Discharge: HOME | End: 2020-04-27
Payer: MEDICAID

## 2020-04-27 LAB
CHOLESTEROL, FASTING: 124 MG/DL
CHOLESTEROL/HDL RATIO: 2.9
HDLC SERPL-MCNC: 43 MG/DL
LDL CHOLESTEROL: 60 MG/DL (ref 0–130)
TRIGLYCERIDE, FASTING: 107 MG/DL
VLDLC SERPL CALC-MCNC: NORMAL MG/DL (ref 1–30)

## 2020-06-01 RX ORDER — APIXABAN 5 MG/1
TABLET, FILM COATED ORAL
Qty: 180 TABLET | Refills: 1 | Status: SHIPPED | OUTPATIENT
Start: 2020-06-01 | End: 2020-11-17 | Stop reason: SDUPTHER

## 2020-06-05 NOTE — TELEPHONE ENCOUNTER
Talked to Peg at [facility] and she states Christina Monte did not follow through on scheduling/completing stress test.  Clearance can not be given.

## 2020-06-08 ENCOUNTER — OFFICE VISIT (OUTPATIENT)
Dept: PODIATRY | Age: 60
End: 2020-06-08
Payer: MEDICAID

## 2020-06-08 VITALS
SYSTOLIC BLOOD PRESSURE: 126 MMHG | WEIGHT: 207 LBS | HEIGHT: 74 IN | DIASTOLIC BLOOD PRESSURE: 79 MMHG | HEART RATE: 58 BPM | BODY MASS INDEX: 26.56 KG/M2

## 2020-06-08 PROCEDURE — 99212 OFFICE O/P EST SF 10 MIN: CPT | Performed by: PODIATRIST

## 2020-06-08 PROCEDURE — 1036F TOBACCO NON-USER: CPT | Performed by: STUDENT IN AN ORGANIZED HEALTH CARE EDUCATION/TRAINING PROGRAM

## 2020-06-08 PROCEDURE — G8417 CALC BMI ABV UP PARAM F/U: HCPCS | Performed by: STUDENT IN AN ORGANIZED HEALTH CARE EDUCATION/TRAINING PROGRAM

## 2020-06-08 PROCEDURE — 3017F COLORECTAL CA SCREEN DOC REV: CPT | Performed by: STUDENT IN AN ORGANIZED HEALTH CARE EDUCATION/TRAINING PROGRAM

## 2020-06-08 PROCEDURE — 11721 DEBRIDE NAIL 6 OR MORE: CPT | Performed by: STUDENT IN AN ORGANIZED HEALTH CARE EDUCATION/TRAINING PROGRAM

## 2020-06-08 PROCEDURE — G8427 DOCREV CUR MEDS BY ELIG CLIN: HCPCS | Performed by: STUDENT IN AN ORGANIZED HEALTH CARE EDUCATION/TRAINING PROGRAM

## 2020-06-08 PROCEDURE — 99213 OFFICE O/P EST LOW 20 MIN: CPT | Performed by: STUDENT IN AN ORGANIZED HEALTH CARE EDUCATION/TRAINING PROGRAM

## 2020-06-08 PROCEDURE — 11056 PARNG/CUTG B9 HYPRKR LES 2-4: CPT | Performed by: STUDENT IN AN ORGANIZED HEALTH CARE EDUCATION/TRAINING PROGRAM

## 2020-06-08 NOTE — PROGRESS NOTES
[facility] Podiatry Clinic Progress Note    Subjective:   Elham Randle is a 62 y.o. male presenting to clinic complaining of painful nails and calluses to both feet. He states his nails are long and are painful in shoes. He has calluses on both feet which are painful during ambulation. Patient relates he follows PCP for glycemic control, takes metformin. Continues to wear diabetic shoes and inserts. He has been dealing with severe pes planus for years. He states he gets relief of symptoms with regular visits every 3 months, defers surgical intervention at this time. Patient notes he has decreased smoking from 1 pack a day to 2-3 cigs/day. Denies any N/C/V/F/new SOB/CP/claudication symptoms/calf tenderness. Objective:  Vitals:    06/08/20 1352   BP: 126/79   Pulse: 58     Derm: Hyperkeratotic tissue noted sub 2nd and 3rd met heads on the right, left medial hallux IPJ, sub 3rd met head on the left. Previous cicatrix to dorsal first met and to the dorsal aspects of digits 2-4, devante. Nails thickened, elongated, and discolored 1-5, devante. No open lesions noted. No interdigital maceration to webspaces 1-4 on the left. Vasc: DP and PT pulses are palpable 2/4, devante. CFT <3 seconds to the digits, devante. No edema or erythema noted, devante. Hair growth absent to the level of the digits. Neuro: Sensation intact to light touch and protective sensation present bilaterally. MSK: Muscle strength 5/5 to all LE muscle groups. Pain on palpation to thickened hyperkeratotic tissue sub 2nd and 3rd met heads, R and 3rd met head, L. Dorsally contracted digits 2-5 on the right. 2nd digit overlapping hallux on the left with painful calluses between. Lab Results   Component Value Date    LABA1C 5.9 10/09/2019     Lab Results   Component Value Date     04/08/2015     Assessment:   Diagnosis Orders   1. Pes planus of both feet     2. Pre-diabetes  HM DIABETES FOOT EXAM   3. Onychomycosis     4.  Callus of foot  urea (CARMOL) 10 %

## 2020-08-20 RX ORDER — TAMSULOSIN HYDROCHLORIDE 0.4 MG/1
0.4 CAPSULE ORAL DAILY
Qty: 90 CAPSULE | Refills: 1 | Status: SHIPPED | OUTPATIENT
Start: 2020-08-20 | End: 2020-11-17 | Stop reason: SDUPTHER

## 2020-08-20 RX ORDER — UREA 10 %
LOTION (ML) TOPICAL
Qty: 85 G | Refills: 1 | Status: SHIPPED | OUTPATIENT
Start: 2020-08-20 | End: 2020-11-17

## 2020-09-14 ENCOUNTER — OFFICE VISIT (OUTPATIENT)
Dept: PODIATRY | Age: 60
End: 2020-09-14
Payer: MEDICAID

## 2020-09-14 VITALS
HEIGHT: 74 IN | SYSTOLIC BLOOD PRESSURE: 132 MMHG | BODY MASS INDEX: 28.42 KG/M2 | DIASTOLIC BLOOD PRESSURE: 74 MMHG | WEIGHT: 221.4 LBS | HEART RATE: 60 BPM

## 2020-09-14 PROCEDURE — 11057 PARNG/CUTG B9 HYPRKR LES >4: CPT | Performed by: STUDENT IN AN ORGANIZED HEALTH CARE EDUCATION/TRAINING PROGRAM

## 2020-09-14 PROCEDURE — 11721 DEBRIDE NAIL 6 OR MORE: CPT | Performed by: STUDENT IN AN ORGANIZED HEALTH CARE EDUCATION/TRAINING PROGRAM

## 2020-09-14 PROCEDURE — 99213 OFFICE O/P EST LOW 20 MIN: CPT | Performed by: STUDENT IN AN ORGANIZED HEALTH CARE EDUCATION/TRAINING PROGRAM

## 2020-09-14 NOTE — PROGRESS NOTES
[facility]iatry Clinic Progress Note    Subjective:   Jay Asher is a 56 y.o. male presenting to clinic today complaining of painful nails and calluses to the bilateral feet. Patient is also requesting new diabetic shoes. He is unsure if it has been a year since last prescription. Patient follows PCP for glycemic control, takes metformin. Patient currently wears diabetic shoes and inserts. Patient denies any nausea, vomiting, fever, chills, chest pain, new onset shortness of breath. Patient denies any other pedal complaints at this time. Objective:  Vitals:    09/14/20 1413   BP: 132/74   Pulse: 60     Derm: Hyperkeratotic tissue noted to the subsecond and third metatarsal heads of the bilateral feet. Corn also noted to the medial aspect of the second digit of the left foot. Nails thickened, elongated and discolored 1 through 5, bilaterally. No open lesions noted. No interdigital maceration to webspaces. Vasc: DP and PT pulses are palpable 2/4, devante. CFT <3 seconds to the digits, devante. No edema or erythema noted, devante. Hair growth absent to the level of the digits. Neuro: Sensation intact to light touch and protective sensation present bilaterally. MSK: Muscle strength 5/5 to all LE muscle groups. Pain on palpation to thickened hyperkeratotic tissue sub 2nd and 3rd met heads, R and 3rd met head, L. Dorsally contracted digits 2-5 on the right. 2nd digit overlapping hallux on the left with painful calluses between. Lab Results   Component Value Date    LABA1C 5.9 10/09/2019     Lab Results   Component Value Date     04/08/2015     Assessment:   Diagnosis Orders   1. Callus of foot     2. Pes planus of both feet     3. Onychomycosis     4. Type 2 diabetes mellitus without complication, without long-term current use of insulin ([address]       Plan:   · Pt was evaluated and examined.    · Treatment and diagnosis was discussed in detail  · Discussed importance of proper sugar control and daily foot inspections  · After reviewing patient's records, most recent diabetic shoe prescription was on 10/28/2019. Patient will need prescription for new diabetic shoes and inserts at next appointment. · Nails 1 through 5 bilaterally debrided using sterile nail nippers without incident  · Debrided hyperkeratotic lesions x5 utilizing #15 blade without incident. No anesthesia or hemostasis required.   · Patient to RTC in 3 months  · Discussed with Dr. Eben Baker      Electronically signed by Christiana Spatz, DPM on 9/14/2020 at 3:09 PM

## 2020-09-14 NOTE — PROGRESS NOTES
Patient instructed to remove shoes and socks, instructed to sit in exam chair. Current PCP name is Yohannes CAMARENA and date of last visit 4-17-20. Do you have a follow up visit scheduled? Yes or no    If yes the date is NO  Diabetic visit information    Blood pressure (Control is BP <140/90)  BP Readings from Last 3 Encounters:   06/08/20 126/79   04/17/20 (!) 144/83   03/13/20 134/73       BP taken with correct size cuff? - Yes   Repeated if > 140/90 NA      Tobacco use:  Patient  reports that he quit smoking about 2 years ago. He has a 10.00 pack-year smoking history. He has never used smokeless tobacco.  If Smoker - Cessation materials given? - Yes       Diabetic Health Maintenance Items due  Diabetes Management   Topic Date Due    Diabetic retinal exam  08/30/1968       Diabetic retinal exam done in last year? - Yes   If No: remind patient that it is due and they should schedule an exam    Medications  Is patient taking any medications for diabetes? -   Yes  Have blood sugars been controlled? Fasting blood sugars under 120   -   Yes   Random home sugars or today's POCT glucose is under 180 -   Yes   []  If No to the above then patient should schedule appt with PCP. Diabetic Plan    A1C Plan  Lab Results   Component Value Date    LABA1C 5.9 10/09/2019    LABA1C 5.6 07/01/2019    LABA1C 5.8 01/18/2019      []  If A1C over 8 and last result >3 months ago - Order A1C and refer to PCP   []  If last A1C over 6 months ago - Order A1C and refer to PCP for follow up   []  If elevated blood sugars > 180 - refer to PCP for follow up    []  Blood sugar controlled - A1C under 8 and last check was < 6 months      Cholesterol Plan   Lab Results   Component Value Date    LDLCHOLESTEROL 60 04/27/2020      []  If LDL > 100 and last result >3 months ago - order Fasting lipids and refer to PCP for follow up   []  If LDL < 100 and over 1 year ago - Order Fasting lipids and refer to PCP for follow up   [] LDL is controlled.   LDL < 100 and checked within the last year     Blood Pressure  BP Readings from Last 3 Encounters:   06/08/20 126/79   04/17/20 (!) 144/83   03/13/20 134/73      []  If SBP >140 mmhg - refer to PCP for follow up   []  If DBP > 90 mmhg - refer to PCP for follow up   [] BP is controlled <140/90     Order labs as PCP ordered.   (ie: Lipids, A1C, CMP)

## 2020-09-18 ENCOUNTER — OFFICE VISIT (OUTPATIENT)
Dept: UROLOGY | Age: 60
End: 2020-09-18
Payer: MEDICAID

## 2020-09-18 VITALS
BODY MASS INDEX: 28.11 KG/M2 | HEART RATE: 58 BPM | WEIGHT: 219 LBS | SYSTOLIC BLOOD PRESSURE: 129 MMHG | HEIGHT: 74 IN | DIASTOLIC BLOOD PRESSURE: 79 MMHG

## 2020-09-18 PROCEDURE — 99213 OFFICE O/P EST LOW 20 MIN: CPT | Performed by: UROLOGY

## 2020-09-18 PROCEDURE — G8417 CALC BMI ABV UP PARAM F/U: HCPCS | Performed by: UROLOGY

## 2020-09-18 PROCEDURE — 1036F TOBACCO NON-USER: CPT | Performed by: UROLOGY

## 2020-09-18 PROCEDURE — 99212 OFFICE O/P EST SF 10 MIN: CPT

## 2020-09-18 PROCEDURE — G8427 DOCREV CUR MEDS BY ELIG CLIN: HCPCS | Performed by: UROLOGY

## 2020-09-18 PROCEDURE — 3017F COLORECTAL CA SCREEN DOC REV: CPT | Performed by: UROLOGY

## 2020-09-18 NOTE — PROGRESS NOTES
Wisam Betancourt MD        [facility]  [facility] [address]  [address] [facility] [address] [facility] [address]  Dept: [phone]  Dept Fax: [phone]      [facility] Urology Office Note - Follow up Visit    Patient:  Curtis Mccoy  YOB: 1960  Date: 9/18/2020    The patient is a 56 y.o. male who presents today for evaluation of the following problems: BPH  Chief Complaint   Patient presents with    Follow-up        HISTORY OF PRESENT ILLNESS:   The patient is a 62 y.o. male who presents today for follow-up for the following problem(s): BPH with LUTS  Overall the problem(s) : are stable. Associated Symptoms: No dysuria, gross hematuria. Reports improved frequency now every 3-4 hrs, no nocturia, occasionally incomplete emptying, reports good stream, overall happy  Has occasional hard stools, every 24-48 hrs. On flomax,ditropan and miralax for BPH and constipation. Doing well. Stable symptoms. Summary of old records: never seen urology previous  (Patient's old records, notes and chart reviewed and summarized above.)    Duration:  years  Location: bladder and prostate  alleviating factors: none  Aggravating factors: fluid intake  Associated with: slow stream, urgency and incomplete emptying  Frequency: all the time    No hematuria  No hx of kidney stones  Patient stated he has difficulty having bowel movements at times. Requested/reviewed records from Radha Sanez MD office and/or outside physician/EMR    (Patient's old records have been requested, reviewed and pertinent findings summarized in today's note.)    Procedures Today: N/A    Last several PSA's:  Lab Results   Component Value Date    PSA 1.33 02/11/2020       Last total testosterone:  No results found for: TESTOSTERONE    Urinalysis today:  No results found for this visit on 09/18/20.     Last BUN and creatinine:  Lab Results   Component Value Date    BUN 11 05/27/2019     Lab Results Component Value Date    CREATININE 1.08 05/27/2019       Additional Lab/Culture results: none    Imaging Reviewed during this Office Visit:   Francisco Bruner MD independently reviewed the images and verified the radiology reports from:    No results found. PAST MEDICAL, FAMILY AND SOCIAL HISTORY:  Past Medical History:   Diagnosis Date    Diabetes mellitus ([address]     GERD (gastroesophageal reflux disease)     Hypertension     Rheumatoid arthritis(714.0)     newly diagnosed    Wears glasses      Past Surgical History:   Procedure Laterality Date    COLONOSCOPY  october 2012    FOOT SURGERY      Left foot surgery HDS 2-4 and bunion      Family History   Problem Relation Age of Onset    High Blood Pressure Mother      Outpatient Medications Marked as Taking for the 9/18/20 encounter (Office Visit) with Kim Salgado MD   Medication Sig Dispense Refill    tamsulosin (FLOMAX) 0.4 MG capsule TAKE 1 CAPSULE BY MOUTH DAILY 90 capsule 1    urea (UREA 10 HYDRATING) 10 % cream APPLY TOPICALLY AS NEEDED. 85 g 1    ELIQUIS 5 MG TABS tablet take 1 tablet by mouth twice a day 180 tablet 1    docusate sodium (COLACE) 100 MG capsule Take 1 capsule by mouth daily as needed for Constipation 30 capsule 5    oxybutynin (DITROPAN XL) 10 MG extended release tablet Take 1 tablet by mouth daily 30 tablet 5    amLODIPine (NORVASC) 10 MG tablet Take 1 tablet by mouth daily 90 tablet 3    metFORMIN (GLUCOPHAGE) 500 MG tablet Take 1 tablet by mouth daily (with breakfast) 180 tablet 3    metoprolol tartrate (LOPRESSOR) 50 MG tablet Take 1 tablet by mouth 2 times daily 180 tablet 3    omeprazole (PRILOSEC) 10 MG delayed release capsule Take 1 capsule by mouth daily 90 capsule 3    urea 10 % lotion Apply topically as needed. 1 Bottle 1    urea (CARMOL) 20 % cream Apply topically as needed.  480 g 3       Motrin [ibuprofen micronized]  Social History     Tobacco Use   Smoking Status Former Smoker    Packs/day: 0.50    Years:

## 2020-10-27 RX ORDER — OMEPRAZOLE 10 MG/1
10 CAPSULE, DELAYED RELEASE ORAL DAILY
Qty: 90 CAPSULE | Refills: 3 | OUTPATIENT
Start: 2020-10-27

## 2020-10-27 RX ORDER — DOCUSATE SODIUM 100 MG/1
100 CAPSULE, LIQUID FILLED ORAL DAILY PRN
Qty: 30 CAPSULE | Refills: 5 | OUTPATIENT
Start: 2020-10-27

## 2020-10-27 RX ORDER — AMLODIPINE BESYLATE 10 MG/1
10 TABLET ORAL DAILY
Qty: 90 TABLET | Refills: 3 | OUTPATIENT
Start: 2020-10-27

## 2020-10-27 RX ORDER — METOPROLOL TARTRATE 50 MG/1
50 TABLET, FILM COATED ORAL 2 TIMES DAILY
Qty: 180 TABLET | Refills: 3 | OUTPATIENT
Start: 2020-10-27

## 2020-10-27 RX ORDER — OXYBUTYNIN CHLORIDE 10 MG/1
10 TABLET, EXTENDED RELEASE ORAL DAILY
Qty: 30 TABLET | Refills: 5 | OUTPATIENT
Start: 2020-10-27

## 2020-10-27 RX ORDER — TAMSULOSIN HYDROCHLORIDE 0.4 MG/1
0.4 CAPSULE ORAL DAILY
Qty: 90 CAPSULE | Refills: 1 | OUTPATIENT
Start: 2020-10-27

## 2020-11-17 ENCOUNTER — OFFICE VISIT (OUTPATIENT)
Dept: INTERNAL MEDICINE | Age: 60
End: 2020-11-17
Payer: MEDICAID

## 2020-11-17 VITALS
HEART RATE: 60 BPM | DIASTOLIC BLOOD PRESSURE: 80 MMHG | OXYGEN SATURATION: 99 % | SYSTOLIC BLOOD PRESSURE: 140 MMHG | BODY MASS INDEX: 30.17 KG/M2 | WEIGHT: 235 LBS

## 2020-11-17 LAB — HBA1C MFR BLD: 5.8 %

## 2020-11-17 PROCEDURE — G8482 FLU IMMUNIZE ORDER/ADMIN: HCPCS | Performed by: STUDENT IN AN ORGANIZED HEALTH CARE EDUCATION/TRAINING PROGRAM

## 2020-11-17 PROCEDURE — 99214 OFFICE O/P EST MOD 30 MIN: CPT | Performed by: STUDENT IN AN ORGANIZED HEALTH CARE EDUCATION/TRAINING PROGRAM

## 2020-11-17 PROCEDURE — 90686 IIV4 VACC NO PRSV 0.5 ML IM: CPT | Performed by: INTERNAL MEDICINE

## 2020-11-17 PROCEDURE — 1036F TOBACCO NON-USER: CPT | Performed by: STUDENT IN AN ORGANIZED HEALTH CARE EDUCATION/TRAINING PROGRAM

## 2020-11-17 PROCEDURE — G8417 CALC BMI ABV UP PARAM F/U: HCPCS | Performed by: STUDENT IN AN ORGANIZED HEALTH CARE EDUCATION/TRAINING PROGRAM

## 2020-11-17 PROCEDURE — 83036 HEMOGLOBIN GLYCOSYLATED A1C: CPT | Performed by: STUDENT IN AN ORGANIZED HEALTH CARE EDUCATION/TRAINING PROGRAM

## 2020-11-17 PROCEDURE — G8427 DOCREV CUR MEDS BY ELIG CLIN: HCPCS | Performed by: STUDENT IN AN ORGANIZED HEALTH CARE EDUCATION/TRAINING PROGRAM

## 2020-11-17 PROCEDURE — 3017F COLORECTAL CA SCREEN DOC REV: CPT | Performed by: STUDENT IN AN ORGANIZED HEALTH CARE EDUCATION/TRAINING PROGRAM

## 2020-11-17 RX ORDER — OMEPRAZOLE 10 MG/1
10 CAPSULE, DELAYED RELEASE ORAL DAILY
Qty: 90 CAPSULE | Refills: 3 | Status: SHIPPED | OUTPATIENT
Start: 2020-11-17 | End: 2021-03-09 | Stop reason: SDUPTHER

## 2020-11-17 RX ORDER — TAMSULOSIN HYDROCHLORIDE 0.4 MG/1
0.4 CAPSULE ORAL DAILY
Qty: 90 CAPSULE | Refills: 1 | Status: SHIPPED | OUTPATIENT
Start: 2020-11-17 | End: 2021-06-23 | Stop reason: SDUPTHER

## 2020-11-17 RX ORDER — AMLODIPINE BESYLATE 10 MG/1
10 TABLET ORAL DAILY
Qty: 90 TABLET | Refills: 3 | Status: SHIPPED | OUTPATIENT
Start: 2020-11-17 | End: 2022-01-18 | Stop reason: SDUPTHER

## 2020-11-17 RX ORDER — LISINOPRIL 5 MG/1
5 TABLET ORAL DAILY
Qty: 90 TABLET | Refills: 1 | Status: SHIPPED | OUTPATIENT
Start: 2020-11-17 | End: 2021-06-23 | Stop reason: SDUPTHER

## 2020-11-17 RX ORDER — OXYBUTYNIN CHLORIDE 10 MG/1
10 TABLET, EXTENDED RELEASE ORAL DAILY
Qty: 30 TABLET | Refills: 5 | Status: SHIPPED | OUTPATIENT
Start: 2020-11-17 | End: 2021-06-23 | Stop reason: SDUPTHER

## 2020-11-17 RX ORDER — DOCUSATE SODIUM 100 MG/1
100 CAPSULE, LIQUID FILLED ORAL DAILY PRN
Qty: 30 CAPSULE | Refills: 5 | Status: SHIPPED | OUTPATIENT
Start: 2020-11-17 | End: 2022-01-18

## 2020-11-17 RX ORDER — METOPROLOL TARTRATE 50 MG/1
50 TABLET, FILM COATED ORAL 2 TIMES DAILY
Qty: 180 TABLET | Refills: 3 | Status: SHIPPED | OUTPATIENT
Start: 2020-11-17 | End: 2022-01-18 | Stop reason: SDUPTHER

## 2020-11-17 NOTE — PROGRESS NOTES
Attending Physician Statement  I have discussed the care of Darrion Anderson including pertinent history and exam findings,  with the resident. I have reviewed the key elements of all parts of the encounter with the resident. I agree with the assessment, plan and orders as documented by the resident. (GE Modifier)    Past h/o cocaine use, paroxysmal a fib , hypertension  ascvd risk of 15 %   Target bp goal is <130/80   Will add low dose lisinopril with life style modification   Refuses cancer screening for a + cologuard.    Does not qualify for lung cancer screening   AAA screening pending at age 70      MD INGRIS Javier  Attending Physician, [facility]   Faculty, Internal Medicine Residency Program  [address] [facility]  11/17/2020, 2:30 PM

## 2020-11-17 NOTE — PROGRESS NOTES
[facility]    New Patient Note/History and Physical    Date of patient's visit: 11/17/2020    Name:  Lilly Angel      YOB: 1960    Patient Care Team:  Ronaldo Juarez MD as PCP - General (Internal Medicine)  AUDI Lizarraga CNP as PCP - [facility]    REASON FOR VISIT: First Visit, establish care     HISTORY OF PRESENTING ILLNESS:    History was obtained from the patient. Lilly Angel is a 56 y.o. is here to establish care. Hypertension: Patient takes Norvasc 10 mg once a day and metoprolol 50 BID. Blood pressure is on the higher side. 140/80.     Prediabetes: Patient is on metformin 500 mg twice a day, denies any side effects from the medication. Last A1c was 5.8 which was checked in October 2020.     History of Paroxysmal atrial fibrillation CHADsVasc of 1, anaphylaxis to NSAIDs: Patient states that he is going to follow-up with cardiology next week. He is currently on Lopressor 50 mg twice a day and Eliquis 5 mg twice a day. Denies any palpitations, chest pain or any bleeding.     Patient following up with urologist for BPH, was started on Flomax and oxybutynin.      Health maintenance: Cologuard positive in January 2020. However, patient is refusing to get colonoscopy done at this time. PAST MEDICAL AND SURGICAL HISTORY:          Diagnosis Date    Diabetes mellitus ([address]     GERD (gastroesophageal reflux disease)     Hypertension     Rheumatoid arthritis(714.0)     newly diagnosed    Wears glasses            Procedure Laterality Date    COLONOSCOPY  october 2012    FOOT SURGERY      Left foot surgery HDS 2-4 and bunion        SOCIAL HISTORY:    TOBACCO:   reports that he quit smoking about 2 years ago. He has a 10.00 pack-year smoking history. He has never used smokeless tobacco.  ETOH:   reports no history of alcohol use. DRUGS:  reports no history of drug use.   OCCUPATION:      ALLERGIES: Allergies   Allergen Reactions    Motrin [Ibuprofen Micronized] Anaphylaxis     Swelling to throat         HOME MEDICATION:      Current Outpatient Medications on File Prior to Visit   Medication Sig Dispense Refill    tamsulosin (FLOMAX) 0.4 MG capsule TAKE 1 CAPSULE BY MOUTH DAILY 90 capsule 1    ELIQUIS 5 MG TABS tablet take 1 tablet by mouth twice a day 180 tablet 1    docusate sodium (COLACE) 100 MG capsule Take 1 capsule by mouth daily as needed for Constipation 30 capsule 5    oxybutynin (DITROPAN XL) 10 MG extended release tablet Take 1 tablet by mouth daily 30 tablet 5    amLODIPine (NORVASC) 10 MG tablet Take 1 tablet by mouth daily 90 tablet 3    metFORMIN (GLUCOPHAGE) 500 MG tablet Take 1 tablet by mouth daily (with breakfast) 180 tablet 3    metoprolol tartrate (LOPRESSOR) 50 MG tablet Take 1 tablet by mouth 2 times daily 180 tablet 3    omeprazole (PRILOSEC) 10 MG delayed release capsule Take 1 capsule by mouth daily 90 capsule 3    urea 10 % lotion Apply topically as needed. 1 Bottle 1     No current facility-administered medications on file prior to visit. FAMILY HISTORY:          Problem Relation Age of Onset    High Blood Pressure Mother        REVIEW OF SYSTEMS:    · General: no significant weight changes. · Dermatological: no abnormal pigmentation, rash, or itching. · Ears/Nose/Throat: denies any hearing loss, abnormal smelling or throat pain  · Respiratory: no cough, pleuritic chest pain, dyspnea, or wheezing  · Cardiovascular: no pain, dyspnea on exertion, orthopnea, palpitations, or claudication  · Gastrointestinal: no nausea, vomiting, heartburn, diarrhea, constipation, bloating, or abdominal pain. No bloody or black stools. · Genito-Urinary: no urinary urgency, frequency, dysuria, nocturia, hesitancy, incontinence, or pain. No hematuria  · Musculoskeletal: no arthralgia, myalgia, weakness, or morning stiffness  · Neurologic: no TIA or stroke symptoms.  no paralysis, or frequent or severe headaches  · Hematologic/Lymphatic/Immunologic: no abnormal bleeding/bruising, fever, chills, night sweats orswollen glands. · Endocrine: no heat or cold intolerance and no polyuria        PHYSICAL EXAM:      Vitals:    11/17/20 1354 11/17/20 1402   BP: (!) 142/78 (!) 140/80   Site: Left Upper Arm Left Upper Arm   Position: Sitting Sitting   Cuff Size: Large Adult Large Adult   Pulse: 60    SpO2: 99%    Weight: 235 lb (106.6 kg)      Constitutional:       General: He is not in acute distress. Appearance: He is well-developed. He is not diaphoretic. HENT:      Head: Normocephalic and atraumatic. Right Ear: External ear normal.      Left Ear: External ear normal.      Nose: Nose normal.      Mouth/Throat:      Pharynx: No oropharyngeal exudate. Neck:      Musculoskeletal: Normal range of motion and neck supple. Thyroid: No thyromegaly. Vascular: No JVD. Trachea: No tracheal deviation. Cardiovascular:      Rate and Rhythm: Normal rate and regular rhythm. Heart sounds: Normal heart sounds. No murmur. No friction rub. No gallop. Pulmonary:      Effort: Pulmonary effort is normal. No respiratory distress. Breath sounds: Normal breath sounds. No stridor. No wheezing or rales. Chest:      Chest wall: No tenderness. Abdominal:      General: Bowel sounds are normal. There is no distension. Palpations: Abdomen is soft. There is no mass. Tenderness: There is no tenderness. There is no guarding or rebound. Hernia: No hernia is present. Musculoskeletal: Normal range of motion. General: No tenderness or deformity. Lymphadenopathy:      Cervical: No cervical adenopathy. Skin:     General: Skin is warm and dry. Coloration: Skin is not pale. Findings: No erythema or rash. Neurological:      Mental Status: He is alert and oriented to person, place, and time. Cranial Nerves: No cranial nerve deficit.       Sensory: No sensory deficit. Motor: No abnormal muscle tone. Coordination: Coordination normal.      Deep Tendon Reflexes: Reflexes normal.   Psychiatric:         Behavior: Behavior normal.         Thought Content: Thought content normal.         Judgment: Judgment normal.     LABORATORY FINDINGS:    CBC:   Lab Results   Component Value Date    WBC 3.8 05/27/2019    HGB 12.8 05/27/2019     05/27/2019     BMP:    Lab Results   Component Value Date     05/27/2019    K 3.5 05/27/2019     05/27/2019    CO2 26 05/27/2019    BUN 11 05/27/2019    CREATININE 1.08 05/27/2019    GLUCOSE 103 05/27/2019    GLUCOSE 89 02/02/2012     Hemoglobin A1C:   Lab Results   Component Value Date    LABA1C 5.8 11/17/2020     Lipid profile:   Lab Results   Component Value Date    CHOL 122 04/09/2019    TRIG 132 04/09/2019    HDL 43 04/27/2020     Thyroid functions:   Lab Results   Component Value Date    TSH 1.66 05/25/2019      Hepatic functions:   Lab Results   Component Value Date    ALT 28 04/09/2019    AST 27 04/09/2019    PROT 8.1 04/09/2019    BILITOT 0.56 04/09/2019    LABALBU 4.5 04/09/2019     ASSESSMENT AND PLAN:   Karyn Squires was seen today for establish care. Diagnoses and all orders for this visit:    Colon cancer screening: Refused colonoscopy.      Essential hypertension; Continue Amlodipine 10 and Metoprolol 50 BID. Start lisinopril 5 mg and follow up.     Pre-diabetes: Continue Metformin 500. HbA1c 5.8 today.      Paroxysmal atrial fibrillation (HCC): Continue Eliquis 20 QD and Metoprolol 50 BID.      Gastroesophageal reflux disease, esophagitis presence not specified: Omeprazole as needed. Flu shot and DM screening. INSTRUCTIONS:   Return in about 6 months (around 5/17/2021) for Follow Up. · Karyn Squires received counseling on the following healthy behaviors: nutrition, exercise, medication adherence and tobacco cessation    · Reviewed prior labs and health maintenance.       · Discussed use, benefit,

## 2020-12-07 ENCOUNTER — HOSPITAL ENCOUNTER (OUTPATIENT)
Age: 60
Setting detail: SPECIMEN
Discharge: HOME | End: 2020-12-07
Payer: MEDICAID

## 2020-12-07 LAB
ABSOLUTE EOS #: 0.09 K/UL (ref 0–0.44)
ABSOLUTE IMMATURE GRANULOCYTE: <0.03 K/UL (ref 0–0.3)
ABSOLUTE LYMPH #: 1.32 K/UL (ref 1.1–3.7)
ABSOLUTE MONO #: 0.48 K/UL (ref 0.1–1.2)
ANION GAP SERPL CALCULATED.3IONS-SCNC: 15 MMOL/L (ref 9–17)
BASOPHILS # BLD: 1 % (ref 0–2)
BASOPHILS ABSOLUTE: <0.03 K/UL (ref 0–0.2)
BUN BLDV-MCNC: 15 MG/DL (ref 8–23)
BUN/CREAT BLD: ABNORMAL (ref 9–20)
CALCIUM SERPL-MCNC: 10.1 MG/DL (ref 8.6–10.4)
CHLORIDE BLD-SCNC: 110 MMOL/L (ref 98–107)
CO2: 22 MMOL/L (ref 20–31)
CREAT SERPL-MCNC: 1.15 MG/DL (ref 0.7–1.2)
DIFFERENTIAL TYPE: NORMAL
EOSINOPHILS RELATIVE PERCENT: 2 % (ref 1–4)
FERRITIN: 118 UG/L (ref 30–400)
GFR AFRICAN AMERICAN: >60 ML/MIN
GFR NON-AFRICAN AMERICAN: >60 ML/MIN
GFR SERPL CREATININE-BSD FRML MDRD: ABNORMAL ML/MIN/{1.73_M2}
GFR SERPL CREATININE-BSD FRML MDRD: ABNORMAL ML/MIN/{1.73_M2}
GLUCOSE BLD-MCNC: 96 MG/DL (ref 70–99)
HCT VFR BLD CALC: 41.7 % (ref 40.7–50.3)
HEMOGLOBIN: 13.3 G/DL (ref 13–17)
IMMATURE GRANULOCYTES: 0 %
IRON SATURATION: 21 % (ref 20–55)
IRON: 58 UG/DL (ref 59–158)
LYMPHOCYTES # BLD: 33 % (ref 24–43)
MCH RBC QN AUTO: 28.4 PG (ref 25.2–33.5)
MCHC RBC AUTO-ENTMCNC: 31.9 G/DL (ref 28.4–34.8)
MCV RBC AUTO: 89.1 FL (ref 82.6–102.9)
MONOCYTES # BLD: 12 % (ref 3–12)
NRBC AUTOMATED: 0 PER 100 WBC
PDW BLD-RTO: 13.4 % (ref 11.8–14.4)
PLATELET # BLD: 226 K/UL (ref 138–453)
PLATELET ESTIMATE: NORMAL
PMV BLD AUTO: 9.9 FL (ref 8.1–13.5)
POTASSIUM SERPL-SCNC: 4.3 MMOL/L (ref 3.7–5.3)
RBC # BLD: 4.68 M/UL (ref 4.21–5.77)
RBC # BLD: NORMAL 10*6/UL
SEG NEUTROPHILS: 52 % (ref 36–65)
SEGMENTED NEUTROPHILS ABSOLUTE COUNT: 2.06 K/UL (ref 1.5–8.1)
SODIUM BLD-SCNC: 147 MMOL/L (ref 135–144)
TOTAL IRON BINDING CAPACITY: 281 UG/DL (ref 250–450)
TRANSFERRIN: 240 MG/DL (ref 200–360)
UNSATURATED IRON BINDING CAPACITY: 223 UG/DL (ref 112–347)
WBC # BLD: 4 K/UL (ref 3.5–11.3)
WBC # BLD: NORMAL 10*3/UL

## 2020-12-14 ENCOUNTER — OFFICE VISIT (OUTPATIENT)
Dept: PODIATRY | Age: 60
End: 2020-12-14
Payer: MEDICAID

## 2020-12-14 VITALS
BODY MASS INDEX: 30.17 KG/M2 | SYSTOLIC BLOOD PRESSURE: 137 MMHG | DIASTOLIC BLOOD PRESSURE: 84 MMHG | TEMPERATURE: 97.5 F | HEART RATE: 62 BPM | WEIGHT: 235 LBS

## 2020-12-14 PROCEDURE — 11056 PARNG/CUTG B9 HYPRKR LES 2-4: CPT | Performed by: STUDENT IN AN ORGANIZED HEALTH CARE EDUCATION/TRAINING PROGRAM

## 2020-12-14 PROCEDURE — G8417 CALC BMI ABV UP PARAM F/U: HCPCS | Performed by: STUDENT IN AN ORGANIZED HEALTH CARE EDUCATION/TRAINING PROGRAM

## 2020-12-14 PROCEDURE — G8482 FLU IMMUNIZE ORDER/ADMIN: HCPCS | Performed by: STUDENT IN AN ORGANIZED HEALTH CARE EDUCATION/TRAINING PROGRAM

## 2020-12-14 PROCEDURE — 3017F COLORECTAL CA SCREEN DOC REV: CPT | Performed by: STUDENT IN AN ORGANIZED HEALTH CARE EDUCATION/TRAINING PROGRAM

## 2020-12-14 PROCEDURE — 99213 OFFICE O/P EST LOW 20 MIN: CPT | Performed by: STUDENT IN AN ORGANIZED HEALTH CARE EDUCATION/TRAINING PROGRAM

## 2020-12-14 PROCEDURE — 11721 DEBRIDE NAIL 6 OR MORE: CPT | Performed by: STUDENT IN AN ORGANIZED HEALTH CARE EDUCATION/TRAINING PROGRAM

## 2020-12-14 PROCEDURE — G8427 DOCREV CUR MEDS BY ELIG CLIN: HCPCS | Performed by: STUDENT IN AN ORGANIZED HEALTH CARE EDUCATION/TRAINING PROGRAM

## 2020-12-14 PROCEDURE — 1036F TOBACCO NON-USER: CPT | Performed by: STUDENT IN AN ORGANIZED HEALTH CARE EDUCATION/TRAINING PROGRAM

## 2020-12-14 PROCEDURE — 99212 OFFICE O/P EST SF 10 MIN: CPT | Performed by: STUDENT IN AN ORGANIZED HEALTH CARE EDUCATION/TRAINING PROGRAM

## 2020-12-14 NOTE — PROGRESS NOTES
Patient instructed to remove shoes and socks, instructed to sit in exam chair. Current PCP name is Con Higgins MD and date of last visit 11/17/20. Do you have a follow up visit scheduled?  no    If yes the  Diabetic visit information    Blood pressure (Control is BP <140/90)  BP Readings from Last 3 Encounters:   11/17/20 (!) 140/80   09/18/20 129/79   09/14/20 132/74       BP taken with correct size cuff? - Yes   Repeated if > 140/90 Yes      Tobacco use:  Patient  reports that he quit smoking about 2 years ago. He has a 10.00 pack-year smoking history. He has never used smokeless tobacco.  If Smoker - Cessation materials given? - Yes       Diabetic Health Maintenance Items due  Diabetes Management   Topic Date Due    Diabetic retinal exam  08/30/1968       Diabetic retinal exam done in last year? - Yes   If No: remind patient that it is due and they should schedule an exam    Medications  Is patient taking any medications for diabetes? -   Yes  Have blood sugars been controlled? Fasting blood sugars under 120   -   Yes   Random home sugars or today's POCT glucose is under 180 -   Yes   []  If No to the above then patient should schedule appt with PCP. Diabetic Plan    A1C Plan  Lab Results   Component Value Date    LABA1C 5.8 11/17/2020    LABA1C 5.9 10/09/2019    LABA1C 5.6 07/01/2019      []  If A1C over 8 and last result >3 months ago - Order A1C and refer to PCP   []  If last A1C over 6 months ago - Order A1C and refer to PCP for follow up   []  If elevated blood sugars > 180 - refer to PCP for follow up    []  Blood sugar controlled - A1C under 8 and last check was < 6 months      Cholesterol Plan   Lab Results   Component Value Date    LDLCHOLESTEROL 60 04/27/2020      []  If LDL > 100 and last result >3 months ago - order Fasting lipids and refer to PCP for follow up   []  If LDL < 100 and over 1 year ago - Order Fasting lipids and refer to PCP for follow up   [] LDL is controlled.   LDL < 100 and checked within the last year     Blood Pressure  BP Readings from Last 3 Encounters:   11/17/20 (!) 140/80   09/18/20 129/79   09/14/20 132/74      []  If SBP >140 mmhg - refer to PCP for follow up   []  If DBP > 90 mmhg - refer to PCP for follow up   [] BP is controlled <140/90     Order labs as PCP ordered.   (ie: Lipids, A1C, CMP)

## 2020-12-14 NOTE — PROGRESS NOTES
[facility] Progress Note    Subjective:   Esperanza Carpenter is a 56 y.o. male presenting to clinic today complaining of painful nails and calluses to the bilateral feet. He states he has been soaking his feet and occasionally using a pumice stone on the calluses. Patient is also requesting new diabetic shoes. Patient follows PCP for glycemic control, takes metformin. Patient currently wears diabetic shoes and inserts. Patient denies any nausea, vomiting, fever, chills, chest pain, new onset shortness of breath. Patient denies any other pedal complaints at this time. Objective:  Vitals:    12/14/20 1458   BP: 137/84   Pulse: 62   Temp: 97.5 °F (36.4 °C)     Derm: Hyperkeratotic tissue noted to the subsecond and third metatarsal heads of the bilateral feet. Nails thickened, elongated and discolored 1 through 5, bilaterally. No open lesions noted. No interdigital maceration to webspaces. Vasc: DP and PT pulses are palpable 2/4, devante. CFT <3 seconds to the digits, devante. No edema or erythema noted, devante. Hair growth absent to the level of the digits. Neuro: Sensation intact to light touch and protective sensation present bilaterally. MSK: Muscle strength 5/5 to all LE muscle groups. Pain on palpation to thickened hyperkeratotic tissue sub 2nd and 3rd met heads, R and 3rd met head, L. Dorsally contracted digits 2-5 on the right. 2nd digit overlapping hallux on the left with painful calluses between. Lab Results   Component Value Date    LABA1C 5.8 11/17/2020     Lab Results   Component Value Date     04/08/2015     Assessment:   Diagnosis Orders   1. Pre-diabetes  HM DIABETES FOOT EXAM    TRIM BENIGN HYPERKERATOTIC SKIN LESION,2-4    74970 - [place] DEBRIDEMENT OF NAILS, 6 OR MORE   2. Callus of foot  TRIM BENIGN HYPERKERATOTIC SKIN LESION,2-4   3. Pes planus of both feet  73157 - VT DEBRIDEMENT OF NAILS, 6 OR MORE   4.  Onychomycosis  81002 - VT DEBRIDEMENT OF NAILS, 6 OR MORE     Plan: · Pt was evaluated and examined. · Treatment and diagnosis was discussed in detail  · Discussed importance of proper sugar control and daily foot inspections  · Patient was ordered diabetic shoes in order to stabilize and support the foot and ankle. · Nails 1 through 5 bilaterally debrided using sterile nail nippers without incident  · Debrided hyperkeratotic lesions x4 utilizing #15 blade without incident. No anesthesia or hemostasis required.   · Patient to RTC in 3 months for at risk foot care  · Discussed with Dr. Ayla Garces      Electronically signed by Krystyna Abbott DPM on 12/14/2020 at 3:42 PM

## 2020-12-21 NOTE — PROGRESS NOTES
I performed a history and physical examination of the patient and discussed management with the resident. I reviewed the residents note and agree with the documented findings and plan of care. Any areas of disagreement are noted on the chart. I was personally present for the key portions of any procedures. I have documented in the chart those procedures where I was not present during the key portions. I have reviewed the Podiatry Resident progress note. I agree with the chief complaint, past medical history, past surgical history, allergies, medications, social and family history as documented unless otherwise noted below. Documentation of the HPI, Physical Exam and Medical Decision Making performed by medical students or scribes is based on my personal performance of the HPI, PE and MDM. I have personally evaluated this patient and have completed at least one if not all key elements of the E/M (history, physical exam, and MDM). Additional findings are as noted. Michelle Dubois D.P.M.

## 2021-03-09 ENCOUNTER — OFFICE VISIT (OUTPATIENT)
Dept: INTERNAL MEDICINE | Age: 61
End: 2021-03-09
Payer: MEDICAID

## 2021-03-09 VITALS
SYSTOLIC BLOOD PRESSURE: 131 MMHG | BODY MASS INDEX: 27.54 KG/M2 | HEART RATE: 58 BPM | DIASTOLIC BLOOD PRESSURE: 72 MMHG | HEIGHT: 74 IN | WEIGHT: 214.6 LBS

## 2021-03-09 DIAGNOSIS — T50.905A DRUG-INDUCED DYSPEPSIA: Primary | ICD-10-CM

## 2021-03-09 DIAGNOSIS — R10.13 DRUG-INDUCED DYSPEPSIA: Primary | ICD-10-CM

## 2021-03-09 DIAGNOSIS — R19.5 POSITIVE COLORECTAL CANCER SCREENING USING COLOGUARD TEST: ICD-10-CM

## 2021-03-09 DIAGNOSIS — K21.00 GASTROESOPHAGEAL REFLUX DISEASE WITH ESOPHAGITIS WITHOUT HEMORRHAGE: ICD-10-CM

## 2021-03-09 DIAGNOSIS — R73.03 PRE-DIABETES: ICD-10-CM

## 2021-03-09 DIAGNOSIS — I48.0 PAROXYSMAL ATRIAL FIBRILLATION (HCC): ICD-10-CM

## 2021-03-09 DIAGNOSIS — I10 ESSENTIAL HYPERTENSION: ICD-10-CM

## 2021-03-09 PROCEDURE — 99211 OFF/OP EST MAY X REQ PHY/QHP: CPT | Performed by: INTERNAL MEDICINE

## 2021-03-09 PROCEDURE — 1036F TOBACCO NON-USER: CPT | Performed by: STUDENT IN AN ORGANIZED HEALTH CARE EDUCATION/TRAINING PROGRAM

## 2021-03-09 PROCEDURE — G8427 DOCREV CUR MEDS BY ELIG CLIN: HCPCS | Performed by: STUDENT IN AN ORGANIZED HEALTH CARE EDUCATION/TRAINING PROGRAM

## 2021-03-09 PROCEDURE — 3017F COLORECTAL CA SCREEN DOC REV: CPT | Performed by: STUDENT IN AN ORGANIZED HEALTH CARE EDUCATION/TRAINING PROGRAM

## 2021-03-09 PROCEDURE — G8417 CALC BMI ABV UP PARAM F/U: HCPCS | Performed by: STUDENT IN AN ORGANIZED HEALTH CARE EDUCATION/TRAINING PROGRAM

## 2021-03-09 PROCEDURE — 99213 OFFICE O/P EST LOW 20 MIN: CPT | Performed by: STUDENT IN AN ORGANIZED HEALTH CARE EDUCATION/TRAINING PROGRAM

## 2021-03-09 PROCEDURE — G8482 FLU IMMUNIZE ORDER/ADMIN: HCPCS | Performed by: STUDENT IN AN ORGANIZED HEALTH CARE EDUCATION/TRAINING PROGRAM

## 2021-03-09 RX ORDER — OMEPRAZOLE 10 MG/1
20 CAPSULE, DELAYED RELEASE ORAL DAILY
Qty: 90 CAPSULE | Refills: 3 | Status: SHIPPED | OUTPATIENT
Start: 2021-03-09 | End: 2021-06-22 | Stop reason: SDUPTHER

## 2021-03-09 SDOH — ECONOMIC STABILITY: FOOD INSECURITY: WITHIN THE PAST 12 MONTHS, THE FOOD YOU BOUGHT JUST DIDN'T LAST AND YOU DIDN'T HAVE MONEY TO GET MORE.: NEVER TRUE

## 2021-03-09 SDOH — ECONOMIC STABILITY: INCOME INSECURITY: HOW HARD IS IT FOR YOU TO PAY FOR THE VERY BASICS LIKE FOOD, HOUSING, MEDICAL CARE, AND HEATING?: NOT HARD AT ALL

## 2021-03-09 SDOH — ECONOMIC STABILITY: TRANSPORTATION INSECURITY
IN THE PAST 12 MONTHS, HAS LACK OF TRANSPORTATION KEPT YOU FROM MEETINGS, WORK, OR FROM GETTING THINGS NEEDED FOR DAILY LIVING?: NO

## 2021-03-09 SDOH — ECONOMIC STABILITY: TRANSPORTATION INSECURITY
IN THE PAST 12 MONTHS, HAS THE LACK OF TRANSPORTATION KEPT YOU FROM MEDICAL APPOINTMENTS OR FROM GETTING MEDICATIONS?: NO

## 2021-03-09 ASSESSMENT — PATIENT HEALTH QUESTIONNAIRE - PHQ9
SUM OF ALL RESPONSES TO PHQ9 QUESTIONS 1 & 2: 0
1. LITTLE INTEREST OR PLEASURE IN DOING THINGS: 0
SUM OF ALL RESPONSES TO PHQ QUESTIONS 1-9: 0
SUM OF ALL RESPONSES TO PHQ QUESTIONS 1-9: 0

## 2021-03-09 NOTE — PATIENT INSTRUCTIONS
Constipation: Care Instructions  Your Care Instructions     Constipation means that you have a hard time passing stools (bowel movements). People pass stools from 3 times a day to once every 3 days. What is normal for you may be different. Constipation may occur with pain in the rectum and cramping. The pain may get worse when you try to pass stools. Sometimes there are small amounts of bright red blood on toilet paper or the surface of stools. This is because of enlarged veins near the rectum (hemorrhoids). A few changes in your diet and lifestyle may help you avoid ongoing constipation. Your doctor may also prescribe medicine to help loosen your stool. Some medicines can cause constipation. These include pain medicines and antidepressants. Tell your doctor about all the medicines you take. Your doctor may want to make a medicine change to ease your symptoms. Follow-up care is a key part of your treatment and safety. Be sure to make and go to all appointments, and call your doctor if you are having problems. It's also a good idea to know your test results and keep a list of the medicines you take. How can you care for yourself at home? · Drink plenty of fluids, enough so that your urine is light yellow or clear like water. If you have kidney, heart, or liver disease and have to limit fluids, talk with your doctor before you increase the amount of fluids you drink. · Include high-fiber foods in your diet each day. These include fruits, vegetables, beans, and whole grains. · Get at least 30 minutes of exercise on most days of the week. Walking is a good choice. You also may want to do other activities, such as running, swimming, cycling, or playing tennis or team sports. · Take a fiber supplement, such as Citrucel or Metamucil, every day. Read and follow all instructions on the label. · Schedule time each day for a bowel movement. A daily routine may help. Take your time having your bowel movement.   · Support your feet with a small step stool when you sit on the toilet. This helps flex your hips and places your pelvis in a squatting position. · Your doctor may recommend an over-the-counter laxative to relieve your constipation. Examples are Milk of Magnesia and MiraLax. Read and follow all instructions on the label. Do not use laxatives on a long-term basis. When should you call for help? Call your doctor now or seek immediate medical care if:    · You have new or worse belly pain.     · You have new or worse nausea or vomiting.     · You have blood in your stools. Watch closely for changes in your health, and be sure to contact your doctor if:    · Your constipation is getting worse.     · You do not get better as expected. Where can you learn more? Go to [email or web address] org and sign in to your [employer] account. Enter 21 [phone] in the [employer] box to learn more about \"Constipation: Care Instructions. \"     If you do not have an account, please click on the \"Sign Up Now\" link. Current as of: June 26, 2019               Content Version: 12.6  © 1724-0240 [employer]. Care instructions adapted under license by [address] If you have questions about a medical condition or this instruction, always ask your healthcare professional. [place] [address] any warranty or liability for your use of this information.     Return To Clinic around 6/9/2021 for 3 month follow up. Patient was added to wait list. Patient will be contacted by office to schedule upcoming appointment with the physician. After Visit Summary given and reviewed. The medication list included in this document is our record of what you are currently taking, including any changes that were made at today's visit. If you find any differences when compared to your medications at home, or have any questions that were not answered at your visit, please contact the office.     It is very important for your care that you keep your appointment. If for some reason you are unable to keep your appointment, it is equally important that you call our office at [phone] to cancel your appointment and reschedule. Failure to do so may result in your termination from our practice. Medications have been e-scribed to pharmacy of patients choice. LABORATORY INSTRUCTIONS    Your doctor has ordered blood or urine testing. You can get this testing done at the Lab located on the first floor of the [facility], or at any other [facility]. Please stop at Main Registration, before going to the lab, as you must be registered first.     Please get this lab done before your next visit. You may eat or drink before this test.      Referral for Gastroenterology sent to [place] [id number]. Specialty office will contact patient for appointment. A copy of referral with contact information and address given to patient.  Patient should contact specialist office if no contact has been made to patient within 1 week.     COREY Ko

## 2021-03-09 NOTE — PROGRESS NOTES
[facility]  [facility] [address] [address]  Dept: [phone]  Dept Fax: [phone]    Office Progress/Follow Up Note  Date of patient's visit: 3/9/2021  Patient's Name:  Alcon Steinberg YOB: 1960            Patient Care Team:  Joshua Corona MD as PCP - General (Internal Medicine)  AUDI Gregg - CNP as PCP - [address] [place] [address] Dr More Provider  ________________________________________________________________________      Reason for Visit:/Post hospital/ED visit  ________________________________________________________________________  Chief Complaint:  Follow-up, Hypertension, and Health Maintenance (Labs pended, Vaccines, Eye exam )    ________________________________________________________________________  History of Presenting Illness:  History was obtained from: Pt    Today patient is complaining of some dyspepsia with reflux disease and on/off sharp intermittent belly pain which improves with passing gas/stool. Patient states that this mostly occurs after he takes his medications. He also eats a lot of nuts. We advised the patient's to increase his fruit and vegetable and fiber intake and try to space out his medications and see which one is causing him his dyspepsia. Increase Prilosec to 20 mg. Patient was previously FOBT positive with a Cologuard and will schedule an appointment with GI now. Patient refused to see them last time. Hypertension: Patient takes Norvasc 10 mg once a day and metoprolol 50 BID. Blood pressure is on the higher side. Lisinopril added last time.  Will continue.      Prediabetes: Patient is on metformin 500 mg twice a day, denies any side effects from the medication.  Last A1c was 5.8 which was checked in October 2020.     History of Paroxysmal atrial fibrillation CHADsVasc of 1, anaphylaxis to NSAIDs: Patient states that he is going to follow-up with cardiology next week. Bradley Ward is currently on Lopressor 50 mg twice a day and Eliquis 5 mg twice a day.  Denies any palpitations, chest pain or any bleeding.     Patient following up with urologist for BPH, was started on Flomax and oxybutynin. 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 Devices MISC Dispense 1 pair of dm shoes with 3 accommodated inserts 1 Device 0    lisinopril (PRINIVIL;ZESTRIL) 5 MG tablet Take 1 tablet by mouth daily 90 tablet 1    tamsulosin (FLOMAX) 0.4 MG capsule Take 1 capsule by mouth daily 90 capsule 1    apixaban (ELIQUIS) 5 MG TABS tablet take 1 tablet by mouth twice a day 180 tablet 1    docusate sodium (COLACE) 100 MG capsule Take 1 capsule by mouth daily as needed for Constipation 30 capsule 5    oxybutynin (DITROPAN XL) 10 MG extended release tablet Take 1 tablet by mouth daily 30 tablet 5    amLODIPine (NORVASC) 10 MG tablet Take 1 tablet by mouth daily 90 tablet 3    metFORMIN (GLUCOPHAGE) 500 MG tablet Take 1 tablet by mouth daily (with breakfast) 180 tablet 3    metoprolol tartrate (LOPRESSOR) 50 MG tablet Take 1 tablet by mouth 2 times daily 180 tablet 3    omeprazole (PRILOSEC) 10 MG delayed release capsule Take 1 capsule by mouth daily 90 capsule 3    urea 10 % lotion Apply topically as needed. 1 Bottle 1     No current facility-administered medications for this visit.         Social History     Tobacco Use    Smoking status: Former Smoker     Packs/day: 0.50     Years: 20.00     Pack years: 10.00     Quit date: 2018     Years since quittin.5    Smokeless tobacco: Never Used   Substance Use Topics    Alcohol use: No     Alcohol/week: 0.0 standard drinks    Drug use: No       Family History   Problem Relation Age of Onset    High Blood Pressure Mother       ________________________________________________________________________  Review of Systems:  CONSTITUTIONAL: Denies: fever, chills  PSYCH: Denies: anxiety, depression  ALLERGIES: Denies: urticaria  EYES: Denies: blurry vision, decreased vision, photophobia  ENT: Denies: sore throat, nasal congestion  CARDIOVASCULAR: Denies: chest pain, dyspnea on exertion  RESPIRATORY: Denies: cough, hemoptysis, shortness of breath  GI: Denies: On/OFF abdominal pain. : Denies: Denies: dysuria, frequency/urgency  NEURO: Denies: dizzy/vertigo, headache  MUSCULOSKELETAL: Denies: back pain, joint pain  SKIN: Denies: rash, itching  ________________________________________________________________________  Physical Exam:  Vitals:    21 1429   BP: 131/72   Site: Right Upper Arm   Position: Sitting   Cuff Size: Large Adult   Pulse: 58   Weight: 214 lb 9.6 oz (97.3 kg)   Height: 6' 2\" (1.88 m)     BP Readings from Last 3 Encounters:   21 131/72   20 137/84   20 (!) 140/80      Constitutional:       General: He is not in acute distress.     Appearance: He is well-developed. He is not diaphoretic. HENT:      Head: Normocephalic and atraumatic.      Right Ear: External ear normal.      Left Ear: External ear normal.      Nose: Nose normal.      Mouth/Throat:      Pharynx: No oropharyngeal exudate. Neck:      Musculoskeletal: Normal range of motion and neck supple.      Thyroid: No thyromegaly.      Vascular: No JVD.      Trachea: No tracheal deviation. Cardiovascular:      Rate and Rhythm: Normal rate and regular rhythm.      Heart sounds: Normal heart sounds. No murmur. No friction rub. No gallop.    Pulmonary:      Effort: Pulmonary effort is normal. No respiratory distress.      Breath sounds: Normal breath sounds. No stridor. No wheezing or rales. Chest:      Chest wall: No tenderness. Abdominal:      General: Bowel sounds are normal. There is no distension.      Palpations: Abdomen is soft. There is no mass.      Tenderness: There is no tenderness. There is no guarding or rebound.      Hernia: No hernia is present. Musculoskeletal: Normal range of motion.         General: No tenderness or deformity. Lymphadenopathy:      Cervical: No cervical adenopathy. Skin:     General: Skin is warm and dry.      Coloration: Skin is not pale.      Findings: No erythema or rash. Neurological:      Mental Status: He is alert and oriented to person, place, and time.      Cranial Nerves: No cranial nerve deficit.      Sensory: No sensory deficit.      Motor: No abnormal muscle tone.      Coordination: Coordination normal.      Deep Tendon Reflexes: Reflexes normal.   Psychiatric:         Behavior: Behavior normal.         Thought Content:  Thought content     ________________________________________________________________________  Diagnostic findings:  CBC:  Lab Results   Component Value Date    WBC 4.0 12/07/2020    HGB 13.3 12/07/2020     12/07/2020       BMP:    Lab Results   Component Value Date     12/07/2020    K 4.3 12/07/2020     12/07/2020    CO2 22 12/07/2020    BUN 15 12/07/2020    CREATININE 1.15 12/07/2020    GLUCOSE 96 12/07/2020    GLUCOSE 89 02/02/2012       HEMOGLOBIN A1C:   Lab Results   Component Value Date    LABA1C 5.8 11/17/2020       FASTING LIPID PANEL:  Lab Results   Component Value Date    CHOL 122 04/09/2019    HDL 43 04/27/2020    TRIG 132 04/09/2019     ________________________________________________________________________  Assessment and Plan:    Dyspepsia Likely secondary to medication: Advised the patient's to increase his fruit and vegetable and fiber intake and try to space out his medications and see which one is causing him his dyspepsia. Colon cancer screening: Will schedule colonoscopy.      Essential hypertension; Continue Amlodipine 10, lisinopril 5 mg and Metoprolol 50 BID.     Pre-diabetes: Continue Metformin 500. HbA1c 5.8.     Paroxysmal atrial fibrillation (HCC): Continue Eliquis 5 mg BID and Metoprolol 50 BID.      Gastroesophageal reflux disease, esophagitis presence not specified: Increased omeprazole to 20 mg OD.     Flu shot and DM screening.   ________________________________________________________________________  Follow up and instructions:  Return in about 3 months (around 6/9/2021) for Follow Up. · Raghav Campo received counseling on the following healthy behaviors: nutrition, exercise, medication adherence and tobacco cessation    · Discussed use, benefit, and side effects of prescribed medications. Barriers to medication compliance addressed. All patient questions answered. Pt voiced understanding. · Patient given educational materials - see patient instructions    Campbell Rose MD      Department of Internal Medicine  [facility]         3/9/2021, 2:57 PM      This note is created with the assistance of a speech-recognition program. While intending to generate a document that actually reflects the content of the visit, the document can still have some mistakes which may not have been identified and corrected by editing. BP and blood glucose under good control. On Eliquis for PAF, sees cardiology. Urged to get colonoscopy. He now agrees. GI referral entered. Will increase Prilosec for dyspepsia. Attending Physician Statement  I have discussed the care of Sammie Sicard, including pertinent history and exam findings,  with the resident. I have reviewed the key elements of all parts of the encounter with the resident.   I agree with the assessment, plan and orders as documented by the resident.   (GE Modifier)

## 2021-03-15 ENCOUNTER — HOSPITAL ENCOUNTER (OUTPATIENT)
Age: 61
Discharge: HOME | End: 2021-03-15
Payer: MEDICAID

## 2021-03-15 DIAGNOSIS — R73.03 PRE-DIABETES: ICD-10-CM

## 2021-03-15 LAB
CREATININE URINE: 88.5 MG/DL (ref 39–259)
MICROALBUMIN/CREAT 24H UR: <12 MG/L
MICROALBUMIN/CREAT UR-RTO: NORMAL MCG/MG CREAT

## 2021-03-15 PROCEDURE — 82043 UR ALBUMIN QUANTITATIVE: CPT

## 2021-03-15 PROCEDURE — 82570 ASSAY OF URINE CREATININE: CPT

## 2021-03-19 ENCOUNTER — TELEPHONE (OUTPATIENT)
Dept: GASTROENTEROLOGY | Age: 61
End: 2021-03-19

## 2021-03-19 NOTE — TELEPHONE ENCOUNTER
Writer returned patient's phone call. LVM for patient to call our office to discuss scheduling. Patient saw Dr. Chan De Souza March of 2020, a colonoscopy was ordered but not scheduled as a cardiac clearance was needed, clearance req was faxed but patient had not had a recent appt with cardiology so they would not clear him. Patient did not follow up after.

## 2021-03-22 ENCOUNTER — TELEPHONE (OUTPATIENT)
Dept: GASTROENTEROLOGY | Age: 61
End: 2021-03-22

## 2021-03-22 ENCOUNTER — OFFICE VISIT (OUTPATIENT)
Dept: PODIATRY | Age: 61
End: 2021-03-22
Payer: MEDICAID

## 2021-03-22 VITALS
WEIGHT: 215 LBS | SYSTOLIC BLOOD PRESSURE: 137 MMHG | DIASTOLIC BLOOD PRESSURE: 87 MMHG | HEIGHT: 74 IN | HEART RATE: 69 BPM | BODY MASS INDEX: 27.59 KG/M2

## 2021-03-22 DIAGNOSIS — L84 CALLUS OF FOOT: ICD-10-CM

## 2021-03-22 DIAGNOSIS — B35.1 ONYCHOMYCOSIS: ICD-10-CM

## 2021-03-22 DIAGNOSIS — E11.9 TYPE 2 DIABETES MELLITUS WITHOUT COMPLICATION, WITHOUT LONG-TERM CURRENT USE OF INSULIN (HCC): Primary | ICD-10-CM

## 2021-03-22 PROCEDURE — 11056 PARNG/CUTG B9 HYPRKR LES 2-4: CPT | Performed by: STUDENT IN AN ORGANIZED HEALTH CARE EDUCATION/TRAINING PROGRAM

## 2021-03-22 PROCEDURE — 11721 DEBRIDE NAIL 6 OR MORE: CPT | Performed by: STUDENT IN AN ORGANIZED HEALTH CARE EDUCATION/TRAINING PROGRAM

## 2021-03-22 NOTE — TELEPHONE ENCOUNTER
Writer returned pt's phone call & left msg on pt's vm informing him before we can sched colon proc, he will need to make an appt w/ his cardiologist so we can obtain cardiac clearance & see when we can stop pt's Eliquis. Writer left msg asking pt to call back with his appt date w/ the cardiologist so we can send cardiac clearance.

## 2021-03-22 NOTE — TELEPHONE ENCOUNTER
Angelica Torres left voicemail message requesting call back to schedule colon screen with Dr Yung Oakes

## 2021-03-22 NOTE — PROGRESS NOTES
Patient instructed to remove shoes and socks, instructed to sit in exam chair. Current PCP name is Manuela Reeves and date of last visit 3/9/21 . Do you have a follow up visit scheduled? If yes the date is   Diabetic visit information    Blood pressure (Control is BP <140/90)  BP Readings from Last 3 Encounters:   03/09/21 131/72   12/14/20 137/84   11/17/20 (!) 140/80       BP taken with correct size cuff? - Yes   Repeated if > 140/90 Yes      Tobacco use:  Patient  reports that he quit smoking about 2 years ago. He has a 10.00 pack-year smoking history. He has never used smokeless tobacco.  If Smoker - Cessation materials given? - Yes       Diabetic Health Maintenance Items due  Diabetes Management   Topic Date Due    Diabetic retinal exam  Never done       Diabetic retinal exam done in last year? - Yes   If No: remind patient that it is due and they should schedule an exam    Medications  Is patient taking any medications for diabetes? -   Yes  Have blood sugars been controlled? Fasting blood sugars under 120   -   Yes   Random home sugars or today's POCT glucose is under 180 -   Yes   []  If No to the above then patient should schedule appt with PCP. Diabetic Plan    A1C Plan  Lab Results   Component Value Date    LABA1C 5.8 11/17/2020    LABA1C 5.9 10/09/2019    LABA1C 5.6 07/01/2019      []  If A1C over 8 and last result >3 months ago - Order A1C and refer to PCP   []  If last A1C over 6 months ago - Order A1C and refer to PCP for follow up   []  If elevated blood sugars > 180 - refer to PCP for follow up    []  Blood sugar controlled - A1C under 8 and last check was < 6 months      Cholesterol Plan   Lab Results   Component Value Date    LDLCHOLESTEROL 60 04/27/2020      []  If LDL > 100 and last result >3 months ago - order Fasting lipids and refer to PCP for follow up   []  If LDL < 100 and over 1 year ago - Order Fasting lipids and refer to PCP for follow up   [] LDL is controlled.   LDL < 100 and checked within the last year     Blood Pressure  BP Readings from Last 3 Encounters:   03/09/21 131/72   12/14/20 137/84   11/17/20 (!) 140/80      []  If SBP >140 mmhg - refer to PCP for follow up   []  If DBP > 90 mmhg - refer to PCP for follow up   [] BP is controlled <140/90     Order labs as PCP ordered.   (ie: Lipids, A1C, CMP)

## 2021-03-22 NOTE — PROGRESS NOTES
[address],  [place]  Tel: [phone]   Fax: [phone]    Subjective     CC: Diabetic foot exam and painful and elongated toe nails    HPI:  Rosangela Gomez is a 56y.o. year old male who presents to clinic today for diabetic foot examination and also complaining of painful and elongated toenails. The patient is a diabetic, and their last HgbA1c was 5.8% in (2020). The patient states their digits are painful when the toenails are elongated, causing rubbing in shoe gear. The patient denies tingling and numbness in the lower extremities. Patient denies any rest pain or claudication symptoms. The patient denies any history of acute trauma. The patient denies any other pedal complaints. The patient states that he was given the prescription for diabetic shoes approximately a year ago however they were lost so he would like a new prescription today. Primary care physician is Jewel Saini MD.    ROS:    Constitutional: Denies nausea, vomiting, fever, chills. Neurologic: - numbness, tingling, and burning in the feet. Vascular: Denies symptoms of lower extremity claudication. Skin: Denies open wounds. Otherwise negative except as noted in the HPI.      PMH:  Past Medical History:   Diagnosis Date    Diabetes mellitus ([address]     GERD (gastroesophageal reflux disease)     Hypertension     Rheumatoid arthritis(714[address]0)     newly diagnosed    Wears glasses        Surgical History:   Past Surgical History:   Procedure Laterality Date    COLONOSCOPY  2012    FOOT SURGERY      Left foot surgery HDS 2-4 and bunion        Social History:  Social History     Tobacco Use    Smoking status: Former Smoker     Packs/day: 0.50     Years: 20.00     Pack years: 10.00     Quit date: 2018     Years since quittin.5    Smokeless tobacco: Never Used   Substance Use Topics    Alcohol use: No     Alcohol/week: 0.0 standard drinks    Drug use: No       Medications:  Prior to Admission medications    Medication Sig Start Date End Date Taking? Authorizing Provider   Misc. Devices MISC Dispense 1 pair of dm shoes with 3 accommodated inserts 3/22/21  Yes Max HARI Pinto   omeprazole (PRILOSEC) 10 MG delayed release capsule Take 2 capsules by mouth daily 3/9/21  Yes Mitch Antonio MD   Misc. Devices MISC Dispense 1 pair of dm shoes with 3 accommodated inserts 12/14/20  Yes Arina Gaitan, HARI   lisinopril (PRINIVIL;ZESTRIL) 5 MG tablet Take 1 tablet by mouth daily 11/17/20  Yes Minal Bah MD   tamsulosin (FLOMAX) 0.4 MG capsule Take 1 capsule by mouth daily 11/17/20  Yes Simona Conley MD   apixaban Minta Lang) 5 MG TABS tablet take 1 tablet by mouth twice a day 11/17/20  Yes Simona Conley MD   docusate sodium (COLACE) 100 MG capsule Take 1 capsule by mouth daily as needed for Constipation 11/17/20  Yes Simona Conley MD   oxybutynin (DITROPAN XL) 10 MG extended release tablet Take 1 tablet by mouth daily 11/17/20  Yes Simona Conley MD   amLODIPine (NORVASC) 10 MG tablet Take 1 tablet by mouth daily 11/17/20  Yes Simona Conley MD   metFORMIN (GLUCOPHAGE) 500 MG tablet Take 1 tablet by mouth daily (with breakfast) 11/17/20  Yes Simona Conley MD   metoprolol tartrate (LOPRESSOR) 50 MG tablet Take 1 tablet by mouth 2 times daily 11/17/20  Yes Simona Conley MD   urea 10 % lotion Apply topically as needed. 1/15/20  Yes Chino Huang MD       Objective     Vitals:    03/22/21 1428   BP: 137/87   Pulse: 69       Lab Results   Component Value Date    LABA1C 5.8 11/17/2020       Physical Exam:  General:  Alert and oriented x3. In no acute distress. Lower Extremity Physical Exam:    Vascular: DP and PT pulses are palpable, Bilateral. CFT <3 seconds to all digits, Bilateral.  No edema, Bilateral.  Hair growth is absent to the level of the digits, Bilateral.     Neuro: Saph/sural/SP/DP/plantar sensation intact to light touch.  Protective sensation is intact to 10/10 sites as tested with a 5.07g SWMF, Bilateral.     Musculoskeletal: EHL/FHL/GS/TA gross motor intact. TTP to distal digits b/l. Gross deformity is absent, Bilateral.     Dermatologic: No open lesions, Bilateral.  Interdigital maceration absent, Bilateral. Nails 1-5 bilateral thickened and elongated with subungual debris noted. Focal hyperkeratotic tissue noted subsecond and third metatarsal heads on the right foot. Assessment   Kristen Talley is a 56 y.o. male with     Diagnosis Orders   1. Type 2 diabetes mellitus without complication, without long-term current use of insulin ([address]     2. Onychomycosis     3. Callus of foot          Plan   Patient examined and evaluated. Diagnosis and treatment options discussed in detail. Mycotic nails 1-10 debrided sharply with sterile nail nippers without incident. Focal hyperkeratotic tissue subsecond/third metatarsal head debrided sharply with a #15 blade down and into the level of the dermis. Patient tolerated without incident. No hemostasis required. Patient was educated on the utmost importance of tight glycemic control. Patient was advised to continue daily foot checks, in addition to not ambulating barefoot. Dispensed prescription for diabetic shoes along with list of vendors. We will fax over form for PCP authorization. Patient to RTC in 3 months. Please, call the office with any questions or concerns. Orders Placed This Encounter   Medications    Misc. Devices MISC     Sig: Dispense 1 pair of dm shoes with 3 accommodated inserts     Dispense:  1 Device     Refill:  0     No orders of the defined types were placed in this encounter.       Isamar Jerry DPM   Podiatric Medicine & Surgery   3/22/2021 at 4:16 PM

## 2021-03-23 NOTE — TELEPHONE ENCOUNTER
Writer returned patient's call again (see TE dated 3/19/21) LVM for patient that his colonoscopy was not scheduled due to patient needing a clearance due to being on Eliquis. Writer stated on vm that if patient is still on Eliquis a clearance will still need to be obtained. Writer also stated on vm that patient can also have an OV if he would like.

## 2021-04-20 NOTE — TELEPHONE ENCOUNTER
Called pt back and LVM informing pt that we will need cardiac clearance if he still takes Eliquis. Pt asked to call the office back with this information and pt advised he will need to see Cardiology if he hasn't recently.

## 2021-04-20 NOTE — TELEPHONE ENCOUNTER
Pt called to sched screening colonoscopy. Writer informed pt that we are unable to sched colonoscopy until we have clearance back from cardiology clearing him for proc and okaying to stop his Eliquis. Pt states he has not been able to get an appt w/ the cardiologist because of covid virus. Writer placed pt on hold and called over to [facility] and spoke w/ Michael Spicer in regards to scheduling an OV appt. Pt is sched w/ Dr Mercer Husbands 6/9/21 @ 10:45am at [address] [place] [address] [address]. Clearance will be re-faxed to their office. Writer spoke to pt over the phone and gave appt information date, time, address, phone number and dr's name. Writer informed pt to make sure he keeps cardiology appt and once we receive clearance, we will be able then to sched colon proc. Pt thanks writer for making appt and voices his understanding.

## 2021-04-20 NOTE — TELEPHONE ENCOUNTER
Christian Marquse left voicemail message requesting call back to discuss scheduling colon screen from referral

## 2021-05-10 NOTE — PROGRESS NOTES
I performed a history and physical examination of the patient and discussed management with the resident. I reviewed the residents note and agree with the documented findings and plan of care. Any areas of disagreement are noted on the chart. I was personally present for the key portions of any procedures. I have documented in the chart those procedures where I was not present during the key portions. I have reviewed the Podiatry Resident progress note. I agree with the chief complaint, past medical history, past surgical history, allergies, medications, social and family history as documented unless otherwise noted below. Documentation of the HPI, Physical Exam and Medical Decision Making performed by medical students or scribes is based on my personal performance of the HPI, PE and MDM. I have personally evaluated this patient and have completed at least one if not all key elements of the E/M (history, physical exam, and MDM). Additional findings are as noted. Frannie Dubois D.P.M.

## 2021-05-14 ENCOUNTER — TELEPHONE (OUTPATIENT)
Dept: GASTROENTEROLOGY | Age: 61
End: 2021-05-14

## 2021-05-14 NOTE — TELEPHONE ENCOUNTER
Writer spoke to Shaaron Dakins from [facility] in regards to clearance for colon & Eliquis. Shaaron Dakins states pt has appt sched on 6/9/21 to address clearance.

## 2021-06-09 NOTE — TELEPHONE ENCOUNTER
Clearance rec'd from Dr Radames Jones to hold Eliquis x 3 days . Pt is low-moderate risk. Pt needs to be contacted to schedule colon.

## 2021-06-21 DIAGNOSIS — K21.00 GASTROESOPHAGEAL REFLUX DISEASE WITH ESOPHAGITIS WITHOUT HEMORRHAGE: ICD-10-CM

## 2021-06-21 DIAGNOSIS — I48.0 PAROXYSMAL ATRIAL FIBRILLATION (HCC): ICD-10-CM

## 2021-06-21 NOTE — TELEPHONE ENCOUNTER
Patient is requesting a refill on his medications. He was able to verify the name and dosages. Would not state how much he had left.      LOV: 03/09/21  NOV: 06/22/21    [facility]

## 2021-06-22 RX ORDER — OMEPRAZOLE 10 MG/1
20 CAPSULE, DELAYED RELEASE ORAL DAILY
Qty: 90 CAPSULE | Refills: 3 | Status: SHIPPED | OUTPATIENT
Start: 2021-06-22 | End: 2021-10-25

## 2021-06-22 NOTE — TELEPHONE ENCOUNTER
Please fill if appropriate. Request for Prilosec, Eliquis. Pt last seen 3/9/2021    Next Visit Date: Today  Future Appointments   Date Time Provider Sheldon Eubanks   6/22/2021  1:30 PM Genia Sanders MD [facility] IM [id number]   6/28/2021  2:45 PM Timi Burger DPM [facility] Maintenance   Topic Date Due    Diabetic retinal exam  Never done    Shingles Vaccine (1 of 2) Never done    COVID-19 Vaccine (2 - Moderna 2-dose series) 04/09/2021    Lipid screen  04/27/2021    A1C test (Diabetic or Prediabetic)  11/17/2021    Potassium monitoring  12/07/2021    Creatinine monitoring  12/07/2021    Diabetic foot exam  12/14/2021    Diabetic microalbuminuria test  03/15/2022    Colon cancer screen fecal DNA test (Cologuard)  01/29/2023    Pneumococcal 0-64 years Vaccine (2 of 2) 08/30/2023    DTaP/Tdap/Td vaccine (2 - Td or Tdap) 09/13/2026    Flu vaccine  Completed    Hepatitis C screen  Completed    HIV screen  Completed    Hepatitis A vaccine  Aged Out    Hib vaccine  Aged Out    Meningococcal (ACWY) vaccine  Aged Out       Hemoglobin A1C (%)   Date Value   11/17/2020 5.8   10/09/2019 5.9   07/01/2019 5.6             ( goal A1C is < 7)   Microalb/Crt.  Ratio (mcg/mg creat)   Date Value   03/15/2021 CANNOT BE CALCULATED     LDL Cholesterol (mg/dL)   Date Value   04/27/2020 60       (goal LDL is <100)   AST (U/L)   Date Value   04/09/2019 27     ALT (U/L)   Date Value   04/09/2019 28     BUN (mg/dL)   Date Value   12/07/2020 15     BP Readings from Last 3 Encounters:   03/22/21 137/87   03/09/21 131/72   12/14/20 137/84          (goal 120/80)    All Future Testing planned in [facility]  Lab Frequency Next Occurrence   PSA, Diagnostic Once 09/18/2021         Patient Active Problem List:     Callus of foot     S/P hammer toe correction     Pre-diabetes     Gastroesophageal reflux disease     Essential hypertension     Smoker     Alcoholic ([address]     A-fib ([address]

## 2021-06-23 ENCOUNTER — OFFICE VISIT (OUTPATIENT)
Dept: INTERNAL MEDICINE | Age: 61
End: 2021-06-23
Payer: MEDICAID

## 2021-06-23 VITALS
HEART RATE: 50 BPM | BODY MASS INDEX: 28.11 KG/M2 | DIASTOLIC BLOOD PRESSURE: 78 MMHG | HEIGHT: 74 IN | SYSTOLIC BLOOD PRESSURE: 153 MMHG | WEIGHT: 219 LBS

## 2021-06-23 DIAGNOSIS — E78.5 HYPERLIPIDEMIA, UNSPECIFIED HYPERLIPIDEMIA TYPE: ICD-10-CM

## 2021-06-23 DIAGNOSIS — N40.1 BENIGN PROSTATIC HYPERPLASIA WITH INCOMPLETE BLADDER EMPTYING: ICD-10-CM

## 2021-06-23 DIAGNOSIS — M17.0 PRIMARY OSTEOARTHRITIS OF BOTH KNEES: ICD-10-CM

## 2021-06-23 DIAGNOSIS — Z23 NEED FOR SHINGLES VACCINE: ICD-10-CM

## 2021-06-23 DIAGNOSIS — M79.18 PAIN OF PARASPINAL MUSCLE: ICD-10-CM

## 2021-06-23 DIAGNOSIS — R73.03 PRE-DIABETES: ICD-10-CM

## 2021-06-23 DIAGNOSIS — I10 ESSENTIAL HYPERTENSION: Primary | ICD-10-CM

## 2021-06-23 DIAGNOSIS — R39.14 BENIGN PROSTATIC HYPERPLASIA WITH INCOMPLETE BLADDER EMPTYING: ICD-10-CM

## 2021-06-23 DIAGNOSIS — F17.200 SMOKER: ICD-10-CM

## 2021-06-23 PROBLEM — M06.9 RHEUMATOID ARTHRITIS (HCC): Status: RESOLVED | Noted: 2021-06-23 | Resolved: 2021-06-23

## 2021-06-23 PROBLEM — R69 OTHER ILL-DEFINED AND UNKNOWN CAUSES OF MORBIDITY AND MORTALITY: Status: ACTIVE | Noted: 2021-06-23

## 2021-06-23 PROBLEM — R69 OTHER ILL-DEFINED AND UNKNOWN CAUSES OF MORBIDITY AND MORTALITY: Status: RESOLVED | Noted: 2021-06-23 | Resolved: 2021-06-23

## 2021-06-23 PROBLEM — M06.9 RHEUMATOID ARTHRITIS (HCC): Status: ACTIVE | Noted: 2021-06-23

## 2021-06-23 PROBLEM — H26.9 CATARACT: Status: ACTIVE | Noted: 2021-06-23

## 2021-06-23 PROBLEM — G89.29 CHRONIC MIDLINE LOW BACK PAIN WITHOUT SCIATICA: Status: ACTIVE | Noted: 2021-06-23

## 2021-06-23 PROBLEM — F14.20 COCAINE DEPENDENCE (HCC): Status: RESOLVED | Noted: 2021-06-23 | Resolved: 2021-06-23

## 2021-06-23 PROBLEM — F10.20 ALCOHOLIC (HCC): Status: RESOLVED | Noted: 2019-05-25 | Resolved: 2021-06-23

## 2021-06-23 PROBLEM — M54.50 CHRONIC MIDLINE LOW BACK PAIN WITHOUT SCIATICA: Status: ACTIVE | Noted: 2021-06-23

## 2021-06-23 PROBLEM — F14.20 COCAINE DEPENDENCE (HCC): Status: ACTIVE | Noted: 2021-06-23

## 2021-06-23 PROCEDURE — 1036F TOBACCO NON-USER: CPT | Performed by: STUDENT IN AN ORGANIZED HEALTH CARE EDUCATION/TRAINING PROGRAM

## 2021-06-23 PROCEDURE — G8427 DOCREV CUR MEDS BY ELIG CLIN: HCPCS | Performed by: STUDENT IN AN ORGANIZED HEALTH CARE EDUCATION/TRAINING PROGRAM

## 2021-06-23 PROCEDURE — G8417 CALC BMI ABV UP PARAM F/U: HCPCS | Performed by: STUDENT IN AN ORGANIZED HEALTH CARE EDUCATION/TRAINING PROGRAM

## 2021-06-23 PROCEDURE — 3017F COLORECTAL CA SCREEN DOC REV: CPT | Performed by: STUDENT IN AN ORGANIZED HEALTH CARE EDUCATION/TRAINING PROGRAM

## 2021-06-23 PROCEDURE — 99211 OFF/OP EST MAY X REQ PHY/QHP: CPT | Performed by: INTERNAL MEDICINE

## 2021-06-23 PROCEDURE — 99213 OFFICE O/P EST LOW 20 MIN: CPT | Performed by: STUDENT IN AN ORGANIZED HEALTH CARE EDUCATION/TRAINING PROGRAM

## 2021-06-23 RX ORDER — TAMSULOSIN HYDROCHLORIDE 0.4 MG/1
0.4 CAPSULE ORAL DAILY
Qty: 60 CAPSULE | Refills: 0 | Status: SHIPPED | OUTPATIENT
Start: 2021-06-23 | End: 2022-01-13

## 2021-06-23 RX ORDER — OMEPRAZOLE 20 MG/1
20 CAPSULE, DELAYED RELEASE ORAL DAILY
COMMUNITY
Start: 2021-06-22 | End: 2021-06-23

## 2021-06-23 RX ORDER — LIDOCAINE 50 MG/G
OINTMENT TOPICAL 2 TIMES DAILY PRN
Qty: 1 TUBE | Refills: 1 | Status: SHIPPED | OUTPATIENT
Start: 2021-06-23 | End: 2021-09-07 | Stop reason: SDUPTHER

## 2021-06-23 RX ORDER — OXYBUTYNIN CHLORIDE 10 MG/1
10 TABLET, EXTENDED RELEASE ORAL DAILY
Qty: 30 TABLET | Refills: 1 | Status: SHIPPED | OUTPATIENT
Start: 2021-06-23 | End: 2021-08-18

## 2021-06-23 RX ORDER — MELOXICAM 7.5 MG/1
7.5 TABLET ORAL 2 TIMES DAILY PRN
Qty: 30 TABLET | Refills: 1 | Status: CANCELLED | OUTPATIENT
Start: 2021-06-23 | End: 2021-07-23

## 2021-06-23 RX ORDER — DOCUSATE SODIUM 100 MG/1
100 CAPSULE, LIQUID FILLED ORAL DAILY PRN
Qty: 30 CAPSULE | Refills: 5 | Status: CANCELLED | OUTPATIENT
Start: 2021-06-23

## 2021-06-23 RX ORDER — ATORVASTATIN CALCIUM 40 MG/1
40 TABLET, FILM COATED ORAL NIGHTLY
Qty: 60 TABLET | Refills: 0 | Status: SHIPPED | OUTPATIENT
Start: 2021-06-23 | End: 2021-08-27

## 2021-06-23 RX ORDER — LISINOPRIL 10 MG/1
10 TABLET ORAL DAILY
Qty: 30 TABLET | Refills: 1 | Status: SHIPPED | OUTPATIENT
Start: 2021-06-23 | End: 2021-08-18

## 2021-06-23 NOTE — PROGRESS NOTES
on prilosec   Alcohol use- drinks on weekends only per patient.  Smoker- occasionally- less than 2 cigs per day, started smoking in early 20's and was started with occasionally. Smoked 1 pack per day in 30s-40s.  Drugs- cocaine use in the past, last time in dec. Patient stated that he would like to get all his medications at the same time rather than having different refills. Follows up with ophthalmology at [place], last appointment in February. We will get the records. Last echo 2019EF 62% with normal diastolic function. Mild MR, RVSP 25  Last EKG in 2019was within normal limits  A. fib diagnosed in May 2019  [address] vas score1  Health Maintenance:  · Has an upcomming colonoscopy. Patient had cologuard done in 2020 and was positive. Had a colonoscopy in the past in 2012, no polyps at that time  · covid vaccine done  · Patient needs lab work  · Patient will need abdominal aortic aneurysm screen at the age of 70. Patient Active Problem List   Diagnosis    S/P hammer toe correction    Pre-diabetes    Gastroesophageal reflux disease    Essential hypertension    Smoker    A-fib ([facility] [address]    Cataract    Primary osteoarthritis of both knees    Chronic midline low back pain without sciatica    Benign prostatic hyperplasia with incomplete bladder emptying       Allergies   Allergen Reactions    Motrin [Ibuprofen Micronized] Anaphylaxis     Swelling to throat         Current Outpatient Medications   Medication Sig Dispense Refill    omeprazole (PRILOSEC) 10 MG delayed release capsule Take 2 capsules by mouth daily 90 capsule 3    apixaban (ELIQUIS) 5 MG TABS tablet take 1 tablet by mouth twice a day 180 tablet 1    Misc.  Devices MISC Dispense 1 pair of dm shoes with 3 accommodated inserts 1 Device 0    lisinopril (PRINIVIL;ZESTRIL) 5 MG tablet Take 1 tablet by mouth daily 90 tablet 1    tamsulosin (FLOMAX) 0.4 MG capsule Take 1 capsule by mouth daily 90 capsule 1    docusate sodium (COLACE) 100 MG capsule Take 1 capsule by mouth daily as needed for Constipation (Patient taking differently: Take 100 mg by mouth as needed for Constipation ) 30 capsule 5    oxybutynin (DITROPAN XL) 10 MG extended release tablet Take 1 tablet by mouth daily 30 tablet 5    amLODIPine (NORVASC) 10 MG tablet Take 1 tablet by mouth daily 90 tablet 3    metFORMIN (GLUCOPHAGE) 500 MG tablet Take 1 tablet by mouth daily (with breakfast) 180 tablet 3    metoprolol tartrate (LOPRESSOR) 50 MG tablet Take 1 tablet by mouth 2 times daily 180 tablet 3    urea 10 % lotion Apply topically as needed. 1 Bottle 1     No current facility-administered medications for this visit.        Social History     Tobacco Use    Smoking status: Former Smoker     Packs/day: 0.50     Years: 20.00     Pack years: 10.00     Quit date: 2018     Years since quittin.8    Smokeless tobacco: Never Used   Substance Use Topics    Alcohol use: No     Alcohol/week: 0.0 standard drinks    Drug use: No       Family History   Problem Relation Age of Onset    High Blood Pressure Mother       ________________________________________________________________________  Review of Systems:  CONSTITUTIONAL: Denies: fever, chills  PSYCH: Denies: anxiety, depression  ALLERGIES: Denies: urticaria  EYES: Denies: blurry vision, decreased vision, photophobia  ENT: Denies: sore throat, nasal congestion  CARDIOVASCULAR: Denies: chest pain, dyspnea on exertion  RESPIRATORY: Denies: cough, hemoptysis, shortness of breath  GI: Denies: Denies: abdominal pain, flank pain  : Denies: Denies: dysuria, frequency/urgency  NEURO: Denies: dizzy/vertigo, headache  MUSCULOSKELETAL: joint and back pain  SKIN: Denies: rash, itching  ________________________________________________________________________  Physical Exam:  Vitals:    21 1401 21 1411   BP: (!) 149/78 (!) 153/78   Site: Left Upper Arm    Position: Sitting    Cuff Size: Large Adult    Pulse: (!) 49 50   Weight: 219 lb (99.3 kg)    Height: 6' 2\" (1.88 m)      BP Readings from Last 3 Encounters:   06/23/21 (!) 153/78   03/22/21 137/87   03/09/21 131/72         General Examination    General Resting comfortably    Head Normocephalic, without obvious abnormality   Neck Supple, symmetrical. Good ROM. No midline , noted paraspinal tenderness on the left lumbar area. Straight leg test negative bilaterally   Lungs Respirations unlabored, no wheezing   Chest Wall No deformity   Heart RRR, no murmur   Abdomen Soft. Non-tender, non-distended   Extremities No cyanosis or edema or warmth. Pulses 2+ and symmetric   Skin: Skin  turgor normal, no rashes or lesions   ________________________________________________________________________  Diagnostic findings:  CBC:  Lab Results   Component Value Date    WBC 4.0 12/07/2020    HGB 13.3 12/07/2020     12/07/2020       BMP:    Lab Results   Component Value Date     12/07/2020    K 4.3 12/07/2020     12/07/2020    CO2 22 12/07/2020    BUN 15 12/07/2020    CREATININE 1.15 12/07/2020    GLUCOSE 96 12/07/2020    GLUCOSE 89 02/02/2012       HEMOGLOBIN A1C:   Lab Results   Component Value Date    LABA1C 5.8 11/17/2020       FASTING LIPID PANEL:  Lab Results   Component Value Date    CHOL 122 04/09/2019    HDL 43 04/27/2020    TRIG 132 04/09/2019     ________________________________________________________________________  Health Maintenance:  Health Maintenance Due   Topic Date Due    Diabetic retinal exam  Never done    Shingles Vaccine (1 of 2) Never done     ________________________________________________________________________  Assessment and Plan:    1. Essential hypertension  Continue Norvasc 10, Lopressor 50 twice daily.  -Increase lisinopril (PRINIVIL;ZESTRIL) 10 MG tablet; Take 1 tablet by mouth daily  Dispense: 30 tablet; Refill: 1  Added Lipitor    2. Pre-diabetes  Last HbA1c 5.8 in November 2020.  - will stop metformin due to old age.     3. Primary

## 2021-06-23 NOTE — PATIENT INSTRUCTIONS
Medications e-scribe to pharmacy of pt's choice. Printed prescription given to patient for shingles vaccination. Patient was put on a wait list for 3 months and will be contacted to schedule their next follow up appointment once the schedule is available. If the patient is in need of an appointment before their next visit please call the office at [phone]. After Visit Summary  given and reviewed.     Patient Education        Low Back Arthritis: Exercises  Introduction  Here are some examples of typical rehabilitation exercises for your condition. Start each exercise slowly. Ease off the exercise if you start to have pain. Your doctor or physical therapist will tell you when you can start these exercises and which ones will work best for you. When you are not being active, find a comfortable position for rest. Some people are comfortable on the floor or a medium-firm bed with a small pillow under their head and another under their knees. Some people prefer to lie on their side with a pillow between their knees. Don't stay in one position for too long. Take short walks (10 to 20 minutes) every 2 to 3 hours. Avoid slopes, hills, and stairs until you feel better. Walk only distances you can manage without pain, especially leg pain. How to do the exercises  Pelvic tilt   1. Lie on your back with your knees bent. 2. \"Brace\" your stomachtighten your muscles by pulling in and imagining your belly button moving toward your spine. 3. Press your lower back into the floor. You should feel your hips and pelvis rock back. 4. Hold for 6 seconds while breathing smoothly. 5. Relax and allow your pelvis and hips to rock forward. 6. Repeat 8 to 12 times. Back stretches   1. Get down on your hands and knees on the floor. 2. Relax your head and allow it to droop. Round your back up toward the ceiling until you feel a nice stretch in your upper, middle, and lower back.  Hold this stretch for as long as it feels comfortable, or about 15 to 30 seconds. 3. Return to the starting position with a flat back while you are on your hands and knees. 4. Let your back sway by pressing your stomach toward the floor. Lift your buttocks toward the ceiling. 5. Hold this position for 15 to 30 seconds. 6. Repeat 2 to 4 times. Follow-up care is a key part of your treatment and safety. Be sure to make and go to all appointments, and call your doctor if you are having problems. It's also a good idea to know your test results and keep a list of the medicines you take. Where can you learn more? Go to https://[employer]au[employer]al Films.[employer]. org and sign in to your Fisgo account. Enter X165 in the [facility] box to learn more about \"Low Back Arthritis: Exercises. \"     If you do not have an account, please click on the \"Sign Up Now\" link. Current as of: November 16, 2020               Content Version: 12.9  © 2006-2021 Healthwise, [employer]. Care instructions adapted under license by [facility] ([facility]). If you have questions about a medical condition or this instruction, always ask your healthcare professional. [place] [address] any warranty or liability for your use of this information. Patient Education        Low Back Pain: Exercises  Introduction  Here are some examples of exercises for you to try. The exercises may be suggested for a condition or for rehabilitation. Start each exercise slowly. Ease off the exercises if you start to have pain. You will be told when to start these exercises and which ones will work best for you. How to do the exercises  Press-up   1. Lie on your stomach, supporting your body with your forearms. 2. Press your elbows down into the floor to raise your upper back. As you do this, relax your stomach muscles and allow your back to arch without using your back muscles. As your press up, do not let your hips or pelvis come off the floor.   3. Hold for 15 to 30 seconds, then relax. 4. Repeat 2 to 4 times. Alternate arm and leg (bird dog) exercise   Do this exercise slowly. Try to keep your body straight at all times, and do not let one hip drop lower than the other. 1. Start on the floor, on your hands and knees. 2. Tighten your belly muscles. 3. Raise one leg off the floor, and hold it straight out behind you. Be careful not to let your hip drop down, because that will twist your trunk. 4. Hold for about 6 seconds, then lower your leg and switch to the other leg. 5. Repeat 8 to 12 times on each leg. 6. Over time, work up to holding for 10 to 30 seconds each time. 7. If you feel stable and secure with your leg raised, try raising the opposite arm straight out in front of you at the same time. Knee-to-chest exercise   1. Lie on your back with your knees bent and your feet flat on the floor. 2. Bring one knee to your chest, keeping the other foot flat on the floor (or keeping the other leg straight, whichever feels better on your lower back). 3. Keep your lower back pressed to the floor. Hold for at least 15 to 30 seconds. 4. Relax, and lower the knee to the starting position. 5. Repeat with the other leg. Repeat 2 to 4 times with each leg. 6. To get more stretch, put your other leg flat on the floor while pulling your knee to your chest.    Curl-ups   1. Lie on the floor on your back with your knees bent at a 90-degree angle. Your feet should be flat on the floor, about 12 inches from your buttocks. 2. Cross your arms over your chest. If this bothers your neck, try putting your hands behind your neck (not your head), with your elbows spread apart. 3. Slowly tighten your belly muscles and raise your shoulder blades off the floor. 4. Keep your head in line with your body, and do not press your chin to your chest.  5. Hold this position for 1 or 2 seconds, then slowly lower yourself back down to the floor. 6. Repeat 8 to 12 times.     Pelvic tilt exercise 1. Lie on your back with your knees bent. 2. \"Brace\" your stomach. This means to tighten your muscles by pulling in and imagining your belly button moving toward your spine. You should feel like your back is pressing to the floor and your hips and pelvis are rocking back. 3. Hold for about 6 seconds while you breathe smoothly. 4. Repeat 8 to 12 times. Heel dig bridging   1. Lie on your back with both knees bent and your ankles bent so that only your heels are digging into the floor. Your knees should be bent about 90 degrees. 2. Then push your heels into the floor, squeeze your buttocks, and lift your hips off the floor until your shoulders, hips, and knees are all in a straight line. 3. Hold for about 6 seconds as you continue to breathe normally, and then slowly lower your hips back down to the floor and rest for up to 10 seconds. 4. Do 8 to 12 repetitions. Hamstring stretch in doorway   1. Lie on your back in a doorway, with one leg through the open door. 2. Slide your leg up the wall to straighten your knee. You should feel a gentle stretch down the back of your leg. 3. Hold the stretch for at least 15 to 30 seconds. Do not arch your back, point your toes, or bend either knee. Keep one heel touching the floor and the other heel touching the wall. 4. Repeat with your other leg. 5. Do 2 to 4 times for each leg. Hip flexor stretch   1. Kneel on the floor with one knee bent and one leg behind you. Place your forward knee over your foot. Keep your other knee touching the floor. 2. Slowly push your hips forward until you feel a stretch in the upper thigh of your rear leg. 3. Hold the stretch for at least 15 to 30 seconds. Repeat with your other leg. 4. Do 2 to 4 times on each side. Wall sit   1. Stand with your back 10 to 12 inches away from a wall. 2. Lean into the wall until your back is flat against it.   3. Slowly slide down until your knees are slightly bent, pressing your lower back into the wall. 4. Hold for about 6 seconds, then slide back up the wall. 5. Repeat 8 to 12 times. Follow-up care is a key part of your treatment and safety. Be sure to make and go to all appointments, and call your doctor if you are having problems. It's also a good idea to know your test results and keep a list of the medicines you take. Where can you learn more? Go to https://Kelso TechnologiespeIncentiveeb.[employer]. org and sign in to your Kutenda account. Enter P619 in the [employer] box to learn more about \"Low Back Pain: Exercises. \"     If you do not have an account, please click on the \"Sign Up Now\" link. Current as of: November 16, 2020               Content Version: 12.9  © 2006-2021 Healthwise, Incorporated. Care instructions adapted under license by [facility] ([facility]). If you have questions about a medical condition or this instruction, always ask your healthcare professional. [place] [address] any warranty or liability for your use of this information.          Notes requested from Cardiology

## 2021-06-23 NOTE — PROGRESS NOTES
Stop Metformin. Start statin  Follow up with cardiology. Lisinopril increased  Labs next visit  Attending Physician Statement  I have discussed the care of Governor Stu, including pertinent history and exam findings,  with the resident. I have reviewed the key elements of all parts of the encounter with the resident. I agree with the assessment, plan and orders as documented by the resident.   (GE Modifier)

## 2021-06-28 ENCOUNTER — OFFICE VISIT (OUTPATIENT)
Dept: PODIATRY | Age: 61
End: 2021-06-28
Payer: MEDICAID

## 2021-06-28 VITALS
HEART RATE: 78 BPM | BODY MASS INDEX: 27.72 KG/M2 | WEIGHT: 216 LBS | HEIGHT: 74 IN | DIASTOLIC BLOOD PRESSURE: 80 MMHG | SYSTOLIC BLOOD PRESSURE: 130 MMHG

## 2021-06-28 DIAGNOSIS — E11.9 TYPE 2 DIABETES MELLITUS WITHOUT COMPLICATION, WITHOUT LONG-TERM CURRENT USE OF INSULIN (HCC): Primary | ICD-10-CM

## 2021-06-28 PROCEDURE — G8417 CALC BMI ABV UP PARAM F/U: HCPCS | Performed by: STUDENT IN AN ORGANIZED HEALTH CARE EDUCATION/TRAINING PROGRAM

## 2021-06-28 PROCEDURE — 3046F HEMOGLOBIN A1C LEVEL >9.0%: CPT | Performed by: STUDENT IN AN ORGANIZED HEALTH CARE EDUCATION/TRAINING PROGRAM

## 2021-06-28 PROCEDURE — 2022F DILAT RTA XM EVC RTNOPTHY: CPT | Performed by: STUDENT IN AN ORGANIZED HEALTH CARE EDUCATION/TRAINING PROGRAM

## 2021-06-28 PROCEDURE — 3017F COLORECTAL CA SCREEN DOC REV: CPT | Performed by: STUDENT IN AN ORGANIZED HEALTH CARE EDUCATION/TRAINING PROGRAM

## 2021-06-28 PROCEDURE — 99212 OFFICE O/P EST SF 10 MIN: CPT | Performed by: STUDENT IN AN ORGANIZED HEALTH CARE EDUCATION/TRAINING PROGRAM

## 2021-06-28 PROCEDURE — G8427 DOCREV CUR MEDS BY ELIG CLIN: HCPCS | Performed by: STUDENT IN AN ORGANIZED HEALTH CARE EDUCATION/TRAINING PROGRAM

## 2021-06-28 PROCEDURE — 1036F TOBACCO NON-USER: CPT | Performed by: STUDENT IN AN ORGANIZED HEALTH CARE EDUCATION/TRAINING PROGRAM

## 2021-06-28 NOTE — PROGRESS NOTES
[address], [address] [place] [address]  Tel: [phone]   Fax: [phone]    Subjective     CC: Diabetic foot exam and painful and elongated toe nails    HPI:  Zohreh Crowley is a 56y.o. year old male who presents to clinic today for         diabetic foot examination and also complaining of painful and elongated toenails. The patient is a diabetic, and their last HgbA1c was 5.8% in (2020). The patient states their digits are painful when the toenails are elongated, causing rubbing in shoe gear. The patient denies tingling and numbness in the lower extremities. Patient denies any rest pain or claudication symptoms. The patient denies any history of acute trauma. The patient denies any other pedal complaints. The patient states that he was given the prescription for diabetic shoes approximately a year ago however they were lost so he would like a new prescription today. Primary care physician is Chapincito Mendez MD.    ROS:    Constitutional: Denies nausea, vomiting, fever, chills. Neurologic: - numbness, tingling, and burning in the feet. Vascular: Denies symptoms of lower extremity claudication. Skin: Denies open wounds. Otherwise negative except as noted in the HPI.      PMH:  Past Medical History:   Diagnosis Date    Alcoholic ([facility] 3/85/3135    Cocaine dependence ([facility] 2021    Diabetes mellitus ([facility]     GERD (gastroesophageal reflux disease)     Hypertension     Rheumatoid arthritis(714.0)     newly diagnosed    Wears glasses        Surgical History:   Past Surgical History:   Procedure Laterality Date    COLONOSCOPY  2012    FOOT SURGERY      Left foot surgery HDS 2-4 and bunion        Social History:  Social History     Tobacco Use    Smoking status: Former Smoker     Packs/day: 0.50     Years: 20.00     Pack years: 10.00     Quit date: 2018     Years since quittin.8    Smokeless tobacco: Never Used   Substance Use Topics    Alcohol use: No     Alcohol/week: 0.0 standard drinks    Drug use: No       Medications:  Prior to Admission medications    Medication Sig Start Date End Date Taking? Authorizing Provider   lisinopril (PRINIVIL;ZESTRIL) 10 MG tablet Take 1 tablet by mouth daily 6/23/21 8/22/21 Yes Marley Allen MD   tamsulosin [facility]) 0.4 MG capsule Take 1 capsule by mouth daily 6/23/21 8/22/21 Yes Marley Allen MD   oxybutynin (DITROPAN XL) 10 MG extended release tablet Take 1 tablet by mouth daily 6/23/21 7/23/21 Yes Marley Allen MD   lidocaine (XYLOCAINE) 5 % ointment Apply topically 2 times daily as needed for Pain Apply topically as needed. 6/23/21  Yes Marley Allen MD   atorvastatin (LIPITOR) 40 MG tablet Take 1 tablet by mouth nightly 6/23/21 8/22/21 Yes Marley Allen MD   omeprazole (PRILOSEC) 10 MG delayed release capsule Take 2 capsules by mouth daily 6/22/21  Yes Neville Schuster MD   apixaban (ELIQUIS) 5 MG TABS tablet take 1 tablet by mouth twice a day 6/22/21  Yes Neville Schuster MD   Misc. Devices MISC Dispense 1 pair of dm shoes with 3 accommodated inserts 3/22/21  Yes Camila Sanford DPM   docusate sodium (COLACE) 100 MG capsule Take 1 capsule by mouth daily as needed for Constipation  Patient taking differently: Take 100 mg by mouth as needed for Constipation  11/17/20  Yes Jeniffer Moore MD   amLODIPine (NORVASC) 10 MG tablet Take 1 tablet by mouth daily 11/17/20  Yes Jeniffer Moore MD   metoprolol tartrate (LOPRESSOR) 50 MG tablet Take 1 tablet by mouth 2 times daily 11/17/20  Yes Jeniffer Moore MD   urea 10 % lotion Apply topically as needed. 1/15/20  Yes Marley Allen MD       Objective     Vitals:    06/28/21 1442   BP: 130/80   Pulse: 78       Lab Results   Component Value Date    LABA1C 5.8 11/17/2020       Physical Exam:  General:  Alert and oriented x3. In no acute distress.      Lower Extremity Physical Exam:    Vascular: DP and PT pulses are palpable, Bilateral. CFT <3 seconds to all digits, Bilateral.  No edema, Bilateral.  Hair growth is absent to the level of the digits, Bilateral.     Neuro: Saph/sural/SP/DP/plantar sensation intact to light touch. Protective sensation is intact to 10/10 sites as tested with a 5.07g SWMF, Bilateral.     Musculoskeletal: EHL/FHL/GS/TA gross motor intact. TTP to distal digits b/l. Gross deformity is absent, Bilateral.     Dermatologic: No open lesions, Bilateral.  Interdigital maceration absent, Bilateral. Nails 1-5 bilateral thickened and elongated with subungual debris noted. Focal hyperkeratotic tissue noted subsecond and third metatarsal heads on the right foot. Assessment   Violet Shepard is a 56 y.o. male with     Diagnosis Orders   1. Type 2 diabetes mellitus without complication, without long-term current use of insulin ([facility])  HM DIABETES FOOT EXAM        Plan   · Patient examined and evaluated. · Diagnosis and treatment options discussed in detail. · Mycotic nails 1-10 debrided sharply with sterile nail nippers without incident. · Focal hyperkeratotic tissue subsecond/third metatarsal head debrided sharply with a #15 blade down and into the level of the dermis. Patient tolerated without incident. No hemostasis required. · Patient was educated on the utmost importance of tight glycemic control. · Patient was advised to continue daily foot checks, in addition to not ambulating barefoot. · Dispensed prescription for diabetic shoes along with list of vendors. · We will fax over form for PCP authorization. · Patient to RTC in 3 months. · Please, call the office with any questions or concerns. No orders of the defined types were placed in this encounter. No orders of the defined types were placed in this encounter.       Noman Wong DPM   Podiatric Medicine & Surgery   6/28/2021 at 2:46 PM

## 2021-06-28 NOTE — PROGRESS NOTES
[address], [facility]  Tel: [phone]   Fax: [phone]    Subjective     CC: Diabetic foot exam and painful and elongated toe nails    HPI:  Oral Monge is a 56y.o. year old male who presents to the clinic for callus on his both feet. Pt states he is not interested in nail debridement and as he is able to trim them on his own. His last HgbA1c was 5.8 % 2020. Patient denies any rest pain and claudication. Patient denies any history of acute trauma. Patient denies any tingling and numbness in the lower extremities. Patient denies any other pedal complaints. Pt denies any n/v/f/c/sob    Primary care physician is Marie Asencio MD.    ROS:    Constitutional: Denies nausea, vomiting, fever, chills. Neurologic: numbness, tingling, and burning in the feet. Vascular: Denies symptoms of lower extremity claudication. Skin: Denies open wounds. Otherwise negative except as noted in the HPI. PMH:  Past Medical History:   Diagnosis Date    Alcoh[facility] ([facility]     Cocaine dependence ([facility] 2021    Diabetes mellitus ([facility]     GERD (gastroesophageal reflux disease)     Hypertension     Rheumatoid arthritis(714.0)     newly diagnosed    Wears glasses        Surgical History:   Past Surgical History:   Procedure Laterality Date    COLONOSCOPY  2012    FOOT SURGERY      Left foot surgery HDS 2-4 and bunion        Social History:  Social History     Tobacco Use    Smoking status: Former Smoker     Packs/day: 0.50     Years: 20.00     Pack years: 10.00     Quit date: 2018     Years since quittin.8    Smokeless tobacco: Never Used   Substance Use Topics    Alcohol use: No     Alcohol/week: 0.0 standard drinks    Drug use: No       Medications:  Prior to Admission medications    Medication Sig Start Date End Date Taking?  Authorizing Provider   lisinopril (PRINIVIL;ZESTRIL) 10 MG tablet Take 1 tablet by mouth daily 6/23/21 8/22/21 Yes Jose Pastor MD   tamsulosin [facility]) 0.4 MG capsule Take 1 capsule by mouth daily 6/23/21 8/22/21 Yes Jose Pastor MD   oxybutynin (DITROPAN XL) 10 MG extended release tablet Take 1 tablet by mouth daily 6/23/21 7/23/21 Yes Jose Pastor MD   lidocaine (XYLOCAINE) 5 % ointment Apply topically 2 times daily as needed for Pain Apply topically as needed. 6/23/21  Yes Jose Pastor MD   atorvastatin (LIPITOR) 40 MG tablet Take 1 tablet by mouth nightly 6/23/21 8/22/21 Yes Jose Pastor MD   omeprazole (PRILOSEC) 10 MG delayed release capsule Take 2 capsules by mouth daily 6/22/21  Yes Santiago Villagomez MD   apixaban (ELIQUIS) 5 MG TABS tablet take 1 tablet by mouth twice a day 6/22/21  Yes Santiago Villagomez MD   Misc. Devices MISC Dispense 1 pair of dm shoes with 3 accommodated inserts 3/22/21  Yes Fortino Purcell DPM   docusate sodium (COLACE) 100 MG capsule Take 1 capsule by mouth daily as needed for Constipation  Patient taking differently: Take 100 mg by mouth as needed for Constipation  11/17/20  Yes Cynthia Brian MD   amLODIPine (NORVASC) 10 MG tablet Take 1 tablet by mouth daily 11/17/20  Yes Cynthia Brian MD   metoprolol tartrate (LOPRESSOR) 50 MG tablet Take 1 tablet by mouth 2 times daily 11/17/20  Yes Cynthia Brian MD   urea 10 % lotion Apply topically as needed. 1/15/20  Yes Jose Pastor MD       Objective     Vitals:    06/28/21 1442   BP: 130/80   Pulse: 78       Lab Results   Component Value Date    LABA1C 5.8 11/17/2020       Physical Exam:  General:  Alert and oriented x3. In no acute distress. Lower Extremity Physical Exam:    Vascular: DP and PT pulses are palpable, Bilateral. CFT <3 seconds to all digits, Bilateral.  No edema, Bilateral.  Hair growth is absent to the level of the digits, Bilateral.     Neuro: Saph/sural/SP/DP/plantar sensation intact to light touch.  Protective sensation is intact to 10/10 sites as tested with a 5.07g SWMF, Bilateral.     Musculoskeletal: EHL/FHL/GS/TA gross motor intact. TTP to distal digits b/l. Gross deformity is absent, Bilateral.     Dermatologic: No open lesions, Bilateral.  Interdigital maceration absent, Bilateral. Nails 1-5 bilateral thickened and elongated with subungual debris noted. Focal hyperkeratotic tissue noted subsecond and third metatarsal heads on the right foot. Assessment   Kwasi Cerna is a 56 y.o. male with     Diagnosis Orders   1. Type 2 diabetes mellitus without complication, without long-term current use of insulin ([facility]   DIABETES FOOT EXAM        Plan   Patient examined and evaluated. Diagnosis and treatment options discussed in detail. Focal hyperkeratotic tissue subsecond/third metatarsal head debrided sharply with a #15 blade down and into the level of the dermis. Patient tolerated without incident. No hemostasis required. Patient was advised to continue daily foot checks, in addition to not ambulating barefoot. Patient to RTC prn. Please, call the office with any questions or concerns. Pt Seen and evaluated by Dr. Pérez Bain. No orders of the defined types were placed in this encounter. No orders of the defined types were placed in this encounter.       Marjorie Seo DPM   Podiatric Medicine & Surgery   6/28/2021 at 3:31 PM

## 2021-06-28 NOTE — PROGRESS NOTES
Patient instructed to remove shoes and socks and instructed to sit in exam chair. Current PCP is Florencio Bhagat MD and date of last visit was 6/23/21. Do you have a follow up visit scheduled? No  If yes, the date is     Diabetic visit information    Blood pressure (Control is BP <140/90)  BP Readings from Last 3 Encounters:   06/23/21 (!) 153/78   03/22/21 137/87   03/09/21 131/72       BP taken with correct size cuff? - Yes   Repeated if > 140/90 NA      Tobacco use:  Patient  reports that he quit smoking about 2 years ago. He has a 10.00 pack-year smoking history. He has never used smokeless tobacco.  If Smoker - Cessation materials given? - Yes       Diabetic Health Maintenance Items due  Diabetes Management   Topic Date Due    Diabetic retinal exam  Never done       Diabetic retinal exam done in last year? - Yes   If No: remind patient that it is due and they should schedule an exam    Medications  Is patient taking any medications for diabetes? -   Yes  Have blood sugars been controlled? Fasting blood sugars under 120   -   Yes   Random home sugars or today's POCT glucose is under 180 -   Yes   []  If No to the above then patient should schedule appt with PCP. Diabetic Plan    A1C Plan  Lab Results   Component Value Date    LABA1C 5.8 11/17/2020    LABA1C 5.9 10/09/2019    LABA1C 5.6 07/01/2019      []  If A1C over 8 and last result >3 months ago - Order A1C and refer to PCP   []  If last A1C over 6 months ago - Order A1C and refer to PCP for follow up   []  If elevated blood sugars > 180 - refer to PCP for follow up    []  Blood sugar controlled - A1C under 8 and last check was < 6 months      Cholesterol Plan   Lab Results   Component Value Date    LDLCHOLESTEROL 60 04/27/2020      []  If LDL > 100 and last result >3 months ago - order Fasting lipids and refer to PCP for follow up   []  If LDL < 100 and over 1 year ago - Order Fasting lipids and refer to PCP for follow up   [] LDL is controlled.   LDL < 100 and checked within the last year     Blood Pressure  BP Readings from Last 3 Encounters:   06/23/21 (!) 153/78   03/22/21 137/87   03/09/21 131/72      []  If SBP >140 mmhg - refer to PCP for follow up   []  If DBP > 90 mmhg - refer to PCP for follow up   [] BP is controlled <140/90     Order labs as PCP ordered.   (ie: Lipids, A1C, CMP)

## 2021-08-17 DIAGNOSIS — N40.1 BENIGN PROSTATIC HYPERPLASIA WITH INCOMPLETE BLADDER EMPTYING: ICD-10-CM

## 2021-08-17 DIAGNOSIS — R39.14 BENIGN PROSTATIC HYPERPLASIA WITH INCOMPLETE BLADDER EMPTYING: ICD-10-CM

## 2021-08-17 DIAGNOSIS — I10 ESSENTIAL HYPERTENSION: ICD-10-CM

## 2021-08-18 RX ORDER — LISINOPRIL 10 MG/1
10 TABLET ORAL DAILY
Qty: 30 TABLET | Refills: 1 | Status: SHIPPED | OUTPATIENT
Start: 2021-08-18 | End: 2021-10-11

## 2021-08-18 RX ORDER — OXYBUTYNIN CHLORIDE 10 MG/1
10 TABLET, EXTENDED RELEASE ORAL DAILY
Qty: 30 TABLET | Refills: 1 | Status: SHIPPED | OUTPATIENT
Start: 2021-08-18 | End: 2021-10-11

## 2021-08-18 NOTE — TELEPHONE ENCOUNTER
Request for Ditropan and Lisinopril. Next Visit Date:  Future Appointments   Date Time Provider Sheldon Cha   9/7/2021  2:00 PM Colonel Martha MD [id number]   10/4/2021  2:45 PM Abby Lozoya DPM [facility] Maintenance   Topic Date Due    Diabetic retinal exam  Never done    Lipid screen  12/02/2021 (Originally 4/27/2021)    Shingles Vaccine (2 of 2) 08/19/2021    Flu vaccine (1) 09/01/2021    A1C test (Diabetic or Prediabetic)  11/17/2021    Potassium monitoring  12/07/2021    Creatinine monitoring  12/07/2021    Diabetic foot exam  12/14/2021    Diabetic microalbuminuria test  03/15/2022    Colon cancer screen fecal DNA test (Cologuard)  01/29/2023    Pneumococcal 0-64 years Vaccine (2 of 2 - PPSV23) 08/30/2023    DTaP/Tdap/Td vaccine (2 - Td or Tdap) 09/13/2026    COVID-19 Vaccine  Completed    Hepatitis C screen  Completed    HIV screen  Completed    Hepatitis A vaccine  Aged Out    Hib vaccine  Aged Out    Meningococcal (ACWY) vaccine  Aged Out       Hemoglobin A1C (%)   Date Value   11/17/2020 5.8   10/09/2019 5.9   07/01/2019 5.6             ( goal A1C is < 7)   Microalb/Crt.  Ratio (mcg/mg creat)   Date Value   03/15/2021 CANNOT BE CALCULATED     LDL Cholesterol (mg/dL)   Date Value   04/27/2020 60       (goal LDL is <100)   AST (U/L)   Date Value   04/09/2019 27     ALT (U/L)   Date Value   04/09/2019 28     BUN (mg/dL)   Date Value   12/07/2020 15     BP Readings from Last 3 Encounters:   06/28/21 130/80   06/23/21 (!) 153/78   03/22/21 137/87          (goal 120/80)    All Future Testing planned in [facility]  Lab Frequency Next Occurrence   PSA, Diagnostic Once 09/18/2021         Patient Active Problem List:     S/P hammer toe correction     Pre-diabetes     Gastroesophageal reflux disease     Essential hypertension     Smoker     A-fib (HCC)     Cataract     Primary osteoarthritis of both knees     Chronic midline low back pain without sciatica Benign prostatic hyperplasia with incomplete bladder emptying

## 2021-09-07 ENCOUNTER — OFFICE VISIT (OUTPATIENT)
Dept: INTERNAL MEDICINE | Age: 61
End: 2021-09-07
Payer: MEDICAID

## 2021-09-07 VITALS
TEMPERATURE: 97.3 F | BODY MASS INDEX: 24.85 KG/M2 | HEART RATE: 56 BPM | WEIGHT: 193.6 LBS | HEIGHT: 74 IN | DIASTOLIC BLOOD PRESSURE: 63 MMHG | SYSTOLIC BLOOD PRESSURE: 130 MMHG

## 2021-09-07 DIAGNOSIS — R39.14 BENIGN PROSTATIC HYPERPLASIA WITH INCOMPLETE BLADDER EMPTYING: ICD-10-CM

## 2021-09-07 DIAGNOSIS — R73.03 PRE-DIABETES: ICD-10-CM

## 2021-09-07 DIAGNOSIS — I10 ESSENTIAL HYPERTENSION: Primary | ICD-10-CM

## 2021-09-07 DIAGNOSIS — M17.0 PRIMARY OSTEOARTHRITIS OF BOTH KNEES: ICD-10-CM

## 2021-09-07 DIAGNOSIS — M79.18 PAIN OF PARASPINAL MUSCLE: ICD-10-CM

## 2021-09-07 DIAGNOSIS — F17.200 SMOKER: ICD-10-CM

## 2021-09-07 DIAGNOSIS — N40.1 BENIGN PROSTATIC HYPERPLASIA WITH INCOMPLETE BLADDER EMPTYING: ICD-10-CM

## 2021-09-07 LAB — HBA1C MFR BLD: 5.8 %

## 2021-09-07 PROCEDURE — 3017F COLORECTAL CA SCREEN DOC REV: CPT | Performed by: STUDENT IN AN ORGANIZED HEALTH CARE EDUCATION/TRAINING PROGRAM

## 2021-09-07 PROCEDURE — G8427 DOCREV CUR MEDS BY ELIG CLIN: HCPCS | Performed by: STUDENT IN AN ORGANIZED HEALTH CARE EDUCATION/TRAINING PROGRAM

## 2021-09-07 PROCEDURE — 99211 OFF/OP EST MAY X REQ PHY/QHP: CPT | Performed by: INTERNAL MEDICINE

## 2021-09-07 PROCEDURE — G8420 CALC BMI NORM PARAMETERS: HCPCS | Performed by: STUDENT IN AN ORGANIZED HEALTH CARE EDUCATION/TRAINING PROGRAM

## 2021-09-07 PROCEDURE — 4004F PT TOBACCO SCREEN RCVD TLK: CPT | Performed by: STUDENT IN AN ORGANIZED HEALTH CARE EDUCATION/TRAINING PROGRAM

## 2021-09-07 PROCEDURE — 99213 OFFICE O/P EST LOW 20 MIN: CPT | Performed by: STUDENT IN AN ORGANIZED HEALTH CARE EDUCATION/TRAINING PROGRAM

## 2021-09-07 PROCEDURE — 83036 HEMOGLOBIN GLYCOSYLATED A1C: CPT | Performed by: STUDENT IN AN ORGANIZED HEALTH CARE EDUCATION/TRAINING PROGRAM

## 2021-09-07 RX ORDER — LIDOCAINE 50 MG/G
OINTMENT TOPICAL 2 TIMES DAILY PRN
Qty: 1 EACH | Refills: 5 | Status: SHIPPED | OUTPATIENT
Start: 2021-09-07 | End: 2022-03-23

## 2021-09-07 NOTE — PROGRESS NOTES
[facility] [address] [address]  Dept: [phone]  Dept Fax: [phone]    Office Progress/Follow Up Note  Date of patient's visit: 9/7/2021  Patient's Name:  Marry Fink YOB: 1960            Patient Care Team:  Madison Oliver MD as PCP - General (Internal Medicine)  AUDI Simmons CNP as PCP - [facility]  ________________________________________________________________________      Reason for Visit: Routine outpatient follow up  ________________________________________________________________________  Chief Complaint:  Follow-up, Hypertension (medication taken today), Diabetes (a1c running), and Health Maintenance (Eye exam has appt in November - [place] in [facility] , Shingrix due for second waiting on pharmacy )    ________________________________________________________________________  History of Presenting Illness:  History was obtained from: patient, electronic medical record. Marry Fink is a 59 y.o. is here for:     Essential hypertension-blood pressure 130/63 with heart rate 56. Patient is currently on Norvasc, lisinopril, Lopressor and took the medication today. Patient denies any headaches, blurry vision, dizziness.  Patient has prediabetes with HbA1c of 5.9 in November 2020 and was on Metformin. However patient's Metformin was discontinued during last visit because of old age. Patient's HbA1c today 5.8   Hyperlipidemia with ASCVD risk 28%-Lipitor was started on last visit. Patient states he is doing well and has no side effects with the medication.  No new complaints.  Knee pain and back pain better than last time.     Previous visit  · Chronic knee pain because of osteoarthritis of the knees bilaterally  · Chronic back pain  · GERD-on Prilosec  · Alcohol abuse- occasionally  · Smoker- 2-3 cigarrettes  · Cocaine use in December  · Follows up with ophthalmology at [place], last appointment in February. Coming appointment November  · Last echo 2019-EF 62% with normal diastolic function. Mild MR, RVSP 25  · Last EKG in 2019-was within normal limits  · A. fib diagnosed in May 2019-on Lopressor and Eliquis, denies any dark tarry stools, unsure why he is not on aspirin. Patient advised to see his cardiologist for follow up  · Needs to see his urologist as part of his yearly follow up  · Seen by podiatrist on June 2021    Health Maintenance:  · Has an upcomming colonoscopy. Patient had cologuard done in 2020 and was positive. Had a colonoscopy in the past in 2012, no polyps at that time  · covid vaccine done  · Shingles vaccine  · Patient will need abdominal aortic aneurysm screen at the age of 70. Patient Active Problem List   Diagnosis    S/P hammer toe correction    Pre-diabetes    Gastroesophageal reflux disease    Essential hypertension    Smoker    A-fib ([facility] [address]    Cataract    Primary osteoarthritis of both knees    Chronic midline low back pain without sciatica    Benign prostatic hyperplasia with incomplete bladder emptying       Allergies   Allergen Reactions    Motrin [Ibuprofen Micronized] Anaphylaxis     Swelling to throat         Current Outpatient Medications   Medication Sig Dispense Refill    atorvastatin (LIPITOR) 40 MG tablet TAKE 1 TABLET BY MOUTH NIGHTLY 60 tablet 1    oxybutynin (DITROPAN-XL) 10 MG extended release tablet TAKE 1 TABLET BY MOUTH DAILY 30 tablet 1    lisinopril (PRINIVIL;ZESTRIL) 10 MG tablet TAKE 1 TABLET BY MOUTH DAILY 30 tablet 1    tamsulosin (FLOMAX) 0.4 MG capsule Take 1 capsule by mouth daily 60 capsule 0    lidocaine (XYLOCAINE) 5 % ointment Apply topically 2 times daily as needed for Pain Apply topically as needed.  1 Tube 1    omeprazole (PRILOSEC) 10 MG delayed release capsule Take 2 capsules by mouth daily 90 capsule 3    apixaban (ELIQUIS) 5 MG TABS tablet take 1 tablet by mouth twice a day 180 tablet 1    docusate sodium (COLACE) 100 MG capsule Take 1 capsule by mouth daily as needed for Constipation (Patient taking differently: Take 100 mg by mouth as needed for Constipation ) 30 capsule 5    amLODIPine (NORVASC) 10 MG tablet Take 1 tablet by mouth daily 90 tablet 3    metoprolol tartrate (LOPRESSOR) 50 MG tablet Take 1 tablet by mouth 2 times daily 180 tablet 3    urea 10 % lotion Apply topically as needed. 1 Bottle 1     No current facility-administered medications for this visit.        Social History     Tobacco Use    Smoking status: Current Every Day Smoker     Packs/day: 0.25     Years: 20.00     Pack years: 5.00     Types: Cigarettes    Smokeless tobacco: Never Used    Tobacco comment: 2-3 per day    Substance Use Topics    Alcohol use: No     Alcohol/week: 0.0 standard drinks    Drug use: No       Family History   Problem Relation Age of Onset    High Blood Pressure Mother       ________________________________________________________________________  Review of Systems:  CONSTITUTIONAL: Denies: fever, chills  PSYCH: Denies: anxiety, depression  ALLERGIES: Denies: urticaria  EYES: Denies: blurry vision, decreased vision, photophobia  ENT: Denies: sore throat, nasal congestion  CARDIOVASCULAR: Denies: chest pain, dyspnea on exertion  RESPIRATORY: Denies: cough, hemoptysis, shortness of breath  GI: Denies: Denies: abdominal pain, flank pain  : Denies: Denies: dysuria, frequency/urgency  NEURO: Denies: dizzy/vertigo, headache  MUSCULOSKELETAL: Denies: back pain, joint pain  SKIN: Denies: rash, itching  ________________________________________________________________________  Physical Exam:  Vitals:    09/07/21 1338   BP: 130/63   Pulse: 56   Temp: 97.3 °F (36.3 °C)   TempSrc: Temporal   Weight: 193 lb 9.6 oz (87.8 kg)   Height: 6' 2\" (1.88 m)     BP Readings from Last 3 Encounters:   09/07/21 130/63   06/28/21 130/80   06/23/21 (!) 153/78         General Examination    General Resting comfortably    Head Normocephalic, without obvious abnormality   Neck Supple, symmetrical. Good ROM. No midline or paraspinal tenderness. Lungs Respirations unlabored, no wheezing   Chest Wall No deformity   Heart RRR, no murmur       Extremities No cyanosis or edema or warmth, mild tenderness of left knee more medial than later           ________________________________________________________________________  Diagnostic findings:  CBC:  Lab Results   Component Value Date    WBC 4.0 12/07/2020    HGB 13.3 12/07/2020     12/07/2020       BMP:    Lab Results   Component Value Date     12/07/2020    K 4.3 12/07/2020     12/07/2020    CO2 22 12/07/2020    BUN 15 12/07/2020    CREATININE 1.15 12/07/2020    GLUCOSE 96 12/07/2020    GLUCOSE 89 02/02/2012       HEMOGLOBIN A1C:   Lab Results   Component Value Date    LABA1C 5.8 09/07/2021       FASTING LIPID PANEL:  Lab Results   Component Value Date    CHOL 122 04/09/2019    HDL 43 04/27/2020    TRIG 132 04/09/2019     ________________________________________________________________________  Health Maintenance:  Health Maintenance Due   Topic Date Due    Diabetic retinal exam  Never done    Shingles Vaccine (2 of 2) 08/19/2021    Flu vaccine (1) 09/01/2021     ________________________________________________________________________  Assessment and Plan:    1. Essential hypertension  -Continue Norvasc, lisinopril, Lopressor at same doses    2. Pre-diabetes  -Off Metformin  - POCT glycosylated hemoglobin (Hb A1C)-5.7 today  -Advised diet and lifestyle modifications    3. Benign prostatic hyperplasia with incomplete bladder emptying  -Continue Flomax  -Advised to follow-up with urologist as part of yearly checkup. 4. Pain of paraspinal muscle  -Improved since last time.  -Encouraged exercise. - lidocaine (XYLOCAINE) 5 % ointment; Apply topically 2 times daily as needed for Pain Apply topically as needed. Dispense: 1 each; Refill: 5    5.  Primary osteoarthritis of both knees-left worse than right  -Stable  -Allergic reaction to Motrin in the past and does not want to take NSAIDs  -Advised patient to continue exercising.  -Discussed about intra-articular injection-patient does not want it. Patient advised to follow-up with GI for colonoscopy, urologist as part of yearly checkups, cardiologist.  Patient has an upcoming appointment with his ophthalmologist at [place], is due for his shingles vaccine second dose and flu vaccine. Patient also counseled about smoking cessation. ________________________________________________________________________  Follow up and instructions:  · Follow up in 6 months for hypertension, patient will need labs at that time. · Kristina Crawford received counseling on the following healthy behaviors: medication compliance, diet and exercise    · Discussed use, benefit, and side effects of prescribed medications. Barriers to medication compliance addressed. All patient questions answered. Pt voiced understanding. · Patient given educational materials - see patient instructions    Geovanna Howard MD  Internal Medicine Resident  [facility]  9/7/2021 2:13 PM   Attending Physician Statement  I have discussed the care of Cata Jaramillo, including pertinent history and exam findings,  with the resident. I have reviewed the key elements of all parts of the encounter with the resident. I agree with the assessment, plan and orders as documented by the resident. (GE Modifier)    Urged smoking cessation. Urged to get cardiology appt for clearance for colonoscopy---gave him appropriate phone numbers. This note is created with the assistance of a speech-recognition program. While intending to generate a document that actually reflects the content of the visit, the document can still have some mistakes which may not have been identified and corrected by editing.

## 2021-09-07 NOTE — PATIENT INSTRUCTIONS
Return To Clinic around 3/7/2022 for 6 month follow up. Patient was added to wait list. Patient will be contacted by office to schedule upcoming appointment with the physician. After Visit Summary given and reviewed. The medication list included in this document is our record of what you are currently taking, including any changes that were made at today's visit. If you find any differences when compared to your medications at home, or have any questions that were not answered at your visit, please contact the office. It is very important for your care that you keep your appointment. If for some reason you are unable to keep your appointment, it is equally important that you call our office at [phone] to cancel your appointment and reschedule. Failure to do so may result in your termination from our practice. Medications have been e-scribed to pharmacy of patients choice.      -COREY Chapa

## 2021-10-11 DIAGNOSIS — R39.14 BENIGN PROSTATIC HYPERPLASIA WITH INCOMPLETE BLADDER EMPTYING: ICD-10-CM

## 2021-10-11 DIAGNOSIS — N40.1 BENIGN PROSTATIC HYPERPLASIA WITH INCOMPLETE BLADDER EMPTYING: ICD-10-CM

## 2021-10-11 DIAGNOSIS — I10 ESSENTIAL HYPERTENSION: ICD-10-CM

## 2021-10-11 RX ORDER — OXYBUTYNIN CHLORIDE 10 MG/1
10 TABLET, EXTENDED RELEASE ORAL DAILY
Qty: 30 TABLET | Refills: 1 | Status: SHIPPED | OUTPATIENT
Start: 2021-10-11 | End: 2021-12-06 | Stop reason: SDUPTHER

## 2021-10-11 RX ORDER — LISINOPRIL 10 MG/1
10 TABLET ORAL DAILY
Qty: 30 TABLET | Refills: 1 | Status: SHIPPED | OUTPATIENT
Start: 2021-10-11 | End: 2021-12-06 | Stop reason: SDUPTHER

## 2021-10-11 NOTE — TELEPHONE ENCOUNTER
Multiple meds pended    Pt has future appt rescheduled       Next Visit Date:  Future Appointments   Date Time Provider Sheldon Jewelli   11/9/2021  1:40 PM Onalee Cranker,  [address] Maintenance   Topic Date Due    Diabetic retinal exam  Never done    Shingles Vaccine (2 of 2) 08/19/2021    Flu vaccine (1) 09/01/2021    Lipid screen  12/02/2021 (Originally 4/27/2021)    Potassium monitoring  12/07/2021    Creatinine monitoring  12/07/2021    Diabetic foot exam  12/14/2021    Diabetic microalbuminuria test  03/15/2022    A1C test (Diabetic or Prediabetic)  09/07/2022    Colon cancer screen fecal DNA test (Cologuard)  01/29/2023    Pneumococcal 0-64 years Vaccine (2 of 2 - PPSV23) 08/30/2023    DTaP/Tdap/Td vaccine (2 - Td or Tdap) 09/13/2026    COVID-19 Vaccine  Completed    Hepatitis C screen  Completed    HIV screen  Completed    Hepatitis A vaccine  Aged Out    Hib vaccine  Aged Out    Meningococcal (ACWY) vaccine  Aged Out       Hemoglobin A1C (%)   Date Value   09/07/2021 5.8   11/17/2020 5.8   10/09/2019 5.9             ( goal A1C is < 7)   Microalb/Crt.  Ratio (mcg/mg creat)   Date Value   03/15/2021 CANNOT BE CALCULATED     LDL Cholesterol (mg/dL)   Date Value   04/27/2020 60   04/09/2019 49       (goal LDL is <100)   AST (U/L)   Date Value   04/09/2019 27     ALT (U/L)   Date Value   04/09/2019 28     BUN (mg/dL)   Date Value   12/07/2020 15     BP Readings from Last 3 Encounters:   09/07/21 130/63   06/28/21 130/80   06/23/21 (!) 153/78          (goal 120/80)    All Future Testing planned in [facility]  Lab Frequency Next Occurrence   PSA, Diagnostic Once 09/18/2021               Patient Active Problem List:     S/P hammer toe correction     Pre-diabetes     Gastroesophageal reflux disease     Essential hypertension     Smoker     A-fib (HCC)     Cataract     Primary osteoarthritis of both knees     Chronic midline low back pain without sciatica     Benign prostatic hyperplasia with incomplete bladder emptying

## 2021-10-18 NOTE — PROGRESS NOTES
Patient instructed to remove shoes and socks and instructed to sit in exam chair. Current PCP is Thierry Lee MD and date of last visit was 6/23/21. Do you have a follow up visit scheduled?   Yes  If yes, the date is 11/9/21

## 2021-10-25 ENCOUNTER — OFFICE VISIT (OUTPATIENT)
Dept: PODIATRY | Age: 61
End: 2021-10-25
Payer: MEDICAID

## 2021-10-25 VITALS
DIASTOLIC BLOOD PRESSURE: 79 MMHG | BODY MASS INDEX: 25.03 KG/M2 | WEIGHT: 195 LBS | HEART RATE: 66 BPM | HEIGHT: 74 IN | SYSTOLIC BLOOD PRESSURE: 141 MMHG

## 2021-10-25 DIAGNOSIS — L85.9 HYPERKERATOSIS: Primary | ICD-10-CM

## 2021-10-25 DIAGNOSIS — Z98.890 S/P HAMMER TOE CORRECTION: ICD-10-CM

## 2021-10-25 DIAGNOSIS — Z87.39 S/P HAMMER TOE CORRECTION: ICD-10-CM

## 2021-10-25 DIAGNOSIS — M21.41 PES PLANUS OF BOTH FEET: ICD-10-CM

## 2021-10-25 DIAGNOSIS — M21.42 PES PLANUS OF BOTH FEET: ICD-10-CM

## 2021-10-25 DIAGNOSIS — L84 CALLUS OF FOOT: ICD-10-CM

## 2021-10-25 DIAGNOSIS — R73.03 PRE-DIABETES: ICD-10-CM

## 2021-10-25 DIAGNOSIS — E11.9 TYPE 2 DIABETES MELLITUS WITHOUT COMPLICATION, WITHOUT LONG-TERM CURRENT USE OF INSULIN (HCC): ICD-10-CM

## 2021-10-25 PROCEDURE — 11056 PARNG/CUTG B9 HYPRKR LES 2-4: CPT | Performed by: STUDENT IN AN ORGANIZED HEALTH CARE EDUCATION/TRAINING PROGRAM

## 2021-10-25 PROCEDURE — 99212 OFFICE O/P EST SF 10 MIN: CPT | Performed by: STUDENT IN AN ORGANIZED HEALTH CARE EDUCATION/TRAINING PROGRAM

## 2021-10-25 RX ORDER — OMEPRAZOLE 20 MG/1
CAPSULE, DELAYED RELEASE ORAL
COMMUNITY
Start: 2021-10-11 | End: 2022-05-23

## 2021-10-25 NOTE — PROGRESS NOTES
[address]  [address] [place] [address], Antonio S Ryan Lucas  Tel: [phone]   Fax: [phone]    Subjective     CC: Painful calluses    HPI:  Brendan Brooks is a 59y.o. year old male without diabetes who presents to the clinic for painful calluses on his both feet. Pt states he is not interested in nail debridement and as he is able to trim them on his own. He does report wearing the shoe inserts daily as instructed, they were obtained from the [place]. Patient denies any history of acute trauma. Patient denies any tingling and numbness in the lower extremities. Patient denies any other pedal complaints. Pt denies any n/v/f/c/sob    Primary care physician is Radha [place] MD.    ROS:    Constitutional: Denies nausea, vomiting, fever, chills. Neurologic: numbness, tingling, and burning in the feet. Vascular: Denies symptoms of lower extremity claudication. Skin: Denies open wounds. Otherwise negative except as noted in the HPI. Objective     Vitals:    10/25/21 1456   BP: (!) 141/79   Pulse: 66       Lab Results   Component Value Date    LABA1C 5.8 09/07/2021       Physical Exam:  General:  Alert and oriented x3. In no acute distress. Lower Extremity Physical Exam:    Vascular: DP and PT pulses are palpable, Bilateral. CFT <3 seconds to all digits, Bilateral.  No edema, Bilateral.  Hair growth is absent to the level of the digits, Bilateral.     Neuro: Saph/sural/SP/DP/plantar sensation intact to light touch. Protective sensation is intact to 10/10 sites as tested with a 5.07g SWMF, Bilateral.     Musculoskeletal: EHL/FHL/GS/TA gross motor intact. TTP to distal digits b/l. Gross deformity is absent, Bilateral.     Dermatologic: Well-healed scars to dorsal feet from previous hammertoe correction. No open wounds, Bilateral.  Interdigital maceration absent, Bilateral. Nails 1-5 bilateral thickened.   Focal hyperkeratotic tissue noted subsecond and third metatarsal heads on bilateral feet. Assessment   Mariana Lim is a 59 y.o. male with     Diagnosis Orders   1. Hyperkeratosis     2. S/P hammer toe correction     3. Pre-diabetes          Plan   Patient examined and evaluated. Diagnosis and treatment options discussed in detail. Focal hyperkeratotic tissue subsecond/third metatarsal head bilateral debrided sharply with a #15 blade down and into the level of the dermis. Patient tolerated without incident. No hemostasis required. Patient was advised to continue daily foot checks, in addition to not ambulating barefoot. Patient to RTC 3 months. Please, call the office with any questions or concerns. Discussed with Dr. Harsh Dewey. No orders of the defined types were placed in this encounter. No orders of the defined types were placed in this encounter.       Conception HARI Bains   Podiatric Medicine & Surgery   10/27/2021 at 3:51 PM

## 2021-12-06 DIAGNOSIS — N40.1 BENIGN PROSTATIC HYPERPLASIA WITH INCOMPLETE BLADDER EMPTYING: ICD-10-CM

## 2021-12-06 DIAGNOSIS — R39.14 BENIGN PROSTATIC HYPERPLASIA WITH INCOMPLETE BLADDER EMPTYING: ICD-10-CM

## 2021-12-06 DIAGNOSIS — I10 ESSENTIAL HYPERTENSION: ICD-10-CM

## 2021-12-06 RX ORDER — OXYBUTYNIN CHLORIDE 10 MG/1
10 TABLET, EXTENDED RELEASE ORAL DAILY
Qty: 30 TABLET | Refills: 1 | Status: SHIPPED | OUTPATIENT
Start: 2021-12-06 | End: 2022-01-18 | Stop reason: SINTOL

## 2021-12-06 RX ORDER — LISINOPRIL 10 MG/1
10 TABLET ORAL DAILY
Qty: 30 TABLET | Refills: 1 | Status: SHIPPED | OUTPATIENT
Start: 2021-12-06 | End: 2022-01-18 | Stop reason: SDUPTHER

## 2021-12-06 NOTE — TELEPHONE ENCOUNTER
Request for lisinopril and oxybutinin. Next Visit Date:  Future Appointments   Date Time Provider Sheldon Jewelli   12/7/2021  2:00 PM Lesley Davis MD [facility] IM [facility]   1/31/2022 12:45 PM Reji Wall DPM [facility] [address] Maintenance   Topic Date Due    Diabetic retinal exam  Never done    Lipid screen  04/27/2021    Flu vaccine (1) 09/01/2021    Potassium monitoring  12/07/2021    Creatinine monitoring  12/07/2021    Diabetic foot exam  12/14/2021    Diabetic microalbuminuria test  03/15/2022    A1C test (Diabetic or Prediabetic)  09/07/2022    Colon cancer screen fecal DNA test (Cologuard)  01/29/2023    Pneumococcal 0-64 years Vaccine (2 of 2 - PPSV23) 08/30/2023    DTaP/Tdap/Td vaccine (2 - Td or Tdap) 09/13/2026    Shingles Vaccine  Completed    COVID-19 Vaccine  Completed    Hepatitis C screen  Completed    HIV screen  Completed    Hepatitis A vaccine  Aged Out    Hib vaccine  Aged Out    Meningococcal (ACWY) vaccine  Aged Out       Hemoglobin A1C (%)   Date Value   09/07/2021 5.8   11/17/2020 5.8   10/09/2019 5.9             ( goal A1C is < 7)   Microalb/Crt.  Ratio (mcg/mg creat)   Date Value   03/15/2021 CANNOT BE CALCULATED     LDL Cholesterol (mg/dL)   Date Value   04/27/2020 60       (goal LDL is <100)   AST (U/L)   Date Value   04/09/2019 27     ALT (U/L)   Date Value   04/09/2019 28     BUN (mg/dL)   Date Value   12/07/2020 15     BP Readings from Last 3 Encounters:   10/25/21 (!) 141/79   09/07/21 130/63   06/28/21 130/80          (goal 120/80)    All Future Testing planned in [facility]  Lab Frequency Next Occurrence   PSA, Diagnostic Once 09/18/2021         Patient Active Problem List:     S/P hammer toe correction     Pre-diabetes     Gastroesophageal reflux disease     Essential hypertension     Smoker     A-fib (HCC)     Cataract     Primary osteoarthritis of both knees     Chronic midline low back pain without sciatica     Benign prostatic hyperplasia with incomplete bladder emptying

## 2022-01-13 DIAGNOSIS — N40.1 BENIGN PROSTATIC HYPERPLASIA WITH INCOMPLETE BLADDER EMPTYING: ICD-10-CM

## 2022-01-13 DIAGNOSIS — R39.14 BENIGN PROSTATIC HYPERPLASIA WITH INCOMPLETE BLADDER EMPTYING: ICD-10-CM

## 2022-01-13 DIAGNOSIS — I48.0 PAROXYSMAL ATRIAL FIBRILLATION (HCC): ICD-10-CM

## 2022-01-13 RX ORDER — TAMSULOSIN HYDROCHLORIDE 0.4 MG/1
0.4 CAPSULE ORAL DAILY
Qty: 30 CAPSULE | Refills: 0 | Status: SHIPPED | OUTPATIENT
Start: 2022-01-13 | End: 2022-01-18 | Stop reason: SINTOL

## 2022-01-13 NOTE — TELEPHONE ENCOUNTER
Request for Flomax. Next Visit Date:  Future Appointments   Date Time Provider Sheldon Cha   1/18/2022  2:00 PM Jason White MD [facility] IM [facility]   1/31/2022 12:45 PM Elen Isabel DPM [facility] Maintenance   Topic Date Due    Diabetic retinal exam  Never done    Lipid screen  04/27/2021    Flu vaccine (1) 09/01/2021    Potassium monitoring  12/07/2021    Creatinine monitoring  12/07/2021    Diabetic foot exam  12/14/2021    Depression Screen  03/09/2022    Diabetic microalbuminuria test  03/15/2022    A1C test (Diabetic or Prediabetic)  09/07/2022    Colon cancer screen fecal DNA test (Cologuard)  01/29/2023    Pneumococcal 0-64 years Vaccine (2 of 2 - PPSV23) 08/30/2023    DTaP/Tdap/Td vaccine (2 - Td or Tdap) 09/13/2026    Shingles Vaccine  Completed    COVID-19 Vaccine  Completed    Hepatitis C screen  Completed    HIV screen  Completed    Hepatitis A vaccine  Aged Out    Hib vaccine  Aged Out    Meningococcal (ACWY) vaccine  Aged Out       Hemoglobin A1C (%)   Date Value   09/07/2021 5.8   11/17/2020 5.8   10/09/2019 5.9             ( goal A1C is < 7)   Microalb/Crt.  Ratio (mcg/mg creat)   Date Value   03/15/2021 CANNOT BE CALCULATED     LDL Cholesterol (mg/dL)   Date Value   04/27/2020 60       (goal LDL is <100)   AST (U/L)   Date Value   04/09/2019 27     ALT (U/L)   Date Value   04/09/2019 28     BUN (mg/dL)   Date Value   12/07/2020 15     BP Readings from Last 3 Encounters:   10/25/21 (!) 141/79   09/07/21 130/63   06/28/21 130/80          (goal 120/80)    All Future Testing planned in [facility]  Lab Frequency Next Occurrence   PSA, Diagnostic Once 09/18/2021         Patient Active Problem List:     S/P hammer toe correction     Pre-diabetes     Gastroesophageal reflux disease     Essential hypertension     Smoker     A-fib (HCC)     Cataract     Primary osteoarthritis of both knees     Chronic midline low back pain without sciatica     Benign prostatic hyperplasia with incomplete bladder emptying

## 2022-01-13 NOTE — TELEPHONE ENCOUNTER
Request for Eliquis. Next Visit Date:  Future Appointments   Date Time Provider Sheldon Jewelli   1/18/2022  2:00 PM Radha Webb MD [facility] IM [facility]   1/31/2022 12:45 PM Bernice Adams DPM [facility] Maintenance   Topic Date Due    Diabetic retinal exam  Never done    Lipid screen  04/27/2021    Flu vaccine (1) 09/01/2021    Potassium monitoring  12/07/2021    Creatinine monitoring  12/07/2021    Diabetic foot exam  12/14/2021    Depression Screen  03/09/2022    Diabetic microalbuminuria test  03/15/2022    A1C test (Diabetic or Prediabetic)  09/07/2022    Colon cancer screen fecal DNA test (Cologuard)  01/29/2023    Pneumococcal 0-64 years Vaccine (2 of 2 - PPSV23) 08/30/2023    DTaP/Tdap/Td vaccine (2 - Td or Tdap) 09/13/2026    Shingles Vaccine  Completed    COVID-19 Vaccine  Completed    Hepatitis C screen  Completed    HIV screen  Completed    Hepatitis A vaccine  Aged Out    Hib vaccine  Aged Out    Meningococcal (ACWY) vaccine  Aged Out       Hemoglobin A1C (%)   Date Value   09/07/2021 5.8   11/17/2020 5.8   10/09/2019 5.9             ( goal A1C is < 7)   Microalb/Crt.  Ratio (mcg/mg creat)   Date Value   03/15/2021 CANNOT BE CALCULATED     LDL Cholesterol (mg/dL)   Date Value   04/27/2020 60       (goal LDL is <100)   AST (U/L)   Date Value   04/09/2019 27     ALT (U/L)   Date Value   04/09/2019 28     BUN (mg/dL)   Date Value   12/07/2020 15     BP Readings from Last 3 Encounters:   10/25/21 (!) 141/79   09/07/21 130/63   06/28/21 130/80          (goal 120/80)    All Future Testing planned in [facility]  Lab Frequency Next Occurrence   PSA, Diagnostic Once 09/18/2021         Patient Active Problem List:     S/P hammer toe correction     Pre-diabetes     Gastroesophageal reflux disease     Essential hypertension     Smoker     A-fib (HCC)     Cataract     Primary osteoarthritis of both knees     Chronic midline low back pain without sciatica     Benign prostatic hyperplasia with incomplete bladder emptying

## 2022-01-18 ENCOUNTER — OFFICE VISIT (OUTPATIENT)
Dept: INTERNAL MEDICINE | Age: 62
End: 2022-01-18
Payer: MEDICAID

## 2022-01-18 VITALS
SYSTOLIC BLOOD PRESSURE: 119 MMHG | HEIGHT: 74 IN | WEIGHT: 182 LBS | HEART RATE: 64 BPM | BODY MASS INDEX: 23.36 KG/M2 | DIASTOLIC BLOOD PRESSURE: 72 MMHG | OXYGEN SATURATION: 96 % | TEMPERATURE: 97.5 F

## 2022-01-18 DIAGNOSIS — I48.0 PAROXYSMAL ATRIAL FIBRILLATION (HCC): ICD-10-CM

## 2022-01-18 DIAGNOSIS — E78.5 HYPERLIPIDEMIA, UNSPECIFIED HYPERLIPIDEMIA TYPE: ICD-10-CM

## 2022-01-18 DIAGNOSIS — N39.42 URINARY INCONTINENCE WITHOUT SENSORY AWARENESS: ICD-10-CM

## 2022-01-18 DIAGNOSIS — I10 ESSENTIAL HYPERTENSION: Primary | ICD-10-CM

## 2022-01-18 DIAGNOSIS — Z23 NEEDS FLU SHOT: ICD-10-CM

## 2022-01-18 PROCEDURE — G8484 FLU IMMUNIZE NO ADMIN: HCPCS | Performed by: STUDENT IN AN ORGANIZED HEALTH CARE EDUCATION/TRAINING PROGRAM

## 2022-01-18 PROCEDURE — 99213 OFFICE O/P EST LOW 20 MIN: CPT | Performed by: STUDENT IN AN ORGANIZED HEALTH CARE EDUCATION/TRAINING PROGRAM

## 2022-01-18 PROCEDURE — 99211 OFF/OP EST MAY X REQ PHY/QHP: CPT | Performed by: INTERNAL MEDICINE

## 2022-01-18 PROCEDURE — G8420 CALC BMI NORM PARAMETERS: HCPCS | Performed by: STUDENT IN AN ORGANIZED HEALTH CARE EDUCATION/TRAINING PROGRAM

## 2022-01-18 PROCEDURE — 4004F PT TOBACCO SCREEN RCVD TLK: CPT | Performed by: STUDENT IN AN ORGANIZED HEALTH CARE EDUCATION/TRAINING PROGRAM

## 2022-01-18 PROCEDURE — G8427 DOCREV CUR MEDS BY ELIG CLIN: HCPCS | Performed by: STUDENT IN AN ORGANIZED HEALTH CARE EDUCATION/TRAINING PROGRAM

## 2022-01-18 PROCEDURE — 3017F COLORECTAL CA SCREEN DOC REV: CPT | Performed by: STUDENT IN AN ORGANIZED HEALTH CARE EDUCATION/TRAINING PROGRAM

## 2022-01-18 RX ORDER — AMLODIPINE BESYLATE 10 MG/1
10 TABLET ORAL DAILY
Qty: 90 TABLET | Refills: 2 | Status: SHIPPED | OUTPATIENT
Start: 2022-01-18 | End: 2022-10-18 | Stop reason: SDUPTHER

## 2022-01-18 RX ORDER — METOPROLOL TARTRATE 50 MG/1
50 TABLET, FILM COATED ORAL 2 TIMES DAILY
Qty: 180 TABLET | Refills: 2 | Status: SHIPPED | OUTPATIENT
Start: 2022-01-18 | End: 2022-10-18 | Stop reason: SDUPTHER

## 2022-01-18 RX ORDER — LISINOPRIL 10 MG/1
10 TABLET ORAL DAILY
Qty: 90 TABLET | Refills: 2 | Status: SHIPPED | OUTPATIENT
Start: 2022-01-18 | End: 2022-02-09

## 2022-01-18 RX ORDER — ATORVASTATIN CALCIUM 40 MG/1
40 TABLET, FILM COATED ORAL NIGHTLY
Qty: 90 TABLET | Refills: 2 | Status: SHIPPED | OUTPATIENT
Start: 2022-01-18 | End: 2022-04-19

## 2022-01-18 ASSESSMENT — PATIENT HEALTH QUESTIONNAIRE - PHQ9
SUM OF ALL RESPONSES TO PHQ QUESTIONS 1-9: 0
2. FEELING DOWN, DEPRESSED OR HOPELESS: 0
1. LITTLE INTEREST OR PLEASURE IN DOING THINGS: 0
SUM OF ALL RESPONSES TO PHQ QUESTIONS 1-9: 0
SUM OF ALL RESPONSES TO PHQ9 QUESTIONS 1 & 2: 0
SUM OF ALL RESPONSES TO PHQ QUESTIONS 1-9: 0
SUM OF ALL RESPONSES TO PHQ QUESTIONS 1-9: 0

## 2022-01-18 NOTE — PATIENT INSTRUCTIONS
Medications e-scribe to pharmacy of pt's choice. Laboratory Instructions: Your doctor has ordered blood or urine testing. You can get this testing done at the Lab located on the first floor of the [facility], or [facility] Please stop at Main Registration, before going to the lab, as you must be registered first.   Please get this lab done before your next visit. You may NOT eat, drink, smoke, or chew anything before this test for 8-12 hours. You may still have water. Labs mailed to patient    Return To Clinic 4/19/2022. Please bring all of your medications to this upcoming appointment. After Visit Summary  mailed to patient. It is very important for your care that you keep your appointment. If for some reason you are unable to keep your appointment it is equally important that you call our office at [phone] to cancel your appointment and reschedule. Failure to do so may result in your termination from our practice.     PAN

## 2022-01-18 NOTE — PROGRESS NOTES
[facility] [address] [address]  Dept: [phone]  Dept Fax: [phone]    Office Progress/Follow Up Note  Date of patient's visit: 1/18/2022  Patient's Name:  Cristhian Sandra YOB: 1960            Patient Care Team:  Alfredito Maradiaga MD as PCP - General (Internal Medicine)  ________________________________________________________________________      Reason for Visit: Routine outpatient follow up  ________________________________________________________________________  Chief Complaint:  Hypertension (4 month f/u) and Health Maintenance (labs pended, pt has foot exam scheduled, pt has eye exam schedule, pt wants flu vaccine)    ________________________________________________________________________  History of Presenting Illness:  History was obtained from: patient, electronic medical record. Cristhian Sandra is a 59 y.o. is here for:     Essential hypertension stable today. Patient is currently on Norvasc, lisinopril, Lopressor and took the medication today.  Urinary incontinence with oxybutynin and tamsulosin. Needs to see urologist.   · Patient has prediabetes with HbA1c of 5.8 in 2021. · Mood is good. · Patient wants flu shot  · Has upcoming eye and podiatry appointment. ·  afib- rate controlled. On eliquis- no dark tarry stools or s/s of GI bleed     Previous visit  · Chronic knee pain because of osteoarthritis of the knees bilaterally  · Chronic back pain  · GERD-on Prilosec  · Alcohol abuse- occasionally  · Smoker- 2-3 cigarrettes  · Cocaine year ago  · Follows up with ophthalmology at [facility], last appointment in February. Coming appointment . · Last echo 2019-EF 62% with normal diastolic function.  Mild MR, RVSP 25  · Last EKG in 2019-was within normal limits  · A. fib diagnosed in May 2019-on Lopressor and Eliquis, denies any dark tarry stools, unsure why he is not on aspirin.  Patient advised to see his cardiologist for follow up  · Needs to see his urologist as part of his yearly follow up  · Seen by podiatrist on June 2021  · Hyperlipidemia with ASCVD risk 28%-Lipitor was started on last visit. · Patient states he is doing well and has no side effects with the medication.     Health Maintenance:  · Has an upcomming colonoscopy. Patient had cologuard done in 2020 and was positive. Had a colonoscopy in the past in 2012, no polyps at that time  · covid vaccine done  · Shingles vaccine  · Patient will need abdominal aortic aneurysm screen at the age of 70. Patient Active Problem List   Diagnosis    S/P hammer toe correction    Pre-diabetes    Gastroesophageal reflux disease    Essential hypertension    Smoker    A-fib (HCC)    Cataract    Primary osteoarthritis of both knees    Chronic midline low back pain without sciatica    Benign prostatic hyperplasia with incomplete bladder emptying       Allergies   Allergen Reactions    Motrin [Ibuprofen Micronized] Anaphylaxis     Swelling to throat         Current Outpatient Medications   Medication Sig Dispense Refill    apixaban (ELIQUIS) 5 MG TABS tablet TAKE 1 TABLET BY MOUTH TWICE A DAY 60 tablet 1    lisinopril (PRINIVIL;ZESTRIL) 10 MG tablet TAKE 1 TABLET BY MOUTH DAILY 30 tablet 1    omeprazole (PRILOSEC) 20 MG delayed release capsule       lidocaine (XYLOCAINE) 5 % ointment Apply topically 2 times daily as needed for Pain Apply topically as needed. 1 each 5    atorvastatin (LIPITOR) 40 MG tablet TAKE 1 TABLET BY MOUTH NIGHTLY 60 tablet 1    amLODIPine (NORVASC) 10 MG tablet Take 1 tablet by mouth daily 90 tablet 3    metoprolol tartrate (LOPRESSOR) 50 MG tablet Take 1 tablet by mouth 2 times daily 180 tablet 3     No current facility-administered medications for this visit.        Social History     Tobacco Use    Smoking status: Current Every Day Smoker     Packs/day: 0.25     Years: 20.00     Pack years: 5.00     Types: Cigarettes     Start date: 1/18/2002    Smokeless tobacco: Never Used    Tobacco comment: 2-3 per day    Substance Use Topics    Alcohol use: Yes     Alcohol/week: 0.0 standard drinks     Comment: drinks every 2 weeks    Drug use: No       Family History   Problem Relation Age of Onset    High Blood Pressure Mother       ________________________________________________________________________  Review of Systems:  CONSTITUTIONAL: Denies: fever, chills  PSYCH: Denies: anxiety, depression  ALLERGIES: Denies: urticaria  EYES: Denies: blurry vision, decreased vision, photophobia  ENT: Denies: sore throat, nasal congestion  CARDIOVASCULAR: Denies: chest pain, dyspnea on exertion  RESPIRATORY: Denies: cough, hemoptysis, shortness of breath  GI: Denies: Denies: abdominal pain, flank pain  : Denies: Denies: dysuria, frequency/urgency  NEURO: Denies: dizzy/vertigo, headache  MUSCULOSKELETAL: Denies: back pain, joint pain  SKIN: Denies: rash, itching  ________________________________________________________________________  Physical Exam:  Vitals:    01/18/22 1400   BP: 119/72   Site: Right Upper Arm   Position: Sitting   Cuff Size: Medium Adult   Pulse: 64   Temp: 97.5 °F (36.4 °C)   TempSrc: Infrared   SpO2: 96%   Weight: 182 lb (82.6 kg)   Height: 6' 2\" (1.88 m)     BP Readings from Last 3 Encounters:   01/18/22 119/72   10/25/21 (!) 141/79   09/07/21 130/63         General Examination    General Resting comfortably    Head Normocephalic, without obvious abnormality   Neck Supple, symmetrical. Good ROM. No midline or paraspinal tenderness. Lungs Respirations unlabored, no wheezing   Chest Wall No deformity   Heart RRR, no murmur   Abdomen Soft. Non-tender, non-distended   Extremities No cyanosis or edema or warmth.    Pulses 2+ and symmetric   Skin: Skin  turgor normal, no rashes or lesions   ________________________________________________________________________  Diagnostic findings:  CBC:  Lab Results   Component Value Date    WBC 4.0 12/07/2020    HGB 13.3 12/07/2020     12/07/2020       BMP:    Lab Results   Component Value Date     12/07/2020    K 4.3 12/07/2020     12/07/2020    CO2 22 12/07/2020    BUN 15 12/07/2020    CREATININE 1.15 12/07/2020    GLUCOSE 96 12/07/2020    GLUCOSE 89 02/02/2012       HEMOGLOBIN A1C:   Lab Results   Component Value Date    LABA1C 5.8 09/07/2021       FASTING LIPID PANEL:  Lab Results   Component Value Date    CHOL 122 04/09/2019    HDL 43 04/27/2020    TRIG 132 04/09/2019     ________________________________________________________________________  Health Maintenance:  Health Maintenance Due   Topic Date Due    Diabetic retinal exam  Never done    Lipid screen  04/27/2021    Flu vaccine (1) 09/01/2021    Potassium monitoring  12/07/2021    Creatinine monitoring  12/07/2021    Diabetic foot exam  12/14/2021     ________________________________________________________________________  Assessment and Plan:    1. Essential hypertension  -Continue Norvasc 10, Lopressor 50, lisinopril 10  - Blood pressure stable today    2. Paroxysmal atrial fibrillation (HCC)  -Stable  - Continue Lopressor and Eliquis    3. Hyperlipidemia, unspecified hyperlipidemia type  -Stable  - Follow-up on lipid panel  - Lipitor 40 daily    4. Needs flu shot  - We will get flu vaccine today    5. Urinary incontinence without sensory awareness  -Secondary to oxybutynin and tamsulosin  - Recommending holding both the meds and following up with urologist    ________________________________________________________________________  Follow up and instructions:  · Follow up in 6 months. · Dennis Clark received counseling on the following healthy behaviors: medication compliance, diet and exercise    · Discussed use, benefit, and side effects of prescribed medications. Barriers to medication compliance addressed. All patient questions answered. Pt voiced understanding.      · Patient given educational materials - see patient instructions    Yue Colbert MD  Internal Medicine Resident  [facility]  1/18/2022 2:31 PM   Attending Physician Statement  I have discussed the care of Ja Cruz, including pertinent history and exam findings,  with the resident. I have reviewed the key elements of all parts of the encounter with the resident. I agree with the assessment, plan and orders as documented by the resident. (GE Modifier)    BP presently stable. Pt says he will make appt with urology. Has upcoming ophth and podiatry appts. This note is created with the assistance of a speech-recognition program. While intending to generate a document that actually reflects the content of the visit, the document can still have some mistakes which may not have been identified and corrected by editing.

## 2022-02-09 DIAGNOSIS — I10 ESSENTIAL HYPERTENSION: ICD-10-CM

## 2022-02-09 RX ORDER — LISINOPRIL 10 MG/1
TABLET ORAL
Qty: 30 TABLET | Refills: 1 | Status: SHIPPED | OUTPATIENT
Start: 2022-02-09 | End: 2022-02-14

## 2022-02-09 NOTE — TELEPHONE ENCOUNTER
Refill request for lisinopril (PRINIVIL;ZESTRIL) 10 MG tablet. If appropriate please send medication(s) to patients pharmacy. Next appt: 4/19/2022      Health Maintenance   Topic Date Due    Diabetic retinal exam  Never done    Lipid screen  04/27/2021    Flu vaccine (1) 09/01/2021    Potassium monitoring  12/07/2021    Creatinine monitoring  12/07/2021    Diabetic foot exam  12/14/2021    Diabetic microalbuminuria test  03/15/2022    A1C test (Diabetic or Prediabetic)  09/07/2022    Depression Screen  01/18/2023    Colon cancer screen fecal DNA test (Cologuard)  01/29/2023    Pneumococcal 0-64 years Vaccine (2 of 2 - PPSV23) 08/30/2023    DTaP/Tdap/Td vaccine (2 - Td or Tdap) 09/13/2026    Shingles Vaccine  Completed    COVID-19 Vaccine  Completed    Hepatitis C screen  Completed    HIV screen  Completed    Hepatitis A vaccine  Aged Out    Hib vaccine  Aged Out    Meningococcal (ACWY) vaccine  Aged Out       Hemoglobin A1C (%)   Date Value   09/07/2021 5.8   11/17/2020 5.8   10/09/2019 5.9             ( goal A1C is < 7)   Microalb/Crt.  Ratio (mcg/mg creat)   Date Value   03/15/2021 CANNOT BE CALCULATED     LDL Cholesterol (mg/dL)   Date Value   04/27/2020 60       (goal LDL is <100)   AST (U/L)   Date Value   04/09/2019 27     ALT (U/L)   Date Value   04/09/2019 28     BUN (mg/dL)   Date Value   12/07/2020 15     BP Readings from Last 3 Encounters:   01/18/22 119/72   10/25/21 (!) 141/79   09/07/21 130/63          (goal 120/80)          Patient Active Problem List:     S/P hammer toe correction     Pre-diabetes     Gastroesophageal reflux disease     Essential hypertension     Smoker     A-fib (HCC)     Cataract     Primary osteoarthritis of both knees     Chronic midline low back pain without sciatica     Benign prostatic hyperplasia with incomplete bladder emptying

## 2022-02-14 DIAGNOSIS — I10 ESSENTIAL HYPERTENSION: ICD-10-CM

## 2022-02-14 RX ORDER — LISINOPRIL 10 MG/1
TABLET ORAL
Qty: 30 TABLET | Refills: 1 | Status: SHIPPED | OUTPATIENT
Start: 2022-02-14 | End: 2022-04-19 | Stop reason: SDUPTHER

## 2022-02-14 NOTE — TELEPHONE ENCOUNTER
Request for Lisinopril. Next Visit Date:  Future Appointments   Date Time Provider Sheldon Eubanks   4/19/2022  1:20 PM Mignon Patel MD [address] [facility] Maintenance   Topic Date Due    Diabetic retinal exam  Never done    Lipid screen  04/27/2021    Flu vaccine (1) 09/01/2021    Potassium monitoring  12/07/2021    Creatinine monitoring  12/07/2021    Diabetic foot exam  12/14/2021    Diabetic microalbuminuria test  03/15/2022    A1C test (Diabetic or Prediabetic)  09/07/2022    Depression Screen  01/18/2023    Colon cancer screen fecal DNA test (Cologuard)  01/29/2023    Pneumococcal 0-64 years Vaccine (2 of 2 - PPSV23) 08/30/2023    DTaP/Tdap/Td vaccine (2 - Td or Tdap) 09/13/2026    Shingles Vaccine  Completed    COVID-19 Vaccine  Completed    Hepatitis C screen  Completed    HIV screen  Completed    Hepatitis A vaccine  Aged Out    Hib vaccine  Aged Out    Meningococcal (ACWY) vaccine  Aged Out       Hemoglobin A1C (%)   Date Value   09/07/2021 5.8   11/17/2020 5.8   10/09/2019 5.9             ( goal A1C is < 7)   Microalb/Crt.  Ratio (mcg/mg creat)   Date Value   03/15/2021 CANNOT BE CALCULATED     LDL Cholesterol (mg/dL)   Date Value   04/27/2020 60       (goal LDL is <100)   AST (U/L)   Date Value   04/09/2019 27     ALT (U/L)   Date Value   04/09/2019 28     BUN (mg/dL)   Date Value   12/07/2020 15     BP Readings from Last 3 Encounters:   01/18/22 119/72   10/25/21 (!) 141/79   09/07/21 130/63          (goal 120/80)    All Future Testing planned in [facility]  Lab Frequency Next Occurrence   PSA, Diagnostic Once 09/18/2021   Lipid, Fasting Once 24/84/0191   Basic Metabolic Panel Once 05/93/4072         Patient Active Problem List:     S/P hammer toe correction     Pre-diabetes     Gastroesophageal reflux disease     Essential hypertension     Smoker     A-fib (HCC)     Cataract     Primary osteoarthritis of both knees     Chronic midline low back pain without sciatica Benign prostatic hyperplasia with incomplete bladder emptying

## 2022-03-21 NOTE — PROGRESS NOTES
Patient instructed to remove shoes and socks and instructed to sit in exam chair. Current PCP is Florencio Bhagat MD and date of last visit was 1/18/22. Do you have a follow up visit scheduled?   Yes  If yes, the date is 4/19/22

## 2022-03-23 ENCOUNTER — APPOINTMENT (OUTPATIENT)
Dept: GENERAL RADIOLOGY | Age: 62
End: 2022-03-23
Payer: MEDICAID

## 2022-03-23 ENCOUNTER — HOSPITAL ENCOUNTER (EMERGENCY)
Age: 62
Discharge: HOME | End: 2022-03-23
Attending: EMERGENCY MEDICINE
Payer: MEDICAID

## 2022-03-23 VITALS
HEART RATE: 60 BPM | DIASTOLIC BLOOD PRESSURE: 82 MMHG | SYSTOLIC BLOOD PRESSURE: 114 MMHG | HEIGHT: 74 IN | BODY MASS INDEX: 25.03 KG/M2 | OXYGEN SATURATION: 99 % | TEMPERATURE: 97.2 F | WEIGHT: 195 LBS | RESPIRATION RATE: 18 BRPM

## 2022-03-23 DIAGNOSIS — S60.111A SUBUNGUAL HEMATOMA OF RIGHT THUMB, INITIAL ENCOUNTER: Primary | ICD-10-CM

## 2022-03-23 PROCEDURE — 73130 X-RAY EXAM OF HAND: CPT

## 2022-03-23 PROCEDURE — 2500000003 HC RX 250 WO HCPCS: Performed by: EMERGENCY MEDICINE

## 2022-03-23 PROCEDURE — 99283 EMERGENCY DEPT VISIT LOW MDM: CPT

## 2022-03-23 PROCEDURE — 10140 I&D HMTMA SEROMA/FLUID COLLJ: CPT

## 2022-03-23 PROCEDURE — 6370000000 HC RX 637 (ALT 250 FOR IP): Performed by: EMERGENCY MEDICINE

## 2022-03-23 RX ORDER — LIDOCAINE HYDROCHLORIDE 10 MG/ML
20 INJECTION, SOLUTION INFILTRATION; PERINEURAL ONCE
Status: COMPLETED | OUTPATIENT
Start: 2022-03-23 | End: 2022-03-23

## 2022-03-23 RX ORDER — ACETAMINOPHEN 500 MG
1000 TABLET ORAL ONCE
Status: COMPLETED | OUTPATIENT
Start: 2022-03-23 | End: 2022-03-23

## 2022-03-23 RX ADMIN — LIDOCAINE HYDROCHLORIDE 20 ML: 10 INJECTION, SOLUTION INFILTRATION; PERINEURAL at 12:30

## 2022-03-23 RX ADMIN — ACETAMINOPHEN 1000 MG: 500 TABLET ORAL at 11:42

## 2022-03-23 ASSESSMENT — PAIN SCALES - GENERAL
PAINLEVEL_OUTOF10: 2
PAINLEVEL_OUTOF10: 8

## 2022-03-23 ASSESSMENT — ENCOUNTER SYMPTOMS: COLOR CHANGE: 1

## 2022-03-23 ASSESSMENT — PAIN - FUNCTIONAL ASSESSMENT: PAIN_FUNCTIONAL_ASSESSMENT: 0-10

## 2022-03-23 NOTE — ED PROVIDER NOTES
mouth daily  Qty: 90 tablet, Refills: 2    Associated Diagnoses: Essential hypertension      metoprolol tartrate (LOPRESSOR) 50 MG tablet Take 1 tablet by mouth 2 times daily  Qty: 180 tablet, Refills: 2    Associated Diagnoses: Paroxysmal atrial fibrillation (HCC)      apixaban (ELIQUIS) 5 MG TABS tablet TAKE 1 TABLET BY MOUTH TWICE A DAY  Qty: 90 tablet, Refills: 2    Associated Diagnoses: Paroxysmal atrial fibrillation (HCC)      atorvastatin (LIPITOR) 40 MG tablet Take 1 tablet by mouth nightly  Qty: 90 tablet, Refills: 2    Associated Diagnoses: Essential hypertension; Hyperlipidemia, unspecified hyperlipidemia type      omeprazole (PRILOSEC) 20 MG delayed release capsule              ALLERGIES       is allergic to motrin [ibuprofen micronized]. FAMILY HISTORY       He indicated that the status of his mother is unknown.     family history includes High Blood Pressure in his mother. SOCIAL HISTORY        reports that he has been smoking cigarettes. He started smoking about 20 years ago. He has a 5.00 pack-year smoking history. He has never used smokeless tobacco. He reports current alcohol use. He reports that he does not use drugs. PHYSICAL EXAM       INITIAL VITALS:  height is 6' 2\" (1.88 m) and weight is 195 lb (88.5 kg). His oral temperature is 97.2 °F (36.2 °C). His blood pressure is 114/82 and his pulse is 60. His respiration is 18 and oxygen saturation is 99%. Physical Exam  Vitals and nursing note reviewed. Constitutional:       General: He is not in acute distress. Appearance: Normal appearance. Cardiovascular:      Rate and Rhythm: Normal rate and regular rhythm. Heart sounds: Normal heart sounds. No murmur heard. Pulmonary:      Effort: Pulmonary effort is normal.      Breath sounds: Normal breath sounds. Musculoskeletal:      Comments: On examination of the right thumb patient has discomfort only in the area of the distal phalanx.   Patient has a large, dark subungual hematoma measuring approximately 50 to 60% of the proximal nail bed. Patient also has some swelling over the eponychial fold with overlying pallor concerning for paronychia. There is a pinpoint open wound with some drainage already present. Intact peripheral perfusion and sensation. Normal range of motion of the interphalangeal joint   Skin:     General: Skin is warm and dry. Capillary Refill: Capillary refill takes less than 2 seconds. Neurological:      General: No focal deficit present. Mental Status: He is alert. Psychiatric:         Mood and Affect: Mood normal.         Behavior: Behavior normal.       DIAGNOSTIC RESULTS     RADIOLOGY:   I directly visualized the following images and reviewed the radiologist interpretations:    XR HAND RIGHT (MIN 3 VIEWS)   Final Result   No acute findings.   Degenerative changes as described above             EMERGENCY DEPARTMENT COURSE:     Vitals:    Vitals:    03/23/22 1043   BP: 114/82   Pulse: 60   Resp: 18   Temp: 97.2 °F (36.2 °C)   TempSrc: Oral   SpO2: 99%   Weight: 195 lb (88.5 kg)   Height: 6' 2\" (1.88 m)     -------------------------  BP: 114/82, Temp: 97.2 °F (36.2 °C), Pulse: 60, Resp: 18      Medication allergies reviewed  Acetaminophen ordered awaiting x-ray      PROCEDURES:  A digital block was performed to the right thumb  Betadine was used to prep the base of the thumb  Approximately 7 mL of 2% lidocaine without epinephrine was used with good anesthesia distally  2 holes were placed at the proximal nail bed for trephination using an electrocautery pen  The skin over the edema at the eponychial fold was cleaned with Betadine  An 11 blade was used to make 2 small incisions and the area was drained  Returned dark blood only  No purulence  Patient tolerated well  Area cleaned after the procedure and dressed      MEDICAL DECISION MAKING:     The initial exam had concern for possible paronychia  After the area of the eponychial fold was drained there was no purulence expressed and as such I believe this was all hematoma related  No fracture  Patient tolerated very well  Antibiotics not indicated  Tetanus is up-to-date  Stable for discharge      FINAL IMPRESSION      1. Subungual hematoma of right thumb, initial encounter          DISPOSITION/PLAN     Discharge      PATIENT REFERRED TO:  Leni Unger MD  [address] [facility] [address] [address] [phone]    Call   As needed      (Please note that portions of this note were completed with a voice recognition program.  Efforts were made to edit the dictations but occasionally words are mis-transcribed.)    Lilly Jean.  DO Allegra  Attending Emergency Physician        Madison Preston DO  03/23/22 0298

## 2022-03-28 ENCOUNTER — OFFICE VISIT (OUTPATIENT)
Dept: PODIATRY | Age: 62
End: 2022-03-28
Payer: MEDICAID

## 2022-03-28 VITALS
HEIGHT: 74 IN | DIASTOLIC BLOOD PRESSURE: 74 MMHG | HEART RATE: 56 BPM | WEIGHT: 195 LBS | SYSTOLIC BLOOD PRESSURE: 126 MMHG | BODY MASS INDEX: 25.03 KG/M2

## 2022-03-28 DIAGNOSIS — L85.9 HYPERKERATOSIS: Primary | ICD-10-CM

## 2022-03-28 DIAGNOSIS — M20.42 HAMMER TOES OF BOTH FEET: ICD-10-CM

## 2022-03-28 DIAGNOSIS — M20.41 HAMMER TOES OF BOTH FEET: ICD-10-CM

## 2022-03-28 PROCEDURE — G8427 DOCREV CUR MEDS BY ELIG CLIN: HCPCS

## 2022-03-28 PROCEDURE — 99213 OFFICE O/P EST LOW 20 MIN: CPT

## 2022-03-28 PROCEDURE — G8417 CALC BMI ABV UP PARAM F/U: HCPCS

## 2022-03-28 PROCEDURE — 4004F PT TOBACCO SCREEN RCVD TLK: CPT

## 2022-03-28 PROCEDURE — G8484 FLU IMMUNIZE NO ADMIN: HCPCS

## 2022-03-28 PROCEDURE — 3017F COLORECTAL CA SCREEN DOC REV: CPT

## 2022-03-28 NOTE — PROGRESS NOTES
[facility] [address], [place]  Tel: [phone]   Fax: [phone]    Subjective     CC: Painful calluses    HPI:  Watson Houston is a 59y.o. year old male who presents to the clinic for painful calluses on both feet. Patient states he has been having these calluses for many years, and is considering surgical treatment of digital deformity in the summer months. He does report wearing shoe inserts daily as instructed, they were obtained from the [place]. Patient denies any history of acute trauma. Patient denies any tingling and numbness in the lower extremities. Patient denies any other pedal complaints. Pt denies any n/v/f/c/sob    Primary care physician is Nicole Villa MD.    ROS:    Constitutional: Denies nausea, vomiting, fever, chills. Neurologic: numbness, tingling, and burning in the feet. Vascular: Denies symptoms of lower extremity claudication. Skin: Denies open wounds. Otherwise negative except as noted in the HPI. Objective     Vitals:    03/28/22 1236   BP: 126/74   Pulse: 56       Lab Results   Component Value Date    LABA1C 5.8 09/07/2021       Physical Exam:  General:  Alert and oriented x3. In no acute distress. Lower Extremity Physical Exam:    Vascular: DP and PT pulses are palpable, Bilateral. CFT <3 seconds to all digits, Bilateral.  No edema, Bilateral.  Hair growth is absent to the level of the digits, Bilateral.     Neuro: Saph/sural/SP/DP/plantar sensation intact to light touch. Protective sensation is intact to 10/10 sites as tested with a 5.07g SWMF, Bilateral.     Musculoskeletal: EHL/FHL/GS/TA gross motor intact. TTP to distal digits b/l. Gross deformity is absent, Bilateral.     Dermatologic: Well-healed scars to dorsal feet from previous hammertoe correction. No open wounds, Bilateral.  Interdigital maceration absent, Bilateral. Nails 1-5 bilateral thickened.   Focal hyperkeratotic tissue noted subsecond and third metatarsal heads on bilateral feet. Assessment   Esperanza Carpenter is a 59 y.o. male with     Diagnosis Orders   1. Hyperkeratosis     2. Hammer toes of both feet          Plan   Patient examined and evaluated. Diagnosis and treatment options discussed in detail. Hyperkeratotic lesions to bilateral feet debrided sharply with a #15 blade down and into the level of the dermis. Patient tolerated without incident. No hemostasis required. Patient was advised to continue daily foot checks, in addition to not ambulating barefoot. Patient to RTC 3 months. Please, call the office with any questions or concerns. Discussed with Dr. Cynthia Mendoza. No orders of the defined types were placed in this encounter. No orders of the defined types were placed in this encounter.       Ayesha Silva DPM   Podiatric Medicine & Surgery   3/29/2022 at 10:31 AM

## 2022-04-05 DIAGNOSIS — R39.14 BENIGN PROSTATIC HYPERPLASIA WITH INCOMPLETE BLADDER EMPTYING: ICD-10-CM

## 2022-04-05 DIAGNOSIS — R73.03 PRE-DIABETES: ICD-10-CM

## 2022-04-05 DIAGNOSIS — N40.1 BENIGN PROSTATIC HYPERPLASIA WITH INCOMPLETE BLADDER EMPTYING: ICD-10-CM

## 2022-04-06 RX ORDER — TAMSULOSIN HYDROCHLORIDE 0.4 MG/1
0.4 CAPSULE ORAL DAILY
Qty: 30 CAPSULE | Refills: 0 | OUTPATIENT
Start: 2022-04-06 | End: 2022-06-05

## 2022-04-06 NOTE — TELEPHONE ENCOUNTER
correction     Pre-diabetes     Gastroesophageal reflux disease     Essential hypertension     Smoker     A-fib (HCC)     Cataract     Primary osteoarthritis of both knees     Chronic midline low back pain without sciatica     Benign prostatic hyperplasia with incomplete bladder emptying

## 2022-04-06 NOTE — TELEPHONE ENCOUNTER
Request for Flomax. The original prescription was discontinued on 1/18/2022 by Kelsie Medina MD for the following reason: Side effects. Renewing this prescription may not be appropriate. Next Visit Date:  Future Appointments   Date Time Provider Sheldon Eubanks   4/19/2022  1:20 PM Kelsie Medina MD [facility]   7/18/2022 12:45 PM Annita Washington DPM [facility] Maintenance   Topic Date Due    Diabetic retinal exam  Never done    Lipid screen  04/27/2021    Potassium monitoring  12/07/2021    Creatinine monitoring  12/07/2021    Diabetic foot exam  12/14/2021    Diabetic microalbuminuria test  03/15/2022    Flu vaccine (Season Ended) 09/01/2022    A1C test (Diabetic or Prediabetic)  09/07/2022    Depression Screen  01/18/2023    Colorectal Cancer Screen  01/29/2023    Pneumococcal 0-64 years Vaccine (2 of 2 - PPSV23) 08/30/2023    DTaP/Tdap/Td vaccine (2 - Td or Tdap) 09/13/2026    Shingles Vaccine  Completed    COVID-19 Vaccine  Completed    Hepatitis C screen  Completed    HIV screen  Completed    Hepatitis A vaccine  Aged Out    Hib vaccine  Aged Out    Meningococcal (ACWY) vaccine  Aged Out       Hemoglobin A1C (%)   Date Value   09/07/2021 5.8   11/17/2020 5.8   10/09/2019 5.9             ( goal A1C is < 7)   Microalb/Crt.  Ratio (mcg/mg creat)   Date Value   03/15/2021 CANNOT BE CALCULATED     LDL Cholesterol (mg/dL)   Date Value   04/27/2020 60       (goal LDL is <100)   AST (U/L)   Date Value   04/09/2019 27     ALT (U/L)   Date Value   04/09/2019 28     BUN (mg/dL)   Date Value   12/07/2020 15     BP Readings from Last 3 Encounters:   03/28/22 126/74   03/23/22 114/82   01/18/22 119/72          (goal 120/80)    All Future Testing planned in [facility]  Lab Frequency Next Occurrence   PSA, Diagnostic Once 09/18/2021   Lipid, Fasting Once 25/42/7404   Basic Metabolic Panel Once 34/63/7468         Patient Active Problem List:     S/P hammer toe correction Pre-diabetes     Gastroesophageal reflux disease     Essential hypertension     Smoker     A-fib (HCC)     Cataract     Primary osteoarthritis of both knees     Chronic midline low back pain without sciatica     Benign prostatic hyperplasia with incomplete bladder emptying

## 2022-04-15 NOTE — PROGRESS NOTES
[facility]  [facility] [address] [address]  Dept: [phone]  Dept Fax: [phone]    Office Progress/Follow Up Note  Date of patient's visit: 4/19/2022  Patient's Name:  Sherrie Ruelas YOB: 1960            Patient Care Team:  Nichole Walker MD as PCP - General (Internal Medicine)  ________________________________________________________________________      Reason for Visit: Routine outpatient follow up  ________________________________________________________________________  Chief Complaint:  Hypertension (pt took medication today), Health Maintenance (pt states he goingto schedule a appt so for diabetic eye exam, pt stated he had a eye exam on 3/28/22 pt podiatry appt at [facility] dr dial), Hand Injury (right thumb injury), and Cholesterol Problem (discuss atorvastatin)    ________________________________________________________________________  History of Presenting Illness:  History was obtained from: patient, electronic medical record. Sherrie Ruelas is a 59 y.o. is here for:     Hypertension-  133/72- lisinopril, norvasc and metoprolol. · A. fib- rate controlled. On eliquis- no dark tarry stools or s/s of GI bleed. · Has BPH and follows up with urologist and takes oxybutynin and flomax from them. Will need updated med list from the pharmacy. · Prediabetes with HbA1c of 5.8 in 2021. · Has upcoming eye appointment on 23 may at [facility]  · Has recently saw foot doctor. · Needs Lipitor refill.       Previous visit  · Chronic knee pain because of osteoarthritis of the knees bilaterally  · Chronic back pain  · GERD-on Prilosec  · Alcohol abuse- occasionally  · Smoker- 2-3 cigarrettes  · Cocaine year ago  · Last echo 2019-EF 62% with normal diastolic function.  Mild MR, RVSP 25  · Last EKG in 2019-was within normal limits  · A. fib diagnosed in May 2019-on Lopressor and Eliquis, denies any dark tarry stools, unsure why he is not on drinks     Comment: drinks every 2 weeks    Drug use: No       Family History   Problem Relation Age of Onset    High Blood Pressure Mother       ________________________________________________________________________  Review of Systems:  CONSTITUTIONAL: Denies: fever, chills  PSYCH: Denies: anxiety, depression  ALLERGIES: Denies: urticaria  EYES: Denies: blurry vision, decreased vision, photophobia  ENT: Denies: sore throat, nasal congestion  CARDIOVASCULAR: Denies: chest pain, dyspnea on exertion  RESPIRATORY: Denies: cough, hemoptysis, shortness of breath  GI: Denies: Denies: abdominal pain, flank pain  : Denies: Denies: dysuria, frequency/urgency  NEURO: Denies: dizzy/vertigo, headache  MUSCULOSKELETAL: Denies: back pain, joint pain  SKIN: Denies: rash, itching  ________________________________________________________________________  Physical Exam:  Vitals:    04/19/22 1336   BP: 133/72   Site: Right Upper Arm   Position: Sitting   Cuff Size: Medium Adult   Pulse: 58   Weight: 196 lb 12.8 oz (89.3 kg)   Height: 6' 2\" (1.88 m)     BP Readings from Last 3 Encounters:   04/19/22 133/72   03/28/22 126/74   03/23/22 114/82         General Examination    General Resting comfortably    Head Normocephalic, without obvious abnormality   Neck Supple, symmetrical. Good ROM. No midline or paraspinal tenderness. Lungs Respirations unlabored, no wheezing       Heart Irregular rhythm, no murmur   Abdomen Soft. Non-tender, non-distended   Extremities No cyanosis or edema or warmth.  Right thumb injury healing well   Pulses 2+ and symmetric       ________________________________________________________________________  Diagnostic findings:  CBC:  Lab Results   Component Value Date    WBC 4.0 12/07/2020    HGB 13.3 12/07/2020     12/07/2020       BMP:    Lab Results   Component Value Date     12/07/2020    K 4.3 12/07/2020     12/07/2020    CO2 22 12/07/2020    BUN 15 12/07/2020    CREATININE 1.15 12/07/2020    GLUCOSE 96 12/07/2020    GLUCOSE 89 02/02/2012       HEMOGLOBIN A1C:   Lab Results   Component Value Date    LABA1C 5.8 09/07/2021       FASTING LIPID PANEL:  Lab Results   Component Value Date    CHOL 122 04/09/2019    HDL 43 04/27/2020    TRIG 132 04/09/2019     ________________________________________________________________________  Health Maintenance:  Health Maintenance Due   Topic Date Due    Diabetic retinal exam  Never done    Pneumococcal 0-64 years Vaccine (2 - PCV) 12/08/2016    Lipid screen  04/27/2021    Potassium monitoring  12/07/2021    Creatinine monitoring  12/07/2021    Diabetic foot exam  12/14/2021    Diabetic microalbuminuria test  03/15/2022     ________________________________________________________________________  Assessment and Plan:    1. Essential hypertension  -Stable  - Continue lisinopril, Norvasc and metoprolol  - Needs CMP    2. Hyperlipidemia, unspecified hyperlipidemia type  -Stable  - Continue Lipitor  - Follow-up on LFTs and lipid panel    3. Prediabetes  -Diet controlled  -Follow-up on HbA1c    4. Screening for colon cancer  - Had abnormal Cologuard in 2020-needs colonoscopy  - Referral given      ________________________________________________________________________  Follow up and instructions:  · Follow up in 3 months. · Jennifer Hamm received counseling on the following healthy behaviors: medication compliance, diet and exercise    · Discussed use, benefit, and side effects of prescribed medications. Barriers to medication compliance addressed. All patient questions answered. Pt voiced understanding.      · Patient given educational materials - see patient instructions    Nichole Walker MD  Internal Medicine Resident  [address] [facility]  4/19/2022 2:15 PM        This note is created with the assistance of a speech-recognition program. While intending to generate a document that actually reflects the content of the visit, the document can still have some mistakes which may not have been identified and corrected by editing.

## 2022-04-19 ENCOUNTER — OFFICE VISIT (OUTPATIENT)
Dept: INTERNAL MEDICINE | Age: 62
End: 2022-04-19
Payer: MEDICAID

## 2022-04-19 VITALS
WEIGHT: 196.8 LBS | BODY MASS INDEX: 25.26 KG/M2 | HEIGHT: 74 IN | SYSTOLIC BLOOD PRESSURE: 133 MMHG | DIASTOLIC BLOOD PRESSURE: 72 MMHG | HEART RATE: 58 BPM

## 2022-04-19 DIAGNOSIS — Z12.11 SCREENING FOR COLON CANCER: ICD-10-CM

## 2022-04-19 DIAGNOSIS — E78.5 HYPERLIPIDEMIA, UNSPECIFIED HYPERLIPIDEMIA TYPE: ICD-10-CM

## 2022-04-19 DIAGNOSIS — R73.03 PREDIABETES: ICD-10-CM

## 2022-04-19 DIAGNOSIS — I10 ESSENTIAL HYPERTENSION: Primary | ICD-10-CM

## 2022-04-19 PROCEDURE — 99213 OFFICE O/P EST LOW 20 MIN: CPT | Performed by: STUDENT IN AN ORGANIZED HEALTH CARE EDUCATION/TRAINING PROGRAM

## 2022-04-19 PROCEDURE — 4004F PT TOBACCO SCREEN RCVD TLK: CPT | Performed by: STUDENT IN AN ORGANIZED HEALTH CARE EDUCATION/TRAINING PROGRAM

## 2022-04-19 PROCEDURE — 3017F COLORECTAL CA SCREEN DOC REV: CPT | Performed by: STUDENT IN AN ORGANIZED HEALTH CARE EDUCATION/TRAINING PROGRAM

## 2022-04-19 PROCEDURE — 99211 OFF/OP EST MAY X REQ PHY/QHP: CPT | Performed by: INTERNAL MEDICINE

## 2022-04-19 PROCEDURE — G8427 DOCREV CUR MEDS BY ELIG CLIN: HCPCS | Performed by: STUDENT IN AN ORGANIZED HEALTH CARE EDUCATION/TRAINING PROGRAM

## 2022-04-19 PROCEDURE — G8417 CALC BMI ABV UP PARAM F/U: HCPCS | Performed by: STUDENT IN AN ORGANIZED HEALTH CARE EDUCATION/TRAINING PROGRAM

## 2022-04-19 RX ORDER — ATORVASTATIN CALCIUM 40 MG/1
40 TABLET, FILM COATED ORAL NIGHTLY
Qty: 90 TABLET | Refills: 2 | Status: SHIPPED | OUTPATIENT
Start: 2022-04-19 | End: 2022-10-18 | Stop reason: SDUPTHER

## 2022-04-19 RX ORDER — LISINOPRIL 10 MG/1
TABLET ORAL
Qty: 90 TABLET | Refills: 3 | Status: SHIPPED | OUTPATIENT
Start: 2022-04-19 | End: 2022-10-18 | Stop reason: SDUPTHER

## 2022-04-19 SDOH — ECONOMIC STABILITY: FOOD INSECURITY: WITHIN THE PAST 12 MONTHS, THE FOOD YOU BOUGHT JUST DIDN'T LAST AND YOU DIDN'T HAVE MONEY TO GET MORE.: NEVER TRUE

## 2022-04-19 SDOH — ECONOMIC STABILITY: FOOD INSECURITY: WITHIN THE PAST 12 MONTHS, YOU WORRIED THAT YOUR FOOD WOULD RUN OUT BEFORE YOU GOT MONEY TO BUY MORE.: NEVER TRUE

## 2022-04-19 ASSESSMENT — SOCIAL DETERMINANTS OF HEALTH (SDOH): HOW HARD IS IT FOR YOU TO PAY FOR THE VERY BASICS LIKE FOOD, HOUSING, MEDICAL CARE, AND HEATING?: NOT HARD AT ALL

## 2022-04-19 NOTE — PATIENT INSTRUCTIONS
Follow-up appointment scheduled for pt on wait list call office in September for October appointment, AVS given to patient. Medications e-scribe to pharmacy of pt's choice. Labs given to patient, they will have them done before their next visit.      jw

## 2022-04-19 NOTE — PROGRESS NOTES
Attending Physician Statement  I have discussed the care of Elizabeth Oliver including pertinent history and exam findings,  with the resident. I have reviewed the key elements of all parts of the encounter with the resident. I agree with the assessment, plan and orders as documented by the resident.   (GE Modifier)    MD INGRIS Burrows  Attending Physician, [address] [place] [address], Internal Medicine Residency Program  [address] [place] [address]  4/19/2022, 2:21 PM

## 2022-05-16 ENCOUNTER — HOSPITAL ENCOUNTER (OUTPATIENT)
Age: 62
Setting detail: SPECIMEN
Discharge: HOME | End: 2022-05-16

## 2022-05-16 DIAGNOSIS — I10 ESSENTIAL HYPERTENSION: ICD-10-CM

## 2022-05-16 DIAGNOSIS — E78.5 HYPERLIPIDEMIA, UNSPECIFIED HYPERLIPIDEMIA TYPE: ICD-10-CM

## 2022-05-16 DIAGNOSIS — R73.03 PREDIABETES: ICD-10-CM

## 2022-05-16 LAB
ALBUMIN SERPL-MCNC: 4.6 G/DL (ref 3.5–5.2)
ALBUMIN/GLOBULIN RATIO: 1.4 (ref 1–2.5)
ALP BLD-CCNC: 107 U/L (ref 40–129)
ALT SERPL-CCNC: 21 U/L (ref 5–41)
ANION GAP SERPL CALCULATED.3IONS-SCNC: 13 MMOL/L (ref 9–17)
AST SERPL-CCNC: 24 U/L
BILIRUB SERPL-MCNC: 0.46 MG/DL (ref 0.3–1.2)
BUN BLDV-MCNC: 12 MG/DL (ref 8–23)
CALCIUM SERPL-MCNC: 9.7 MG/DL (ref 8.6–10.4)
CHLORIDE BLD-SCNC: 105 MMOL/L (ref 98–107)
CHOLESTEROL/HDL RATIO: 2.1
CHOLESTEROL: 111 MG/DL
CO2: 21 MMOL/L (ref 20–31)
CREAT SERPL-MCNC: 1.03 MG/DL (ref 0.7–1.2)
ESTIMATED AVERAGE GLUCOSE: 117 MG/DL
GFR AFRICAN AMERICAN: >60 ML/MIN
GFR NON-AFRICAN AMERICAN: >60 ML/MIN
GFR SERPL CREATININE-BSD FRML MDRD: NORMAL ML/MIN/{1.73_M2}
GLUCOSE BLD-MCNC: 92 MG/DL (ref 70–99)
HBA1C MFR BLD: 5.7 % (ref 4–6)
HDLC SERPL-MCNC: 54 MG/DL
LDL CHOLESTEROL: 45 MG/DL (ref 0–130)
POTASSIUM SERPL-SCNC: 3.9 MMOL/L (ref 3.7–5.3)
SODIUM BLD-SCNC: 139 MMOL/L (ref 135–144)
TOTAL PROTEIN: 7.9 G/DL (ref 6.4–8.3)
TRIGL SERPL-MCNC: 59 MG/DL

## 2022-05-23 RX ORDER — OMEPRAZOLE 20 MG/1
CAPSULE, DELAYED RELEASE ORAL
Qty: 30 CAPSULE | Refills: 3 | Status: SHIPPED | OUTPATIENT
Start: 2022-05-23 | End: 2022-09-14

## 2022-05-23 NOTE — TELEPHONE ENCOUNTER
Request for omeprazole. Next Visit Date:  Future Appointments   Date Time Provider Sheldon Eubanks   7/18/2022 12:45 PM Jeff Mota DPM [facility] Maintenance   Topic Date Due    Diabetic retinal exam  Never done    Pneumococcal 0-64 years Vaccine (2 - PCV) 12/08/2016    Prostate Specific Antigen (PSA) Screening or Monitoring  02/11/2021    Diabetic foot exam  12/14/2021    Diabetic microalbuminuria test  03/15/2022    Flu vaccine (Season Ended) 09/01/2022    Depression Screen  01/18/2023    Colorectal Cancer Screen  01/29/2023    A1C test (Diabetic or Prediabetic)  05/16/2023    Lipids  05/16/2023    DTaP/Tdap/Td vaccine (2 - Td or Tdap) 09/13/2026    Shingles vaccine  Completed    COVID-19 Vaccine  Completed    Hepatitis C screen  Completed    HIV screen  Completed    Hepatitis A vaccine  Aged Out    Hib vaccine  Aged Out    Meningococcal (ACWY) vaccine  Aged Out       Hemoglobin A1C (%)   Date Value   05/16/2022 5.7   09/07/2021 5.8   11/17/2020 5.8             ( goal A1C is < 7)   Microalb/Crt.  Ratio (mcg/mg creat)   Date Value   03/15/2021 CANNOT BE CALCULATED     LDL Cholesterol (mg/dL)   Date Value   05/16/2022 45       (goal LDL is <100)   AST (U/L)   Date Value   05/16/2022 24     ALT (U/L)   Date Value   05/16/2022 21     BUN (mg/dL)   Date Value   05/16/2022 12     BP Readings from Last 3 Encounters:   04/19/22 133/72   03/28/22 126/74   03/23/22 114/82          (goal 120/80)    All Future Testing planned in [facility]  Lab Frequency Next Occurrence   PSA, Diagnostic Once 09/18/2021   Lipid, Fasting Once 43/95/2125   Basic Metabolic Panel Once 63/53/0015         Patient Active Problem List:     S/P hammer toe correction     Prediabetes     Gastroesophageal reflux disease     Essential hypertension     Smoker     A-fib (HCC)     Cataract     Primary osteoarthritis of both knees     Chronic midline low back pain without sciatica     Benign prostatic

## 2022-05-31 ENCOUNTER — OFFICE VISIT (OUTPATIENT)
Dept: INTERNAL MEDICINE | Age: 62
End: 2022-05-31
Payer: MEDICAID

## 2022-05-31 VITALS
TEMPERATURE: 96.4 F | OXYGEN SATURATION: 99 % | BODY MASS INDEX: 24.26 KG/M2 | SYSTOLIC BLOOD PRESSURE: 129 MMHG | HEIGHT: 74 IN | HEART RATE: 51 BPM | WEIGHT: 189 LBS | DIASTOLIC BLOOD PRESSURE: 68 MMHG

## 2022-05-31 DIAGNOSIS — Z12.11 SCREENING FOR COLON CANCER: ICD-10-CM

## 2022-05-31 DIAGNOSIS — M79.674 PAIN OF TOE OF RIGHT FOOT: Primary | ICD-10-CM

## 2022-05-31 DIAGNOSIS — R73.03 PREDIABETES: ICD-10-CM

## 2022-05-31 DIAGNOSIS — E11.9 ENCOUNTER FOR DIABETIC FOOT EXAM (HCC): ICD-10-CM

## 2022-05-31 PROCEDURE — G8427 DOCREV CUR MEDS BY ELIG CLIN: HCPCS | Performed by: STUDENT IN AN ORGANIZED HEALTH CARE EDUCATION/TRAINING PROGRAM

## 2022-05-31 PROCEDURE — 99213 OFFICE O/P EST LOW 20 MIN: CPT | Performed by: STUDENT IN AN ORGANIZED HEALTH CARE EDUCATION/TRAINING PROGRAM

## 2022-05-31 PROCEDURE — G8420 CALC BMI NORM PARAMETERS: HCPCS | Performed by: STUDENT IN AN ORGANIZED HEALTH CARE EDUCATION/TRAINING PROGRAM

## 2022-05-31 PROCEDURE — 2022F DILAT RTA XM EVC RTNOPTHY: CPT | Performed by: STUDENT IN AN ORGANIZED HEALTH CARE EDUCATION/TRAINING PROGRAM

## 2022-05-31 PROCEDURE — 3017F COLORECTAL CA SCREEN DOC REV: CPT | Performed by: STUDENT IN AN ORGANIZED HEALTH CARE EDUCATION/TRAINING PROGRAM

## 2022-05-31 PROCEDURE — 3044F HG A1C LEVEL LT 7.0%: CPT | Performed by: STUDENT IN AN ORGANIZED HEALTH CARE EDUCATION/TRAINING PROGRAM

## 2022-05-31 PROCEDURE — 4004F PT TOBACCO SCREEN RCVD TLK: CPT | Performed by: STUDENT IN AN ORGANIZED HEALTH CARE EDUCATION/TRAINING PROGRAM

## 2022-05-31 NOTE — PROGRESS NOTES
[facility]  [facility] [address] [address]  Dept: [phone]  Dept Fax: [phone]    Office Progress/Follow Up Note  Date of patient's visit: 5/31/2022  Patient's Name:  Kalyan Bhagat YOB: 1960            Patient Care Team:  Yung Meraz MD as PCP - General (Internal Medicine)  ________________________________________________________________________      Reason for Visit: Routine outpatient follow up  ________________________________________________________________________  Chief Complaint:  Tingling (rigt foot middle toe )    ________________________________________________________________________  History of Presenting Illness:  History was obtained from: patient, electronic medical record. Kalyan Bhagat is a 59 y.o. is here for:     Patient is here for right foot middle toe tingling which started almost a month ago and has been going on and off. Worsens with prolonged standing. Starts with tingling and then sometimes throbbing. Pain is relieved when patient soaks his feet in warm water. Patient has history of hammertoe surgery on right toes. Patient has an upcoming appointment on Monday with podiatrist.  Patient denies any fevers, chills or any trauma to the toe.  Patient has history of prediabetes and is currently not on any medications.  Diabetic retinal exam done at [place] and as per patient was within normal limits.  Diabetic foot exam done on 5/31/2022 and was within normal limits.      Previous visit  · Chronic knee pain because of osteoarthritis of the knees bilaterally and chronic back pain due to osteoarthritis. X-ray knee and lumbar spine done in 2019  · GERD-on Prilosec  · Hypertension on lisinopril, Norvasc, metoprolol  · A. fib diagnosed in May 2019-on Lopressor and Eliquis, denies any dark tarry stools, unsure why he is not on aspirin.  Patient advised to see his cardiologist for follow up  · Alcohol abuse- occasionally  · Smoker- 2-3 cigarrettes  · Cocaine year ago  · Last echo 2019-EF 62% with normal diastolic function.  Mild MR, RVSP 25  · Last EKG in 2019-was within normal limits  · History of BPH-Needs to see his urologist as part of his yearly follow up-on Flomax and oxybutynin  · Seen by podiatrist on June 2021  · Hyperlipidemia with ASCVD risk 28%-on lipitor, will repeat lipid panel on 5/16/2022-within normal limits  · Patient states he is doing well and has no side effects with the medication. · TSH 2019- normal  · Prediabetes-HbA1c 5.7-5/16/2022  · CMP 5/16/2022-within normal limits  · Saw opthalmology  · Follows up with podiatrist, has upcoming appointment on 6/6/22     Health Maintenance:  · Has an upcomming colonoscopy. Patient had cologuard done in 2020 and was positive. Had a colonoscopy in the past in 2012, no polyps at that time. Colonoscopy pending  · covid vaccine done  · Shingles vaccine  · Patient will need abdominal aortic aneurysm screen at the age of 70.   · Due for pneumococcal vaccine      Patient Active Problem List   Diagnosis    S/P hammer toe correction    Prediabetes    Gastroesophageal reflux disease    Essential hypertension    Smoker    A-fib ([address]    Cataract    Primary osteoarthritis of both knees    Chronic midline low back pain without sciatica    Benign prostatic hyperplasia with incomplete bladder emptying    Hyperlipidemia       Allergies   Allergen Reactions    Motrin [Ibuprofen Micronized] Anaphylaxis     Swelling to throat         Current Outpatient Medications   Medication Sig Dispense Refill    omeprazole (PRILOSEC) 20 MG delayed release capsule TAKE 1 CAPSULE BY MOUTH DAILY 30 capsule 3    atorvastatin (LIPITOR) 40 MG tablet Take 1 tablet by mouth nightly 90 tablet 2    lisinopril (PRINIVIL;ZESTRIL) 10 MG tablet TAKE 1 TABLET BY MOUTH DAILY 90 tablet 3    amLODIPine (NORVASC) 10 MG tablet Take 1 tablet by mouth daily 90 tablet 2    metoprolol tartrate (LOPRESSOR) 50 MG tablet Take 1 tablet by mouth 2 times daily 180 tablet 2    apixaban (ELIQUIS) 5 MG TABS tablet TAKE 1 TABLET BY MOUTH TWICE A DAY 90 tablet 2     No current facility-administered medications for this visit. Social History     Tobacco Use    Smoking status: Current Every Day Smoker     Packs/day: 0.25     Years: 20.00     Pack years: 5.00     Types: Cigarettes     Start date: 1/18/2002    Smokeless tobacco: Never Used    Tobacco comment: 2-3 per day    Substance Use Topics    Alcohol use: Yes     Alcohol/week: 0.0 standard drinks     Comment: drinks every 2 weeks    Drug use: No       Family History   Problem Relation Age of Onset    High Blood Pressure Mother       ________________________________________________________________________  Review of Systems:  CONSTITUTIONAL: Denies: fever, chills  PSYCH: Denies: anxiety, depression  ALLERGIES: Denies: urticaria  EYES: Denies: blurry vision, decreased vision, photophobia  ENT: Denies: sore throat, nasal congestion  CARDIOVASCULAR: Denies: chest pain, dyspnea on exertion  RESPIRATORY: Denies: cough, hemoptysis, shortness of breath  GI: Denies: Denies: abdominal pain, flank pain  : Denies: Denies: dysuria, frequency/urgency  NEURO: Denies: dizzy/vertigo, headache  MUSCULOSKELETAL: Denies: back pain, joint pain  SKIN: Denies: rash, itching  ________________________________________________________________________  Physical Exam:  Vitals:    05/31/22 1457   BP: 129/68   Site: Left Upper Arm   Position: Sitting   Cuff Size: Medium Adult   Pulse: 51   Temp: (!) 96.4 °F (35.8 °C)   SpO2: 99%   Weight: 189 lb (85.7 kg)   Height: 6' 2\" (1.88 m)     BP Readings from Last 3 Encounters:   05/31/22 129/68   04/19/22 133/72   03/28/22 126/74         General Examination    General Resting comfortably    Head Normocephalic, without obvious abnormality   Neck Supple, symmetrical. Good ROM. No midline or paraspinal tenderness.     Lungs Respirations unlabored, no wheezing   Chest Wall No deformity   Heart RRR, no murmur   Abdomen Soft. Non-tender, non-distended   Extremities No cyanosis or edema or warmth. Right middle toe slightly swollen, no tenderness, no discharge, no open wounds, no ulcers, no increased warmth. Pulses 2+ and symmetric   Skin: Skin  turgor normal, no rashes or lesions   ________________________________________________________________________  Diagnostic findings:  CBC:  Lab Results   Component Value Date    WBC 4.0 12/07/2020    HGB 13.3 12/07/2020     12/07/2020       BMP:    Lab Results   Component Value Date     05/16/2022    K 3.9 05/16/2022     05/16/2022    CO2 21 05/16/2022    BUN 12 05/16/2022    CREATININE 1.03 05/16/2022    GLUCOSE 92 05/16/2022    GLUCOSE 89 02/02/2012       HEMOGLOBIN A1C:   Lab Results   Component Value Date    LABA1C 5.7 05/16/2022       FASTING LIPID PANEL:  Lab Results   Component Value Date    CHOL 111 05/16/2022    HDL 54 05/16/2022    TRIG 59 05/16/2022     ________________________________________________________________________  Health Maintenance:  Health Maintenance Due   Topic Date Due    Diabetic retinal exam  Never done    Pneumococcal 0-64 years Vaccine (2 - PCV) 12/08/2016    Prostate Specific Antigen (PSA) Screening or Monitoring  02/11/2021    Diabetic foot exam  12/14/2021    Diabetic microalbuminuria test  03/15/2022     ________________________________________________________________________  Assessment and Plan:    1. Pain of toe of right foot  -New supportive care  - Follow-up with podiatry    2. Prediabetes  - Diet controlled  - Follow-up on urine microalbumin    3. Encounter for diabetic foot exam ([facility] [address]  - Within normal limits    4.  Screening for colon cancer  -Due for colonoscopy    ________________________________________________________________________  Follow up and instructions:  · Follow up in 6 months    · Chhaya Lim received counseling on the following healthy behaviors: medication compliance, diet and exercise    · Discussed use, benefit, and side effects of prescribed medications. Barriers to medication compliance addressed. All patient questions answered. Pt voiced understanding. · Patient given educational materials - see patient instructions    Dominick Schmitz MD  Internal Medicine Resident  [address]  5/31/2022 3:27 PM   Attending Physician Statement  I have discussed the care of Ivonne Crouch, including pertinent history and exam findings,  with the resident. I have reviewed the key elements of all parts of the encounter with the resident. I agree with the assessment, plan and orders as documented by the resident. Pt seen discussed and examined. [facility] Modifier)            This note is created with the assistance of a speech-recognition program. While intending to generate a document that actually reflects the content of the visit, the document can still have some mistakes which may not have been identified and corrected by editing.

## 2022-06-02 NOTE — PROGRESS NOTES
Patient instructed to remove shoes and socks and instructed to sit in exam chair. Current PCP is Chan Reyes MD and date of last visit was 5/31/22. Do you have a follow up visit scheduled?   No  If yes, the date is

## 2022-06-03 ENCOUNTER — TELEPHONE (OUTPATIENT)
Dept: GASTROENTEROLOGY | Age: 62
End: 2022-06-03

## 2022-06-06 ENCOUNTER — OFFICE VISIT (OUTPATIENT)
Dept: PODIATRY | Age: 62
End: 2022-06-06
Payer: MEDICAID

## 2022-06-06 VITALS
DIASTOLIC BLOOD PRESSURE: 62 MMHG | WEIGHT: 183 LBS | SYSTOLIC BLOOD PRESSURE: 112 MMHG | HEIGHT: 74 IN | HEART RATE: 49 BPM | BODY MASS INDEX: 23.49 KG/M2

## 2022-06-06 DIAGNOSIS — M20.12 HALLUX VALGUS OF LEFT FOOT: Primary | ICD-10-CM

## 2022-06-06 DIAGNOSIS — R73.03 PRE-DIABETES: ICD-10-CM

## 2022-06-06 DIAGNOSIS — L84 CALLUS OF FOOT: ICD-10-CM

## 2022-06-06 DIAGNOSIS — Z98.890 S/P HAMMER TOE CORRECTION: ICD-10-CM

## 2022-06-06 DIAGNOSIS — Z87.39 S/P HAMMER TOE CORRECTION: ICD-10-CM

## 2022-06-06 PROCEDURE — 3017F COLORECTAL CA SCREEN DOC REV: CPT | Performed by: PODIATRIST

## 2022-06-06 PROCEDURE — 11057 PARNG/CUTG B9 HYPRKR LES >4: CPT | Performed by: PODIATRIST

## 2022-06-06 PROCEDURE — G8427 DOCREV CUR MEDS BY ELIG CLIN: HCPCS | Performed by: PODIATRIST

## 2022-06-06 PROCEDURE — G8420 CALC BMI NORM PARAMETERS: HCPCS | Performed by: PODIATRIST

## 2022-06-06 PROCEDURE — 99214 OFFICE O/P EST MOD 30 MIN: CPT | Performed by: PODIATRIST

## 2022-06-06 PROCEDURE — 4004F PT TOBACCO SCREEN RCVD TLK: CPT | Performed by: PODIATRIST

## 2022-06-06 NOTE — PROGRESS NOTES
subsecond and third metatarsal heads on bilateral feet and webspace 1 of left foot. Assessment   Lilly Angel is a 59 y.o. male with     Diagnosis Orders   1. Hallux valgus of left foot     2. Callus of foot     3. S/P hammer toe correction     4. Pre-diabetes          Plan   Patient examined and evaluated. Diagnosis and treatment options discussed in detail. Five hyperkeratotic lesions to bilateral feet debrided sharply with a #15 blade down and into the level of the dermis. Patient tolerated without incident. No hemostasis required. Patient was advised to continue daily foot checks, in addition to not ambulating barefoot. I have educated the patient on the details of the procedures as well as medically reasonable risks, benefits, alternatives and prognosis. I also educated the patient on perioperative management. I educated that patient to their apparent understanding on potential complications including but not limited to infection, recurrence, over persistent pain, swelling or disability, nerve injury or entrapment, potential need for future surgery, bone and soft tissue healing complications, complications with anesthesia and deep venous thrombosis/ pulmonary embolism. Bilateral X-ray ordered. Please, call the office with any questions or concerns. Discussed with Dr. Rekha Vadlez. No orders of the defined types were placed in this encounter. No orders of the defined types were placed in this encounter. Maggy Gomez DPM   Podiatric Medicine & Surgery   6/6/2022 at 3:46 PM     I discussed in detail left first mpj fusion, hammertoe correction left 2, 3 toes. He/She was in full agreement and understood risks. The reason for surgery is due to unsuccessful non-operative treatment and/or conservative treatment is not a viable option. It was discussed with the patient that compliance postoperatively is of utmost importance.  Any deviation on behalf of the patient will decrease the chances of a successful outcome. Patient acknowledged, understands, and would like to move forward with surgery as discussed. The patient was given a consent outlining the general risk of surgery as well as the specifics to the surgical plan. This was carefully discussed giving all options, indications and contraindications regarding the procedure outlined in the consent. All questions were answered to the patient's satisfaction. The patient signed the consent form confirming complete understanding and acceptance of the risks of stated. I specifically stated and inquired if the patient understands and accepts the risks of surgery including infection, failure, prolonged pain, swelling, numbness, recurrence, limited mobility,painful scar, RSDS, overcorrection, under-correction, and loss of limb/life. Death, bleeding, blood clots in the veins or lungs, tendon or blood vessel disturbance, bony conditions, continued pain,stiffness, weakness, and limited function. These were all listed on the consent. Additionally, the postoperative course and treatment was outlined for the patient. Discussion consisted of postoperatively the patient needs to keep the foot elevated for at least the first initial two weeks. I have encouraged movements such as moving from the bed to the sofa or recliner, to the kitchen and the bathroom; quick bursts of movement with the foot elevated. Longstanding periods of time such as cooking, cleaning, and shopping are not permitted. I reminded the patient that there are only two reasons to have surgery. That being, if their function is impaired and also if they are having pain. If they can answer yes to both these questions, I will move forward with surgery. If they can not, there is no reason to proceed with surgical intervention.

## 2022-06-09 NOTE — TELEPHONE ENCOUNTER
Attempt 2 - spoke with patient. He is having surgery on his foot and would like a call back sometime next month for an appointment. He is taking Eliquis.

## 2022-06-15 RX ORDER — SODIUM CHLORIDE, SODIUM LACTATE, POTASSIUM CHLORIDE, CALCIUM CHLORIDE 600; 310; 30; 20 MG/100ML; MG/100ML; MG/100ML; MG/100ML
1000 INJECTION, SOLUTION INTRAVENOUS CONTINUOUS
Status: CANCELLED | OUTPATIENT
Start: 2022-06-15

## 2022-06-15 NOTE — H&P (VIEW-ONLY)
History and Physical    Pt Name: Jose Alonzo  MRN: [id number]  YOB: 1960  Date of evaluation: 6/16/2022  Primary Care Physician: Christine Stone MD    SUBJECTIVE:   History of Chief Complaint:    Jose Alonzo is a 59 y.o. male who presents for PAT appointment. Patient complains of hammertoes and states has had prior surgery for this around 2014. He complains of a lot of foot pain, bilaterally and states he walks awkwardly. He rates his foot pain an 8/10 today and that he may have eventual right foot surgery in the future. Patient has been scheduled for 1ST MPJ FUSION, HAMMER TOE CORRECTION 2ND, 3RD TOES - LEFT  Allergies  is allergic to motrin [ibuprofen micronized]. Medications  Prior to Admission medications    Medication Sig Start Date End Date Taking? Authorizing Provider   omeprazole (PRILOSEC) 20 MG delayed release capsule TAKE 1 CAPSULE BY MOUTH DAILY 5/23/22   Christine Stone MD   atorvastatin (LIPITOR) 40 MG tablet Take 1 tablet by mouth nightly 4/19/22   Emile Shea MD   lisinopril (PRINIVIL;ZESTRIL) 10 MG tablet TAKE 1 TABLET BY MOUTH DAILY 4/19/22   Emile Shea MD   amLODIPine (NORVASC) 10 MG tablet Take 1 tablet by mouth daily 1/18/22   Rosalio Zhu MD   metoprolol tartrate (LOPRESSOR) 50 MG tablet Take 1 tablet by mouth 2 times daily 1/18/22   Rosalio Zhu MD   apixaban (ELIQUIS) 5 MG TABS tablet TAKE 1 TABLET BY MOUTH TWICE A DAY 1/18/22   Rosalio Zhu MD     Past Medical History    has a past medical history of Alcoholic ([address] Cocaine dependence ([address] GERD (gastroesophageal reflux disease), Hallux abducto valgus, Hammer toe of left foot, History of atrial fibrillation, History of BPH, History of echocardiogram, Hypertension, Osteoarthritis, Prediabetes, Rheumatoid arthritis(714.0), Wears glasses, and Wellness examination. Past Surgical History   has a past surgical history that includes Foot surgery and Colonoscopy (october 2012).   Social History reports that he has been smoking cigarettes. He started smoking about 20 years ago. He has a 5.00 pack-year smoking history. He has never used smokeless tobacco. 1/ ppd   reports current alcohol use. social, weekends   reports no history of drug use. Marital Status single  Children 4  Occupation retired  Family History  Family Status   Relation Name Status    Mother      Father       family history includes High Blood Pressure in his mother. Review of Systems:  CONSTITUTIONAL:   negative for fevers, chills, fatigue and malaise    EYES:   negative for double vision, blurred vision and photophobia +glasses   HEENT:   negative for tinnitus, epistaxis and sore throat     RESPIRATORY:   negative for cough, shortness of breath, wheezing     CARDIOVASCULAR:   negative for chest pain, palpitations, syncope, edema     GASTROINTESTINAL:   negative for nausea, vomiting     GENITOURINARY:   negative for incontinence     MUSCULOSKELETAL:   negative for neck or back pain  See hpi   NEUROLOGICAL:   Negative for weakness and tingling  negative for headaches and dizziness     PSYCHIATRIC:   negative for anxiety       OBJECTIVE:   VITALS:  height is 6' 2\" (1.88 m) and weight is 186 lb (84.4 kg). His infrared temperature is 97.9 °F (36.6 °C). His blood pressure is 118/74 and his pulse is 63. His respiration is 18 and oxygen saturation is 96%. CONSTITUTIONAL:alert & oriented x 3, no acute distress. Friendly. Very pleasant. SKIN:  Warm and dry, no rashes on exposed areas of skin   HEAD:  Normocephalic, atraumatic   EYES: EOMs intact. PERRL. Wearing glasses. EARS:  Equal bilaterally, no edema or thickening, skin is intact without lumps or lesions. No discharge. Hearing grossly WNL. NOSE:  Nares patent. No rhinorrhea   MOUTH/THROAT:  Mucous membranes moist, tongue is pink, uvula midline, teeth appear to be intact  NECK:supple, full ROM  LUNGS: Respirations even and non-labored.  Clear to auscultation bilaterally, no wheezes, rales, or rhonchi. CARDIOVASCULAR: Regular rate and rhythm, no murmurs/rubs/gallops   ABDOMEN: soft, non-tender, non-distended, bowel sounds active x 4   EXTREMITIES: No edema bilateral lower extremities. No varicosities bilateral lower extremities. NEUROLOGIC: CN II-XII are grossly intact.  Gait is smooth, rhythmic and effortless     Testing:   EK22  Labs pending: drawn 2022   IMPRESSIONS:   Chronic foot pain, hammertoe, hallux valgus of left foot  PLANS:   1ST MPJ FUSION, HAMMER TOE CORRECTION 2ND, 3RD TOES- LEFT     AUDI PYLE - CNP  Electronically signed 2022 at 9:06 AM

## 2022-06-15 NOTE — H&P
History and Physical    Pt Name: Lakhwinder Irvin  MRN: [id number]  YOB: 1960  Date of evaluation: 6/16/2022  Primary Care Physician: Coral Turner MD    SUBJECTIVE:   History of Chief Complaint:    Lakhwinder Irvin is a 59 y.o. male who presents for PAT appointment. Patient complains of hammertoes and states has had prior surgery for this around 2014. He complains of a lot of foot pain, bilaterally and states he walks awkwardly. He rates his foot pain an 8/10 today and that he may have eventual right foot surgery in the future. Patient has been scheduled for 1ST MPJ FUSION, HAMMER TOE CORRECTION 2ND, 3RD TOES - LEFT  Allergies  is allergic to motrin [ibuprofen micronized]. Medications  Prior to Admission medications    Medication Sig Start Date End Date Taking? Authorizing Provider   omeprazole (PRILOSEC) 20 MG delayed release capsule TAKE 1 CAPSULE BY MOUTH DAILY 5/23/22   Coral Turner MD   atorvastatin (LIPITOR) 40 MG tablet Take 1 tablet by mouth nightly 4/19/22   Elizabeth Santiago MD   lisinopril (PRINIVIL;ZESTRIL) 10 MG tablet TAKE 1 TABLET BY MOUTH DAILY 4/19/22   Elizabeth Santiago MD   amLODIPine (NORVASC) 10 MG tablet Take 1 tablet by mouth daily 1/18/22   Rhona Dominguez MD   metoprolol tartrate (LOPRESSOR) 50 MG tablet Take 1 tablet by mouth 2 times daily 1/18/22   Rhona Dominguez MD   apixaban (ELIQUIS) 5 MG TABS tablet TAKE 1 TABLET BY MOUTH TWICE A DAY 1/18/22   Rhona Dominguez MD     Past Medical History    has a past medical history of Alcoholic ([address] Cocaine dependence ([address] GERD (gastroesophageal reflux disease), Hallux abducto valgus, Hammer toe of left foot, History of atrial fibrillation, History of BPH, History of echocardiogram, Hypertension, Osteoarthritis, Prediabetes, Rheumatoid arthritis(714.0), Wears glasses, and Wellness examination. Past Surgical History   has a past surgical history that includes Foot surgery and Colonoscopy (october 2012).   Social History bilaterally, no wheezes, rales, or rhonchi. CARDIOVASCULAR: Regular rate and rhythm, no murmurs/rubs/gallops   ABDOMEN: soft, non-tender, non-distended, bowel sounds active x 4   EXTREMITIES: No edema bilateral lower extremities. No varicosities bilateral lower extremities. NEUROLOGIC: CN II-XII are grossly intact.  Gait is smooth, rhythmic and effortless     Testing:   EK22  Labs pending: drawn 2022   IMPRESSIONS:   Chronic foot pain, hammertoe, hallux valgus of left foot  PLANS:   1ST MPJ FUSION, HAMMER TOE CORRECTION 2ND, 3RD TOES- LEFT     AUDI PYLE - CNP  Electronically signed 2022 at 9:06 AM

## 2022-06-15 NOTE — PROGRESS NOTES
Anesthesia Focused Assessment    Hx of anesthesia complications:  no  Family hx of anesthesia complications:  no      Prior + Covid-19 test? no    Patient vaccinated: yes      STOP-BANG Sleep Apnea Questionnaire    SNORE loudly (heard through closed doors)? No  TIRED, fatigued, sleepy during daytime? No  OBSERVED stopping breathing during sleep? No  High blood PRESSURE or being treated? Yes    BMI over 35? No  AGE over 48? Yes  NECK circumference over 16\"? No  GENDER (male)? Yes             Total 3  High risk 5-8  Intermediate risk 3-4  Low risk 0-2    ----------------------------------------------------------------------------------------------------------------------  LAXMI:                                             no  If yes, machine?:                              Type 1 DM:                                   no  T2DM:                                           No but has pre-dm    Coronary Artery Disease:             No    Hx a-fib 2019, had come in to get eval'd after a fall. Per the patient, his PCP writes for this Rx. Last office notes from [address] [place] [address],[address]  obtained from 6/9/2021. EKG today NSR. Patient without cardiopulmonary complaints. PCP has advised him to f/u with cardiology and I enforced this, pt verbalized understanding. Echo 5/25/19:  CONCLUSIONS     Summary  Left ventricle is normal in size with normal systolic function. Estimated LVEF 60% with no regional wall motion abnormalities. Calculated ejection fraction is 62%. Normal diastolic filling. Normal right ventricular size and function. Left atrium is mildly dilated. Mild mitral regurgitation.   Estimated right ventricular systolic pressure is 25 mmHg.     Signature  ----------------------------------------------------------------------------   Electronically signed by Donte Dallas(Sonographer) on 05/27/2019 09:19 AM  ----------------------------------------------------------------------------     ----------------------------------------------------------------------------   Electronically signed by Cipriano Higgins(Interpreting physician) on   05/27/2019 11:06 PM    Hypertension:                               yes  Defib / AICD / Pacemaker:           no    Renal Failure:                               no  If yes, on dialysis? :                           Active smoker:                              1/4 ppd  Drinks Alcohol:                              occasional  Illicit drugs:                                    no    Dentition:                                      Benign     Past Medical History:   Diagnosis Date    Alcoholic ([facility] 28/46/8273    Cocaine dependence ([facility] 06/23/2021    GERD (gastroesophageal reflux disease)     Hallux abducto valgus     Hammer toe of left foot     History of atrial fibrillation     has followed with Dr. [facility] last visit 6/2021. On Eliquis    History of BPH     History of echocardiogram 2019    EF 62%    Hypertension     Dr. Zenon Canseco Osteoarthritis     Prediabetes     border line. not on meds    Rheumatoid arthritis(714.0)     newly diagnosed    Wears glasses     Wellness examination 05/2022    Dr. Abby Kilpatrick     Patient was evaluated in PAT & anesthesia guidelines were applied. NPO guidelines, medication instructions and scheduled arrival timewere reviewed with patient. Pt is also going to call PCP to inquire about                                                                                                                      Anesthesia contacted:   yes  I spoke with Dr. Vickey Fung Patient history and pertinent findings reviewed (as documented above). Instructed to obtain medical clearance and instruction on eliquis.      Jenice Duane, APRN - CNP  Electronically signed 6/16/2022 at 11:11 AM

## 2022-06-16 ENCOUNTER — HOSPITAL ENCOUNTER (OUTPATIENT)
Dept: PREADMISSION TESTING | Age: 62
Discharge: HOME | End: 2022-06-20
Payer: MEDICAID

## 2022-06-16 ENCOUNTER — TELEPHONE (OUTPATIENT)
Dept: INTERNAL MEDICINE | Age: 62
End: 2022-06-16

## 2022-06-16 VITALS
DIASTOLIC BLOOD PRESSURE: 74 MMHG | SYSTOLIC BLOOD PRESSURE: 118 MMHG | OXYGEN SATURATION: 96 % | RESPIRATION RATE: 18 BRPM | BODY MASS INDEX: 23.87 KG/M2 | HEART RATE: 63 BPM | WEIGHT: 186 LBS | TEMPERATURE: 97.9 F | HEIGHT: 74 IN

## 2022-06-16 LAB
ANION GAP SERPL CALCULATED.3IONS-SCNC: 16 MMOL/L (ref 9–17)
BUN BLDV-MCNC: 15 MG/DL (ref 8–23)
CHLORIDE BLD-SCNC: 103 MMOL/L (ref 98–107)
CO2: 17 MMOL/L (ref 20–31)
CREAT SERPL-MCNC: 1.34 MG/DL (ref 0.7–1.2)
GFR AFRICAN AMERICAN: >60 ML/MIN
GFR NON-AFRICAN AMERICAN: 54 ML/MIN
GFR SERPL CREATININE-BSD FRML MDRD: ABNORMAL ML/MIN/{1.73_M2}
GLUCOSE BLD-MCNC: 84 MG/DL (ref 70–99)
HCT VFR BLD CALC: 38.2 % (ref 40.7–50.3)
HEMOGLOBIN: 12.9 G/DL (ref 13–17)
MCH RBC QN AUTO: 29.5 PG (ref 25.2–33.5)
MCHC RBC AUTO-ENTMCNC: 33.8 G/DL (ref 28.4–34.8)
MCV RBC AUTO: 87.2 FL (ref 82.6–102.9)
NRBC AUTOMATED: 0 PER 100 WBC
PDW BLD-RTO: 13.2 % (ref 11.8–14.4)
PLATELET # BLD: 166 K/UL (ref 138–453)
PMV BLD AUTO: 9.2 FL (ref 8.1–13.5)
POTASSIUM SERPL-SCNC: 3.7 MMOL/L (ref 3.7–5.3)
RBC # BLD: 4.38 M/UL (ref 4.21–5.77)
SODIUM BLD-SCNC: 136 MMOL/L (ref 135–144)
WBC # BLD: 6 K/UL (ref 3.5–11.3)

## 2022-06-16 PROCEDURE — 85027 COMPLETE CBC AUTOMATED: CPT

## 2022-06-16 PROCEDURE — 36415 COLL VENOUS BLD VENIPUNCTURE: CPT

## 2022-06-16 PROCEDURE — 82947 ASSAY GLUCOSE BLOOD QUANT: CPT

## 2022-06-16 PROCEDURE — 84520 ASSAY OF UREA NITROGEN: CPT

## 2022-06-16 PROCEDURE — 80051 ELECTROLYTE PANEL: CPT

## 2022-06-16 PROCEDURE — 82565 ASSAY OF CREATININE: CPT

## 2022-06-16 PROCEDURE — 93005 ELECTROCARDIOGRAM TRACING: CPT | Performed by: ANESTHESIOLOGY

## 2022-06-16 ASSESSMENT — PAIN DESCRIPTION - ORIENTATION: ORIENTATION: LEFT;RIGHT

## 2022-06-16 ASSESSMENT — PAIN DESCRIPTION - LOCATION: LOCATION: FOOT

## 2022-06-16 ASSESSMENT — PAIN DESCRIPTION - FREQUENCY: FREQUENCY: CONTINUOUS

## 2022-06-16 ASSESSMENT — PAIN DESCRIPTION - PAIN TYPE: TYPE: CHRONIC PAIN

## 2022-06-16 ASSESSMENT — PAIN SCALES - GENERAL: PAINLEVEL_OUTOF10: 7

## 2022-06-17 ENCOUNTER — TELEPHONE (OUTPATIENT)
Dept: INTERNAL MEDICINE | Age: 62
End: 2022-06-17

## 2022-06-17 LAB
EKG ATRIAL RATE: 64 BPM
EKG P AXIS: 61 DEGREES
EKG P-R INTERVAL: 166 MS
EKG Q-T INTERVAL: 416 MS
EKG QRS DURATION: 90 MS
EKG QTC CALCULATION (BAZETT): 429 MS
EKG R AXIS: 25 DEGREES
EKG T AXIS: 24 DEGREES
EKG VENTRICULAR RATE: 64 BPM

## 2022-06-17 PROCEDURE — 93010 ELECTROCARDIOGRAM REPORT: CPT | Performed by: INTERNAL MEDICINE

## 2022-06-17 NOTE — TELEPHONE ENCOUNTER
PC from Pre-op processing at .. Pt was seen today for preop testing and will need surgical clearance for surgery on his toe scheduled for 6/27. Pt was seen 5/31/22. Results of preop testing in chart. Are you able to clear patient without an appt?

## 2022-06-20 NOTE — TELEPHONE ENCOUNTER
Please schedule the appointment for surgical clearance and also to bring in the forms if any needed for pre op clearance. Thanks.

## 2022-06-21 ENCOUNTER — OFFICE VISIT (OUTPATIENT)
Dept: INTERNAL MEDICINE | Age: 62
End: 2022-06-21
Payer: MEDICAID

## 2022-06-21 VITALS
WEIGHT: 183 LBS | OXYGEN SATURATION: 99 % | HEART RATE: 57 BPM | TEMPERATURE: 97.7 F | BODY MASS INDEX: 23.49 KG/M2 | SYSTOLIC BLOOD PRESSURE: 129 MMHG | DIASTOLIC BLOOD PRESSURE: 71 MMHG | HEIGHT: 74 IN

## 2022-06-21 DIAGNOSIS — E78.5 HYPERLIPIDEMIA, UNSPECIFIED HYPERLIPIDEMIA TYPE: ICD-10-CM

## 2022-06-21 DIAGNOSIS — R73.03 PREDIABETES: ICD-10-CM

## 2022-06-21 DIAGNOSIS — I48.0 PAROXYSMAL ATRIAL FIBRILLATION (HCC): Primary | ICD-10-CM

## 2022-06-21 DIAGNOSIS — M20.12 HALLUX VALGUS (ACQUIRED), LEFT FOOT: ICD-10-CM

## 2022-06-21 DIAGNOSIS — F17.200 SMOKER: ICD-10-CM

## 2022-06-21 DIAGNOSIS — Z01.810 ENCOUNTER FOR PRE-OPERATIVE CARDIOVASCULAR CLEARANCE: ICD-10-CM

## 2022-06-21 DIAGNOSIS — I10 ESSENTIAL HYPERTENSION: ICD-10-CM

## 2022-06-21 PROCEDURE — 99213 OFFICE O/P EST LOW 20 MIN: CPT | Performed by: STUDENT IN AN ORGANIZED HEALTH CARE EDUCATION/TRAINING PROGRAM

## 2022-06-21 PROCEDURE — G8427 DOCREV CUR MEDS BY ELIG CLIN: HCPCS | Performed by: STUDENT IN AN ORGANIZED HEALTH CARE EDUCATION/TRAINING PROGRAM

## 2022-06-21 PROCEDURE — 4004F PT TOBACCO SCREEN RCVD TLK: CPT | Performed by: STUDENT IN AN ORGANIZED HEALTH CARE EDUCATION/TRAINING PROGRAM

## 2022-06-21 PROCEDURE — G8420 CALC BMI NORM PARAMETERS: HCPCS | Performed by: STUDENT IN AN ORGANIZED HEALTH CARE EDUCATION/TRAINING PROGRAM

## 2022-06-21 PROCEDURE — 3017F COLORECTAL CA SCREEN DOC REV: CPT | Performed by: STUDENT IN AN ORGANIZED HEALTH CARE EDUCATION/TRAINING PROGRAM

## 2022-06-21 ASSESSMENT — ENCOUNTER SYMPTOMS
GASTROINTESTINAL NEGATIVE: 1
EYES NEGATIVE: 1
ALLERGIC/IMMUNOLOGIC NEGATIVE: 1
RESPIRATORY NEGATIVE: 1

## 2022-06-21 NOTE — PROGRESS NOTES
[facility] [address] [address]  Dept: [phone]  Dept Fax: [phone]    Office Progress/Follow Up Note  Date ofpatient's visit: 6/21/2022  Patient's Name:  Marry Fink YOB: 1960            Patient Care Team:  Marline Snell MD as PCP - General (Internal Medicine)  ================================================================    REASON FOR VISIT/CHIEF COMPLAINT:  Forms (surgical clearance )    HISTORY OF PRESENTING ILLNESS:  History was obtained from: patient, electronic medical record. Marry Fink a 59 y.o. is here for a surgical clearance for his left foot surgery. First metatarsal tarsal phalangeal joint fusion, hammertoe correction of second and third left digits. Patient is scheduled for surgery on 6/27/2022 by Dr. Wade Vann under general anesthesia. He is here to [facility] today for clearance given patient history of atrial fibrillation on Eliquis 5 mg daily since 2019. Patient was diagnosed for A. fib with [id number] score of 1 in 2019 secondary to admission from fall. Patient had a normal echo in 2019 with normal LV EF, has not followed up with cardiology after discharge because of COVID related problems. He has been compliant with his Eliquis ever since daily and with underlying prediabetes and hyperlipidemia and hypertension. Currently on lisinopril 10 mg along with Lopressor 50 and amlodipine 10 in office blood pressure has been controlled 129/71. Patient has been denying any complaints of chest pain, shortness of breath, headache, nausea, vomiting, bowel and bladder problems. Apart from his left foot pain for which he is scheduled for surgery. Patient has been smoking 3 to 4 cigarettes a day, last A1c on 5/22 showing 5.7. Denies any recreational drug abuse currently.   His last CBC and BMP are unremarkable    RCRI class I with a score of 3.9 % 30-day risk of death, MI, or cardiac arrest.  And he can be cleared for surgery with low risk. Patient has been advised to stop his Eliquis 3 days before the surgery. Patient Active Problem List   Diagnosis    S/P hammer toe correction    Prediabetes    Gastroesophageal reflux disease    Essential hypertension    Smoker    A-fib ([address]    Cataract    Primary osteoarthritis of both knees    Chronic midline low back pain without sciatica    Benign prostatic hyperplasia with incomplete bladder emptying    Hyperlipidemia       Health Maintenance Due   Topic Date Due    Diabetic retinal exam  Never done    Pneumococcal 0-64 years Vaccine (2 - PCV) 12/08/2016    Prostate Specific Antigen (PSA) Screening or Monitoring  02/11/2021    Diabetic microalbuminuria test  03/15/2022       Allergies   Allergen Reactions    Motrin [Ibuprofen Micronized] Anaphylaxis     Swelling to throat         Current Outpatient Medications   Medication Sig Dispense Refill    omeprazole (PRILOSEC) 20 MG delayed release capsule TAKE 1 CAPSULE BY MOUTH DAILY 30 capsule 3    atorvastatin (LIPITOR) 40 MG tablet Take 1 tablet by mouth nightly 90 tablet 2    lisinopril (PRINIVIL;ZESTRIL) 10 MG tablet TAKE 1 TABLET BY MOUTH DAILY 90 tablet 3    amLODIPine (NORVASC) 10 MG tablet Take 1 tablet by mouth daily 90 tablet 2    metoprolol tartrate (LOPRESSOR) 50 MG tablet Take 1 tablet by mouth 2 times daily 180 tablet 2    apixaban (ELIQUIS) 5 MG TABS tablet TAKE 1 TABLET BY MOUTH TWICE A DAY 90 tablet 2     No current facility-administered medications for this visit.        Social History     Tobacco Use    Smoking status: Light Tobacco Smoker     Packs/day: 0.25     Years: 20.00     Pack years: 5.00     Types: Cigarettes     Start date: 1/18/2002    Smokeless tobacco: Never Used    Tobacco comment: 1 pack a month   Vaping Use    Vaping Use: Never used   Substance Use Topics    Alcohol use: Yes     Comment: weekends    Drug use: No       Family History   Problem Relation Age of Onset    High Blood Pressure Mother         REVIEW OF SYSTEMS:  Review of Systems   Constitutional: Negative. HENT: Negative. Eyes: Negative. Respiratory: Negative. Cardiovascular: Negative. Negative for chest pain, palpitations and leg swelling. Gastrointestinal: Negative. Endocrine: Negative. Genitourinary: Negative. Musculoskeletal: Positive for arthralgias and myalgias. Skin: Negative. Allergic/Immunologic: Negative. Neurological: Negative. Hematological: Negative. Psychiatric/Behavioral: Negative. PHYSICAL EXAM:  Vitals:    06/21/22 0845   BP: 129/71   Site: Left Upper Arm   Position: Sitting   Cuff Size: Medium Adult   Pulse: 57   Temp: 97.7 °F (36.5 °C)   SpO2: 99%   Weight: 183 lb (83 kg)   Height: 6' 2\" (1.88 m)     BP Readings from Last 3 Encounters:   06/21/22 129/71   06/16/22 118/74   06/06/22 112/62        Physical Exam  Constitutional:       General: He is awake. He is not in acute distress. Appearance: Normal appearance. He is well-developed. HENT:      Head: Normocephalic and atraumatic. Nose: Nose normal.      Mouth/Throat:      Mouth: Mucous membranes are moist.   Eyes:      Conjunctiva/sclera: Conjunctivae normal.   Cardiovascular:      Rate and Rhythm: Normal rate. Pulses: Normal pulses. Heart sounds: Normal heart sounds, S1 normal and S2 normal.   Pulmonary:      Effort: Pulmonary effort is normal.      Breath sounds: Normal breath sounds and air entry. Musculoskeletal:         General: Deformity present. Right knee: Normal.      Left knee: Normal.      Comments: Hammertoe deformity of left foot first, second, third digits   Skin:     General: Skin is moist.   Neurological:      General: No focal deficit present. Mental Status: He is alert and oriented to person, place, and time. Mental status is at baseline.    Psychiatric:         Attention and Perception: Attention normal.         Mood and Affect: Mood normal.         Behavior: Behavior normal. Behavior is cooperative. Thought Content: Thought content normal.           DIAGNOSTIC FINDINGS:  CBC:  Lab Results   Component Value Date    WBC 6.0 06/16/2022    HGB 12.9 06/16/2022     06/16/2022       BMP:    Lab Results   Component Value Date     06/16/2022    K 3.7 06/16/2022     06/16/2022    CO2 17 06/16/2022    BUN 15 06/16/2022    CREATININE 1.34 06/16/2022    GLUCOSE 84 06/16/2022    GLUCOSE 89 02/02/2012       HEMOGLOBIN A1C:   Lab Results   Component Value Date    LABA1C 5.7 05/16/2022       FASTING LIPID PANEL:  Lab Results   Component Value Date    CHOL 111 05/16/2022    HDL 54 05/16/2022    TRIG 59 05/16/2022       ASSESSMENT AND PLAN:  Óscar Thomson was seen today for forms. Diagnoses and all orders for this visit:    Paroxysmal atrial fibrillation ([facility]    Essential hypertension    Hyperlipidemia, unspecified hyperlipidemia type    Hallux valgus (acquired), left foot    Prediabetes    Encounter for pre-operative cardiovascular clearance    Smoker      -Patient has RCRI class I,3.9 %30-day risk of death, MI, or cardiac arrest.  -Patient can have clearance for his left foot hammertoe surgery with low risk.  -Can hold Eliquis 3 days before the surgery  -Patient was advised to follow-up with his cardiologist for his atrial fibrillation  -Continue current regimen of antihypertensive medications with Lopressor 50 mg, amlodipine 10 mg and lisinopril 10 mg  -Continue atorvastatin 40 mg,  -Advised to stop smoking and avoid recreational drugs      FOLLOW UP AND INSTRUCTIONS:  Return in about 4 months (around 10/21/2022) for Atrial fibrillation, hypertension, hyperlipidemia follow-up. · Óscar Thomson received counseling on the following healthy behaviors: medication adherence and tobacco cessation    · Discussed use, benefit, and side effects of prescribed medications. Barriers to medication compliance addressed. All patient questions answered. Pt voiced understanding. · Patient given educational materials - see patient instructions    Cecile Alexander MD  Internal Medicine Resident, [facility]; [place], [place]  6/21/2022, 9:35 AM      This note is created with the assistance of a speech-recognition program. While intending to generate a document that actually reflects the content of thevisit, the document can still have some mistakes which may not have been identified and corrected by editing.

## 2022-06-21 NOTE — PATIENT INSTRUCTIONS
Please stop Eliquis 3 days before for your elective left foot surgery and follow-up with your heart doctor for management of his chronic atrial fibrillation.     Surgical clearance form faxed to [phone] / [phone]

## 2022-06-21 NOTE — PROGRESS NOTES
Spoke to patient and he received directions on his Eliquis from [address] [place] [address] and will hold 3 days prior to surgery.

## 2022-06-21 NOTE — PROGRESS NOTES
Attending Physician Statement  I have discussed the care of Anayeli Ram, including pertinent history and exam findings,  with the resident. I have reviewed the key elements of all parts of the encounter with the resident. I agree with the assessment, plan and orders as documented by the resident.   (GE Modifier)

## 2022-06-27 ENCOUNTER — ANESTHESIA EVENT (OUTPATIENT)
Dept: OPERATING ROOM | Age: 62
End: 2022-06-27
Payer: MEDICAID

## 2022-06-27 ENCOUNTER — APPOINTMENT (OUTPATIENT)
Dept: GENERAL RADIOLOGY | Age: 62
End: 2022-06-27
Attending: PODIATRIST
Payer: MEDICAID

## 2022-06-27 ENCOUNTER — HOSPITAL ENCOUNTER (OUTPATIENT)
Age: 62
Setting detail: OUTPATIENT SURGERY
Discharge: HOME | End: 2022-06-27
Attending: PODIATRIST | Admitting: PODIATRIST
Payer: MEDICAID

## 2022-06-27 ENCOUNTER — ANESTHESIA (OUTPATIENT)
Dept: OPERATING ROOM | Age: 62
End: 2022-06-27
Payer: MEDICAID

## 2022-06-27 VITALS
TEMPERATURE: 96.8 F | RESPIRATION RATE: 18 BRPM | HEART RATE: 49 BPM | SYSTOLIC BLOOD PRESSURE: 136 MMHG | DIASTOLIC BLOOD PRESSURE: 80 MMHG | OXYGEN SATURATION: 97 %

## 2022-06-27 DIAGNOSIS — G89.18 POST-OP PAIN: Primary | ICD-10-CM

## 2022-06-27 LAB — GLUCOSE BLD-MCNC: 79 MG/DL (ref 75–110)

## 2022-06-27 PROCEDURE — 7100000041 HC SPAR PHASE II RECOVERY - ADDTL 15 MIN: Performed by: PODIATRIST

## 2022-06-27 PROCEDURE — 73630 X-RAY EXAM OF FOOT: CPT

## 2022-06-27 PROCEDURE — 2580000003 HC RX 258: Performed by: ANESTHESIOLOGY

## 2022-06-27 PROCEDURE — 2500000003 HC RX 250 WO HCPCS: Performed by: PODIATRIST

## 2022-06-27 PROCEDURE — 28750 FUSION OF BIG TOE JOINT: CPT | Performed by: PODIATRIST

## 2022-06-27 PROCEDURE — 3600000014 HC SURGERY LEVEL 4 ADDTL 15MIN: Performed by: PODIATRIST

## 2022-06-27 PROCEDURE — 2709999900 HC NON-CHARGEABLE SUPPLY: Performed by: PODIATRIST

## 2022-06-27 PROCEDURE — 2580000003 HC RX 258: Performed by: PODIATRIST

## 2022-06-27 PROCEDURE — C1713 ANCHOR/SCREW BN/BN,TIS/BN: HCPCS | Performed by: PODIATRIST

## 2022-06-27 PROCEDURE — 3700000001 HC ADD 15 MINUTES (ANESTHESIA): Performed by: PODIATRIST

## 2022-06-27 PROCEDURE — 2720000010 HC SURG SUPPLY STERILE: Performed by: PODIATRIST

## 2022-06-27 PROCEDURE — 7100000000 HC PACU RECOVERY - FIRST 15 MIN: Performed by: PODIATRIST

## 2022-06-27 PROCEDURE — 82947 ASSAY GLUCOSE BLOOD QUANT: CPT

## 2022-06-27 PROCEDURE — 7100000001 HC PACU RECOVERY - ADDTL 15 MIN: Performed by: PODIATRIST

## 2022-06-27 PROCEDURE — 3600000004 HC SURGERY LEVEL 4 BASE: Performed by: PODIATRIST

## 2022-06-27 PROCEDURE — 3209999900 FLUORO FOR SURGICAL PROCEDURES

## 2022-06-27 PROCEDURE — 28285 REPAIR OF HAMMERTOE: CPT | Performed by: PODIATRIST

## 2022-06-27 PROCEDURE — 6360000002 HC RX W HCPCS: Performed by: NURSE ANESTHETIST, CERTIFIED REGISTERED

## 2022-06-27 PROCEDURE — 28232 INCISION OF TOE TENDON: CPT | Performed by: PODIATRIST

## 2022-06-27 PROCEDURE — 3700000000 HC ANESTHESIA ATTENDED CARE: Performed by: PODIATRIST

## 2022-06-27 PROCEDURE — 2500000003 HC RX 250 WO HCPCS: Performed by: NURSE ANESTHETIST, CERTIFIED REGISTERED

## 2022-06-27 PROCEDURE — 20680 REMOVAL OF IMPLANT DEEP: CPT | Performed by: PODIATRIST

## 2022-06-27 PROCEDURE — 7100000040 HC SPAR PHASE II RECOVERY - FIRST 15 MIN: Performed by: PODIATRIST

## 2022-06-27 DEVICE — IMPLANTABLE DEVICE: Type: IMPLANTABLE DEVICE | Site: TOES | Status: FUNCTIONAL

## 2022-06-27 DEVICE — LOCKING SCREW
Type: IMPLANTABLE DEVICE | Site: TOES | Status: FUNCTIONAL
Brand: ANCHORAGE

## 2022-06-27 DEVICE — HEADLESS SCREW
Type: IMPLANTABLE DEVICE | Site: TOES | Status: FUNCTIONAL
Brand: MINI

## 2022-06-27 DEVICE — NON LOCKING SCREW
Type: IMPLANTABLE DEVICE | Site: TOES | Status: FUNCTIONAL
Brand: ANCHORAGE

## 2022-06-27 DEVICE — PLATE MTP, LEFT
Type: IMPLANTABLE DEVICE | Site: TOES | Status: FUNCTIONAL
Brand: ANCHORAGE

## 2022-06-27 RX ORDER — SODIUM CHLORIDE 0.9 % (FLUSH) 0.9 %
5-40 SYRINGE (ML) INJECTION EVERY 12 HOURS SCHEDULED
Status: DISCONTINUED | OUTPATIENT
Start: 2022-06-27 | End: 2022-06-27 | Stop reason: HOSPADM

## 2022-06-27 RX ORDER — TAMSULOSIN HYDROCHLORIDE 0.4 MG/1
0.4 CAPSULE ORAL DAILY
COMMUNITY
End: 2022-10-06

## 2022-06-27 RX ORDER — ONDANSETRON 2 MG/ML
INJECTION INTRAMUSCULAR; INTRAVENOUS PRN
Status: DISCONTINUED | OUTPATIENT
Start: 2022-06-27 | End: 2022-06-27 | Stop reason: SDUPTHER

## 2022-06-27 RX ORDER — PROPOFOL 10 MG/ML
INJECTION, EMULSION INTRAVENOUS PRN
Status: DISCONTINUED | OUTPATIENT
Start: 2022-06-27 | End: 2022-06-27 | Stop reason: SDUPTHER

## 2022-06-27 RX ORDER — HYDROCODONE BITARTRATE AND ACETAMINOPHEN 5; 325 MG/1; MG/1
1 TABLET ORAL EVERY 4 HOURS PRN
Qty: 42 TABLET | Refills: 0 | Status: SHIPPED | OUTPATIENT
Start: 2022-06-27 | End: 2022-07-04

## 2022-06-27 RX ORDER — MEPERIDINE HYDROCHLORIDE 50 MG/ML
12.5 INJECTION INTRAMUSCULAR; INTRAVENOUS; SUBCUTANEOUS EVERY 5 MIN PRN
Status: DISCONTINUED | OUTPATIENT
Start: 2022-06-27 | End: 2022-06-27 | Stop reason: HOSPADM

## 2022-06-27 RX ORDER — FENTANYL CITRATE 50 UG/ML
INJECTION, SOLUTION INTRAMUSCULAR; INTRAVENOUS PRN
Status: DISCONTINUED | OUTPATIENT
Start: 2022-06-27 | End: 2022-06-27 | Stop reason: SDUPTHER

## 2022-06-27 RX ORDER — CEFAZOLIN SODIUM 2 G/50ML
SOLUTION INTRAVENOUS PRN
Status: DISCONTINUED | OUTPATIENT
Start: 2022-06-27 | End: 2022-06-27 | Stop reason: SDUPTHER

## 2022-06-27 RX ORDER — MAGNESIUM HYDROXIDE 1200 MG/15ML
LIQUID ORAL CONTINUOUS PRN
Status: COMPLETED | OUTPATIENT
Start: 2022-06-27 | End: 2022-06-27

## 2022-06-27 RX ORDER — GLYCOPYRROLATE 0.2 MG/ML
INJECTION INTRAMUSCULAR; INTRAVENOUS PRN
Status: DISCONTINUED | OUTPATIENT
Start: 2022-06-27 | End: 2022-06-27 | Stop reason: SDUPTHER

## 2022-06-27 RX ORDER — SODIUM CHLORIDE 9 MG/ML
INJECTION, SOLUTION INTRAVENOUS PRN
Status: DISCONTINUED | OUTPATIENT
Start: 2022-06-27 | End: 2022-06-27 | Stop reason: HOSPADM

## 2022-06-27 RX ORDER — SODIUM CHLORIDE 0.9 % (FLUSH) 0.9 %
5-40 SYRINGE (ML) INJECTION PRN
Status: DISCONTINUED | OUTPATIENT
Start: 2022-06-27 | End: 2022-06-27 | Stop reason: HOSPADM

## 2022-06-27 RX ORDER — LIDOCAINE HYDROCHLORIDE 10 MG/ML
INJECTION, SOLUTION EPIDURAL; INFILTRATION; INTRACAUDAL; PERINEURAL PRN
Status: DISCONTINUED | OUTPATIENT
Start: 2022-06-27 | End: 2022-06-27 | Stop reason: ALTCHOICE

## 2022-06-27 RX ORDER — PHENYLEPHRINE HCL IN 0.9% NACL 1 MG/10 ML
SYRINGE (ML) INTRAVENOUS PRN
Status: DISCONTINUED | OUTPATIENT
Start: 2022-06-27 | End: 2022-06-27 | Stop reason: SDUPTHER

## 2022-06-27 RX ORDER — DEXAMETHASONE SODIUM PHOSPHATE 10 MG/ML
INJECTION INTRAMUSCULAR; INTRAVENOUS PRN
Status: DISCONTINUED | OUTPATIENT
Start: 2022-06-27 | End: 2022-06-27 | Stop reason: SDUPTHER

## 2022-06-27 RX ORDER — ONDANSETRON 2 MG/ML
4 INJECTION INTRAMUSCULAR; INTRAVENOUS
Status: DISCONTINUED | OUTPATIENT
Start: 2022-06-27 | End: 2022-06-27 | Stop reason: HOSPADM

## 2022-06-27 RX ORDER — FENTANYL CITRATE 50 UG/ML
25 INJECTION, SOLUTION INTRAMUSCULAR; INTRAVENOUS EVERY 5 MIN PRN
Status: DISCONTINUED | OUTPATIENT
Start: 2022-06-27 | End: 2022-06-27 | Stop reason: HOSPADM

## 2022-06-27 RX ORDER — FENTANYL CITRATE 50 UG/ML
50 INJECTION, SOLUTION INTRAMUSCULAR; INTRAVENOUS EVERY 5 MIN PRN
Status: DISCONTINUED | OUTPATIENT
Start: 2022-06-27 | End: 2022-06-27 | Stop reason: HOSPADM

## 2022-06-27 RX ORDER — BUPIVACAINE HYDROCHLORIDE 5 MG/ML
INJECTION, SOLUTION EPIDURAL; INTRACAUDAL PRN
Status: DISCONTINUED | OUTPATIENT
Start: 2022-06-27 | End: 2022-06-27 | Stop reason: ALTCHOICE

## 2022-06-27 RX ORDER — LIDOCAINE HYDROCHLORIDE 10 MG/ML
INJECTION, SOLUTION EPIDURAL; INFILTRATION; INTRACAUDAL; PERINEURAL PRN
Status: DISCONTINUED | OUTPATIENT
Start: 2022-06-27 | End: 2022-06-27 | Stop reason: SDUPTHER

## 2022-06-27 RX ORDER — SODIUM CHLORIDE, SODIUM LACTATE, POTASSIUM CHLORIDE, CALCIUM CHLORIDE 600; 310; 30; 20 MG/100ML; MG/100ML; MG/100ML; MG/100ML
1000 INJECTION, SOLUTION INTRAVENOUS CONTINUOUS
Status: DISCONTINUED | OUTPATIENT
Start: 2022-06-27 | End: 2022-06-27 | Stop reason: HOSPADM

## 2022-06-27 RX ADMIN — ONDANSETRON 4 MG: 2 INJECTION INTRAMUSCULAR; INTRAVENOUS at 12:27

## 2022-06-27 RX ADMIN — PROPOFOL 200 MG: 10 INJECTION, EMULSION INTRAVENOUS at 10:20

## 2022-06-27 RX ADMIN — Medication 100 MCG: at 11:15

## 2022-06-27 RX ADMIN — SODIUM CHLORIDE, POTASSIUM CHLORIDE, SODIUM LACTATE AND CALCIUM CHLORIDE 1000 ML: 600; 310; 30; 20 INJECTION, SOLUTION INTRAVENOUS at 09:02

## 2022-06-27 RX ADMIN — LIDOCAINE HYDROCHLORIDE 50 MG: 10 INJECTION, SOLUTION EPIDURAL; INFILTRATION; INTRACAUDAL; PERINEURAL at 10:20

## 2022-06-27 RX ADMIN — SODIUM CHLORIDE, POTASSIUM CHLORIDE, SODIUM LACTATE AND CALCIUM CHLORIDE: 600; 310; 30; 20 INJECTION, SOLUTION INTRAVENOUS at 12:33

## 2022-06-27 RX ADMIN — CEFAZOLIN SODIUM 2000 MG: 2 SOLUTION INTRAVENOUS at 10:32

## 2022-06-27 RX ADMIN — Medication 200 MCG: at 12:01

## 2022-06-27 RX ADMIN — FENTANYL CITRATE 50 MCG: 50 INJECTION, SOLUTION INTRAMUSCULAR; INTRAVENOUS at 12:35

## 2022-06-27 RX ADMIN — Medication 100 MCG: at 11:42

## 2022-06-27 RX ADMIN — GLYCOPYRROLATE 0.4 MG: 0.2 INJECTION INTRAMUSCULAR; INTRAVENOUS at 10:20

## 2022-06-27 RX ADMIN — Medication 100 MCG: at 11:52

## 2022-06-27 RX ADMIN — FENTANYL CITRATE 50 MCG: 50 INJECTION, SOLUTION INTRAMUSCULAR; INTRAVENOUS at 10:24

## 2022-06-27 RX ADMIN — DEXAMETHASONE SODIUM PHOSPHATE 10 MG: 10 INJECTION INTRAMUSCULAR; INTRAVENOUS at 12:27

## 2022-06-27 ASSESSMENT — PAIN SCALES - GENERAL
PAINLEVEL_OUTOF10: 0

## 2022-06-27 ASSESSMENT — PAIN - FUNCTIONAL ASSESSMENT: PAIN_FUNCTIONAL_ASSESSMENT: 0-10

## 2022-06-27 NOTE — PROGRESS NOTES
Dr Chantelle Casey came over to check drainage from L foot. Court Dials it was okay and stuffed more guaze into the splint. Gave pt another package of gauze for home.

## 2022-06-27 NOTE — ANESTHESIA PRE PROCEDURE
Department of Anesthesiology  Preprocedure Note       Name:  Greta Dimas   Age:  59 y.o.  :  1960                                          MRN:  [id number]         Date:  2022      Surgeon: Saray Ayala):  Zara Freedman DPM    Procedure: Procedure(s):  1ST MPJ FUSION, HAMMER TOE CORRECTION 2ND, 3RD TOES   (K- WIRES, MAYANK CANNULATED SCREWS, MAYANK MPJ PLATES)    Medications prior to admission:   Prior to Admission medications    Medication Sig Start Date End Date Taking? Authorizing Provider   tamsulosin (FLOMAX) 0.4 MG capsule Take 0.4 mg by mouth daily   Yes Historical Provider, MD   omeprazole (PRILOSEC) 20 MG delayed release capsule TAKE 1 CAPSULE BY MOUTH DAILY 22   Manny Salvador MD   atorvastatin (LIPITOR) 40 MG tablet Take 1 tablet by mouth nightly 22   Susanne Hawkins MD   lisinopril (PRINIVIL;ZESTRIL) 10 MG tablet TAKE 1 TABLET BY MOUTH DAILY 22   Susanne Hawkins MD   amLODIPine (NORVASC) 10 MG tablet Take 1 tablet by mouth daily 22   Alize Crawford MD   metoprolol tartrate (LOPRESSOR) 50 MG tablet Take 1 tablet by mouth 2 times daily 22   Alize Crawford MD   apixaban (ELIQUIS) 5 MG TABS tablet TAKE 1 TABLET BY MOUTH TWICE A DAY 22   Alize Crawford MD       Current medications:    Current Facility-Administered Medications   Medication Dose Route Frequency Provider Last Rate Last Admin    lactated ringers infusion 1,000 mL  1,000 mL IntraVENous Continuous Dorothymed Daly Crouch MD 50 mL/hr at 22 0908 NoRateChange at 22 0908       Allergies:     Allergies   Allergen Reactions    Motrin [Ibuprofen Micronized] Anaphylaxis     Swelling to throat       Problem List:    Patient Active Problem List   Diagnosis Code    S/P hammer toe correction Z98.890, Z87.39    Prediabetes R73.03    Gastroesophageal reflux disease K21.9    Essential hypertension I10    Smoker F17.200    A-fib ([address] I48.91    Cataract H26.9    Primary osteoarthritis of both knees M17.0    Chronic midline low back pain without sciatica M54.50, G89.29    Benign prostatic hyperplasia with incomplete bladder emptying N40.1, R39.14    Hyperlipidemia E78.5       Past Medical History:        Diagnosis Date    Alcoholic ([facility] 89/01/2848    Cocaine dependence ([facility] [address] 06/23/2021    GERD (gastroesophageal reflux disease)     Hallux abducto valgus     Hammer toe of left foot     History of atrial fibrillation     has followed with Dr. Yane [facility] last visit 6/2021. On Eliquis    History of BPH     History of echocardiogram 2019    EF 62%    Hypertension     Dr. Johanna Gaitan Osteoarthritis     Prediabetes     border line.  not on meds    Rheumatoid arthritis(714.0)     newly diagnosed    Wears glasses     Wellness examination 05/2022    Dr. Yolanda Gomez       Past Surgical History:        Procedure Laterality Date    COLONOSCOPY  october 2012    FOOT SURGERY      Left foot surgery HDS 2-4 and bunion        Social History:    Social History     Tobacco Use    Smoking status: Light Tobacco Smoker     Packs/day: 0.25     Years: 20.00     Pack years: 5.00     Types: Cigarettes     Start date: 1/18/2002    Smokeless tobacco: Never Used    Tobacco comment: 1 pack a month   Substance Use Topics    Alcohol use: Yes     Comment: weekends                                Ready to quit: Not Answered  Counseling given: Not Answered  Comment: 1 pack a month      Vital Signs (Current):   Vitals:    06/27/22 0842   BP: (!) 142/85   Pulse: (!) 40   Resp: 20   Temp: 96.8 °F (36 °C)   TempSrc: Temporal   SpO2: 100%                                              BP Readings from Last 3 Encounters:   06/27/22 (!) 142/85   06/21/22 129/71   06/16/22 118/74       NPO Status: Time of last liquid consumption: 0800 (sip of water wtih meds)                        Time of last solid consumption: 2100                        Date of last liquid consumption: 06/27/22 Date of last solid food consumption: 06/26/22    BMI:   Wt Readings from Last 3 Encounters:   06/21/22 183 lb (83 kg)   06/16/22 186 lb (84.4 kg)   06/06/22 183 lb (83 kg)     There is no height or weight on file to calculate BMI.    CBC:   Lab Results   Component Value Date    WBC 6.0 06/16/2022    RBC 4.38 06/16/2022    HGB 12.9 06/16/2022    HCT 38.2 06/16/2022    MCV 87.2 06/16/2022    RDW 13.2 06/16/2022     06/16/2022       CMP:   Lab Results   Component Value Date     06/16/2022    K 3.7 06/16/2022     06/16/2022    CO2 17 06/16/2022    BUN 15 06/16/2022    CREATININE 1.34 06/16/2022    GFRAA >60 06/16/2022    LABGLOM 54 06/16/2022    GLUCOSE 84 06/16/2022    GLUCOSE 89 02/02/2012    PROT 7.9 05/16/2022    CALCIUM 9.7 05/16/2022    BILITOT 0.46 05/16/2022    ALKPHOS 107 05/16/2022    AST 24 05/16/2022    ALT 21 05/16/2022       POC Tests:   Recent Labs     06/27/22  0901   POCGLU 79       Coags:   Lab Results   Component Value Date    APTT 54.7 05/27/2019       HCG (If Applicable): No results found for: PREGTESTUR, PREGSERUM, HCG, HCGQUANT     ABGs: No results found for: PHART, PO2ART, AGS1CPB, QKT9VGV, BEART, B0EYTZQN     Type & Screen (If Applicable):  No results found for: LABABO, LABRH    Drug/Infectious Status (If Applicable):  Lab Results   Component Value Date    HEPCAB NONREACTIVE 11/20/2017       COVID-19 Screening (If Applicable): No results found for: COVID19        Anesthesia Evaluation  Patient summary reviewed no history of anesthetic complications:   Airway: Mallampati: II  TM distance: >3 FB   Neck ROM: full  Mouth opening: > = 3 FB   Dental:          Pulmonary:Negative Pulmonary ROS and normal exam                               Cardiovascular:    (+) hypertension: no interval change,       ECG reviewed  Rhythm: regular  Rate: normal                    Neuro/Psych:   (+) psychiatric history:depression/anxiety             GI/Hepatic/Renal:   (+) GERD: no interval change,           Endo/Other:    (+) : arthritis: OA., .                 Abdominal:             Vascular: negative vascular ROS. Other Findings:           Anesthesia Plan      general     ASA 2       Induction: intravenous. Anesthetic plan and risks discussed with patient. Plan discussed with CRNA.                     Eddie Hodge MD   6/27/2022

## 2022-06-27 NOTE — INTERVAL H&P NOTE
Pt Name: Danny Castillo  MRN: [id number]  YOB: 1960  Date of evaluation: 6/27/2022    I have reviewed the patient's history and physical examination completed in pre-admission testing on 6/16/2022. No changes to history or on examination today, unless noted below. Medical clearance obtained.      AUDI Foster - CNP  6/27/22  9:02 AM

## 2022-06-27 NOTE — BRIEF OP NOTE
PODIATRY BRIEF OP NOTE    PATIENT NAME: Esperanza Carpenter  YOB: 1960  -  59 y.o. male  MRN: [id number]  DATE: 6/27/2022  BILLING #: [id number]    Surgeon(s):  Alexandro Stark DPM     ASSISTANTS: Gary Sanchez DPM PGY-2    PRE-OP DIAGNOSIS:   1. Pain to left foot  2. Recurrent HAV deformity, left  3. Recurrent hammertoe 2nd digit left  4. Recurrent hammertoe 3rd digit left  5. Recurrent hammertoe 4th digit left  6. Contracture of 2nd digit left  7. Contracture of 3rd digit left  8. Metatarsal deformity, 3rd left  9. Failed hardware 2nd metatarsal, left  10. Failed hardware 3rd metatarsal ,left    POST-OP DIAGNOSIS: Same as above. PROCEDURE:   1. 1st Metatarsophalanjeal joint arthrodesis, left foot. 2. Removal of hardware 2nd metatarsal, left foot. 3. Revision hammertoe correction 2nd digit left foot. 4. Remomval of hardware 3rd metatarsal left foot. 5. Revision hammertoe correction 3rd digit left foot. 6. Flexor tenotomy 4th digit left foot. ANESTHESIA: General     HEMOSTASIS: Pneumatic ankle tourniquet at 250 mmHg    ESTIMATED BLOOD LOSS: Less than 15cc. MATERIALS:   Implant Name Type Inv. Item Serial No.  Lot No. LRB No. Used Action   PLATE BNE [id number] 6 H L MT TI V1 COMPR RAMP LO PROF - [id number]  PLATE BNE [id number] 6 H L MT TI V1 COMPR RAMP LO PROF  [employer] [facility]  Left 1 Implanted   SCREW BNE L14MM DIA3. 5MM ANK FT TI FIOR T8 STARDRV RECESS - [id number]  SCREW BNE L14MM DIA3. 5MM ANK FT TI FIOR T8 STARDRV RECESS  [employer] [facility]  Left 1 Implanted   SCREW BNE L16MM DIA3. 5MM ANK FT TI FIOR T8 STARDRV RECESS - [id number]  SCREW BNE L16MM DIA3. 5MM ANK FT TI FIOR T8 STARDRV RECESS  [employer] [facility]  Left 1 Implanted   SCREW BNE L18MM DIA3. 5MM ANK FT TI FIOR T8 STARDRV RECESS - [id number]  SCREW BNE L18MM DIA3. 5MM ANK FT TI FIOR T8 STARDRV RECESS  [employer] [facility]  Left 2 Implanted   SCREW BNE L16MM DIA3. 5MM STD ANK FT TI NONLOCKING T8 - [id number]  SCREW BNE L16MM DIA3. 5MM STD ANK FT TI NONLOCKING T8  [employer] [facility]  Left 1 Implanted   SCREW BNE L22MM DIA3. 5MM F ANK TI NONLOCKING FOR ANCHORAGE - [id number]  SCREW BNE L22MM DIA3. 5MM F ANK TI NONLOCKING FOR ANCHORAGE  [employer] [facility]  Left 1 Implanted   SCREW BNE L34MM OD3MM HDLSS - [id number]  SCREW BNE L34MM OD3MM HDLSS  [employer] [facility]-  Left 1 Implanted   WIRE FIX L150MM DIA1. 6MM FOR FIXOS FOREFOOT MIDFOOT SCR SYS - [id number]  WIRE FIX L150MM DIA1. 6MM FOR FIXOS FOREFOOT MIDFOOT SCR SYS  [employer] [facility]-  Left 1 Implanted and Explanted   K WIRE NTHRD 1.15MM MINI - [id number]  K WIRE NTHRD 1.15MM MINI  [employer] [facility]  Left 1 Implanted and Explanted   WIRE FIX SHOBHA [KWIRE] [[employer]] - [id number]  WIRE FIX SHOBHA [KWIRE] [[employer]]  [employer]  Left 2 Implanted       INJECTABLES:   Pre-op 10cc 1% lidocaine plain  Post-op 20cc 0.5% Marcaine plain. SPECIMEN:   * No specimens in log *    COMPLICATIONS: none    FINDINGS: see op note for detail    The patient was counseled at length about the risks of jesi Covid-19 during their perioperative period and any recovery window from their procedure. The patient was made aware that jesi Covid-19  may worsen their prognosis for recovering from their procedure  and lend to a higher morbidity and/or mortality risk. All material risks, benefits, and reasonable alternatives including postponing the procedure were discussed. The patient does wish to proceed with the procedure at this time.     Darrius Santiago DPM   Podiatric Medicine & Surgery   6/27/2022 at 12:42 PM

## 2022-06-27 NOTE — ANESTHESIA POSTPROCEDURE EVALUATION
Department of Anesthesiology  Postprocedure Note    Patient: Violet Shepard  MRN: [id number]  YOB: 1960  Date of evaluation: 6/27/2022      Procedure Summary     Date: 06/27/22 Room / Location: [facility] 10 / [address]    Anesthesia Start: 1016 Anesthesia Stop: 3538    Procedure: 1ST MPJ FUSION, REVISION HAMMER TOE CORRECTION 2ND, 3RD AND 4TH, REMOVAL HARDWARE 3RD AND 4TH METATARSAL (Left ) Diagnosis:       Hallux abducto valgus, left      (HALLUX ABDUCTO VALGUS, LEFT HAMMER TOES)    Surgeons: Anna Alford DPM Responsible Provider: Juan Carlos Garza MD    Anesthesia Type: general ASA Status: 2          Anesthesia Type: No value filed.     Patricia Phase I: Patricia Score: 9    Patricia Phase II: Patricia Score: 10      Anesthesia Post Evaluation    Patient location during evaluation: PACU  Patient participation: complete - patient participated  Level of consciousness: awake and alert  Pain score: 2  Airway patency: patent  Nausea & Vomiting: no vomiting and no nausea  Complications: no  Cardiovascular status: hemodynamically stable  Respiratory status: acceptable  Hydration status: stable

## 2022-06-28 NOTE — OP NOTE
PODIATRY OP NOTE     PATIENT NAME: Reanna Dia  YOB: 1960  -  59 y.o. male  MRN: [id number]  DATE: 6/27/2022  BILLING #: [id number]     Surgeon(s):  Jamilah Kong DPM      ASSISTANTS: Alfred Velarde DPM PGY-2     PRE-OP DIAGNOSIS:   Pain to left foot  Recurrent HAV deformity, left  Recurrent hammertoe 2nd digit left  Recurrent hammertoe 3rd digit left  Recurrent hammertoe 4th digit left  Contracture of 2nd digit left  Contracture of 3rd digit left  Metatarsal deformity, 3rd left  Failed hardware 2nd metatarsal, left  Failed hardware 3rd metatarsal ,left     POST-OP DIAGNOSIS: Same as above. PROCEDURE:   1st Metatarsophalanjeal joint arthrodesis, left foot. Removal of hardware 2nd metatarsal, left foot. Revision hammertoe correction 2nd digit left foot. Removal of hardware 3rd metatarsal left foot. Partial removal of 3rd metatarsal, left foot. Revision hammertoe correction 3rd digit left foot. Flexor tenotomy 4th digit left foot. ANESTHESIA: General      HEMOSTASIS: Pneumatic ankle tourniquet at 250 mmHg     ESTIMATED BLOOD LOSS: Less than 15cc. MATERIALS:   Implant Name Type Inv. Item Serial No.  Lot No. LRB No. Used Action   PLATE BNE [id number] 6 H L MT TI V1 COMPR RAMP LO PROF - [id number]   PLATE BNE [id number] 6 H L MT TI V1 COMPR RAMP LO PROF   [employer] [facility]   Left 1 Implanted   SCREW BNE L14MM DIA3. 5MM ANK FT TI FIOR T8 STARDRV RECESS - [id number]   SCREW BNE L14MM DIA3. 5MM ANK FT TI FIOR T8 STARDRV RECESS   [employer] [facility]   Left 1 Implanted   SCREW BNE L16MM DIA3. 5MM ANK FT TI FIOR T8 STARDRV RECESS - [id number]   SCREW BNE L16MM DIA3. 5MM ANK FT TI FIOR T8 STARDRV RECESS   [employer] [facility]   Left 1 Implanted   SCREW BNE L18MM DIA3. 5MM ANK FT TI FIOR T8 STARDRV RECESS - [id number]   SCREW BNE L18MM DIA3. 5MM ANK FT TI FIOR T8 STARDRV RECESS   [employer] [facility]   Left 2 Implanted   SCREW BNE L16MM DIA3. 5MM STD ANK FT rongeurs. The subchondral plate was fenestrated with a 2.0 drill bit. A copious amount of saline was used to irrigate the surgical site. Next a mm 3.0 lag screw was inserted from distal medial aspect of the proximal pahlanx to the proximal lateral aspect of the 1st metatarsal shaft with good purchase across the 1st MTPJ. Next a 5 degree dorsiflexed MTPJ arthrodesis plate was placed dorsally over the 1st MTPJ using appropriate AO technique. Locking screws were inserted sequentially to secure the plate to the bone. Fluoroscopy was then used to check final alignment and the 1st ray was noted to be in good alignment with improvement to the first ray alignment. Revision hammertoe 2nd digit & removal of hardware 2nd metatarsal.  Attention was then directed to the 2nd digit where a linear incision was made over the distal 2nd metatarsal to the DIPJ. This was deepened until the extensor tendon and PIPJ were identified. The extensor tendon was transected just proximal to the PIPJ and reflected to the level of the MTPJ. At this point, a small screw was found in the 2nd metatarsal. This screw was loose and easily removed. A previous fusion of the PIPJ was appreciated, a sagital saw was used to recreate the PIPJ joint and a small portion of the head of the proximal phalanx was resected. The DIPJ was also found to have an angular deformity. A portion of the head of the middle phalanx was removed using a sagittal saw. The 2nd digit was then able to be adequately reduced. The MTPJ capsule was then released and a McGlammry elevator was used to assist in release of the metatarsal phalangeal joint. A 0.62 K-wire was then inserted from distal to proximal, the toe was then plantarflexed and the K-wire was driven into the 2nd metatarsal.     Revision hammertoe 3rd digit & removal of hardware 3rd metatarsal.  Attention was then directed to the 3rd digit where a linear incision was made over the distal 3rd metatarsal to the DIPJ. This was deepened until the extensor tendon and PIPJ were identified. The extensor tendon was transected just proximal to the PIPJ and reflected to the level of the MTPJ. At this point, a small screw was found in the 3rd metatarsal. This screw was loose and easily removed. A previous fusion of the PIPJ was appreciated, a sagital saw was used to recreate the PIPJ joint and a small portion of the head of the proximal phalanx was resected. The DIPJ was also found to have an angular deformity. A portion of the head of the middle phalanx was removed using a sagittal saw. The 3rd digit was then able to be adequately reduced. The MTPJ capsule was then released and a McGlamy elevator was used to assist in release of the metatarsal phalangeal joint. .      The 3rd metatarsal head was then examined and found to be enlarged and plantarflexed. This was confirmed upon plantar palpation of the 3rd metatarsal where a hyperkeratosis was present. The head of the metatarsal was reshaped using a combination of rongeur and power rasp. Significant decrease in pressure was found to the 3rd metatarsal and adequate resection of the 3rd metatarsal head was achieved. A 0.62 K-wire was then inserted from distal to proximal, the toe was then plantarflexed and the K-wire was driven into the 3rd metatarsal.     Flexor tenotomy 4th digit  Attention was then directed to the 4th digit digit. The deformity of the 4th digit was not as severe and was reducible. To repair the 4th digit hammertoe contracture, an incision was made to the plantar 4th digit ,in the 4th digit sulcus to perform a flexor tenotomy. The toe was forcefully dorsiflexed and straightened. Reduction of the hammertoe was observed. The wires were bent and a pin cap was applied. Final radiographs were obtained which shoed good alignment of the toes, good hardware placement.     The surgical sites were then irrigated with copious amounts of saline and the extensor tendons

## 2022-07-05 NOTE — PROGRESS NOTES
Patient instructed to remove shoes and socks and instructed to sit in exam chair. Current PCP is Michelle Guillaume MD and date of last visit was 06/21/2022. Do you have a follow up visit scheduled?   No  If yes, the date is

## 2022-07-08 ENCOUNTER — OFFICE VISIT (OUTPATIENT)
Dept: PODIATRY | Age: 62
End: 2022-07-08

## 2022-07-08 VITALS
WEIGHT: 183 LBS | BODY MASS INDEX: 23.49 KG/M2 | HEIGHT: 74 IN | HEART RATE: 51 BPM | DIASTOLIC BLOOD PRESSURE: 70 MMHG | SYSTOLIC BLOOD PRESSURE: 115 MMHG

## 2022-07-08 DIAGNOSIS — Z98.890 S/P BUNIONECTOMY: ICD-10-CM

## 2022-07-08 DIAGNOSIS — Z98.890 POST-OPERATIVE STATE: Primary | ICD-10-CM

## 2022-07-08 DIAGNOSIS — Z98.890 S/P HAMMER TOE CORRECTION: ICD-10-CM

## 2022-07-08 DIAGNOSIS — Z87.39 S/P HAMMER TOE CORRECTION: ICD-10-CM

## 2022-07-08 PROCEDURE — 99024 POSTOP FOLLOW-UP VISIT: CPT

## 2022-07-08 NOTE — PROGRESS NOTES
COLONOSCOPY  october 2012    FOOT SURGERY      Left foot surgery HDS 2-4 and bunion     FOOT SURGERY Left 06/27/2022    1ST MPJ FUSION, REVISION HAMMER TOE CORRECTION 2ND, 3RD AND 4TH, REMOVAL HARDWARE 3RD AND 4TH METATARSAL     HAMMER TOE SURGERY Left 6/27/2022    1ST MPJ FUSION, REVISION HAMMER TOE CORRECTION 2ND, 3RD AND 4TH, REMOVAL HARDWARE 3RD AND 4TH METATARSAL performed by Celi Bush DPM at [facility] History:  Social History     Tobacco Use    Smoking status: Light Tobacco Smoker     Packs/day: 0.25     Years: 20.00     Pack years: 5.00     Types: Cigarettes     Start date: 1/18/2002    Smokeless tobacco: Never Used    Tobacco comment: 1 pack a month   Vaping Use    Vaping Use: Never used   Substance Use Topics    Alcohol use: Yes     Comment: weekends    Drug use: No       Medications:  Prior to Admission medications    Medication Sig Start Date End Date Taking? Authorizing Provider   tamsulosin (FLOMAX) 0.4 MG capsule Take 0.4 mg by mouth daily   Yes Historical Provider, MD   omeprazole (PRILOSEC) 20 MG delayed release capsule TAKE 1 CAPSULE BY MOUTH DAILY 5/23/22  Yes Thierry Lee MD   atorvastatin (LIPITOR) 40 MG tablet Take 1 tablet by mouth nightly 4/19/22  Yes Buddy Guidry MD   lisinopril (PRINIVIL;ZESTRIL) 10 MG tablet TAKE 1 TABLET BY MOUTH DAILY 4/19/22  Yes Buddy Guidry MD   amLODIPine (NORVASC) 10 MG tablet Take 1 tablet by mouth daily 1/18/22  Yes Jesika Hernandez MD   metoprolol tartrate (LOPRESSOR) 50 MG tablet Take 1 tablet by mouth 2 times daily 1/18/22  Yes Jesika Hernandez MD   apixaban (ELIQUIS) 5 MG TABS tablet TAKE 1 TABLET BY MOUTH TWICE A DAY  Patient not taking: Reported on 7/8/2022 1/18/22   Jesika Hernandez MD       Objective     Vitals:    07/08/22 1327   BP: 115/70   Pulse: 51       Lab Results   Component Value Date    LABA1C 5.7 05/16/2022       Physical Exam:  General:  Alert and oriented x3. In no acute distress.      Patient presents weightbearing to the heel with a walker and a posterior splint to the left leg. Dressing is dry, clean, and intact with mild strike through noted. Problem focus examination to the left lower extremity:  Incision site is well coapted without evidence of dehiscence. Mild erythema and edema surrounding surgical site. No drainage. No lymphadenopathy. No lymphangitis. No surrounding cellulitis. No signs of infection. Patient has no pain to palpation of calf. The calf is soft, supple and nontender without evidence of DVT. Negative Tad's test.  Satisfactory alignment is noted. Pedal pulses are palpable. Capillary refill time is less than three seconds to all digits. Sensations are intact to light touch. Imaging: No imaging was taken prior to today's visit     Assessment   Gerhardt Mania is a 59 y.o. male with     Diagnosis Orders   1. Post-operative state     2. S/P hammer toe correction     3. S/P bunionectomy          Plan   · Patient examined and evaluated  · The patient was educated on clinical examination findings, postoperative prognosis and protocol. All questions were answered to patient's apparent satisfaction. · Post op dressing removed and new dressing applied. · Patient to continue weightbearing to heel with a walker and posterior splint to the operative extremity. · Patient to RTC in 1 week for post op visit and possible removal of sutures   · Please, call the office with any questions or concerns     No orders of the defined types were placed in this encounter. No orders of the defined types were placed in this encounter.       Santana Hightower DPM   Podiatric Medicine & Surgery   7/8/2022 at 6:05 PM

## 2022-07-12 NOTE — PROGRESS NOTES
Patient instructed to remove shoes and socks and instructed to sit in exam chair. Current PCP is Adriana Ybarra MD and date of last visit was 06/21/2022. Do you have a follow up visit scheduled?   No  If yes, the date is

## 2022-07-15 ENCOUNTER — OFFICE VISIT (OUTPATIENT)
Dept: PODIATRY | Age: 62
End: 2022-07-15
Payer: MEDICAID

## 2022-07-15 VITALS
HEART RATE: 64 BPM | HEIGHT: 74 IN | WEIGHT: 183 LBS | SYSTOLIC BLOOD PRESSURE: 130 MMHG | DIASTOLIC BLOOD PRESSURE: 82 MMHG | BODY MASS INDEX: 23.49 KG/M2

## 2022-07-15 DIAGNOSIS — Z98.890 S/P HAMMER TOE CORRECTION: ICD-10-CM

## 2022-07-15 DIAGNOSIS — Z98.890 S/P BUNIONECTOMY: ICD-10-CM

## 2022-07-15 DIAGNOSIS — B35.1 ONYCHOMYCOSIS OF TOENAIL: Primary | ICD-10-CM

## 2022-07-15 DIAGNOSIS — M79.674 PAIN OF TOES OF BOTH FEET: ICD-10-CM

## 2022-07-15 DIAGNOSIS — Z87.39 S/P HAMMER TOE CORRECTION: ICD-10-CM

## 2022-07-15 DIAGNOSIS — M79.675 PAIN OF TOES OF BOTH FEET: ICD-10-CM

## 2022-07-15 DIAGNOSIS — E11.9 TYPE 2 DIABETES MELLITUS WITHOUT COMPLICATION, WITHOUT LONG-TERM CURRENT USE OF INSULIN (HCC): ICD-10-CM

## 2022-07-15 DIAGNOSIS — Z98.890 POST-OPERATIVE STATE: ICD-10-CM

## 2022-07-15 PROCEDURE — 99999 PR OFFICE/OUTPT VISIT,PROCEDURE ONLY: CPT | Performed by: PODIATRIST

## 2022-07-15 PROCEDURE — 11721 DEBRIDE NAIL 6 OR MORE: CPT | Performed by: PODIATRIST

## 2022-07-15 NOTE — PROGRESS NOTES
[facility] [address], [place]  Tel: [phone]   Fax: [phone]    Subjective     Procedure:   1st Metatarsophalanjeal joint arthrodesis, left foot. Removal of hardware 2nd metatarsal, left foot. Revision hammertoe correction 2nd digit left foot. Removal of hardware 3rd metatarsal left foot. Revision hammertoe correction 3rd digit left foot. Flexor tenotomy 4th digit left foot. DOS: 6/27/2022  POV#: 2    HPI:  This 59 y.o. male presents for second post op visit. Patient states they are doing well. Pain is well controlled at this time. Has been icing and elevating the extremity as instructed preoperatively and has been weightbearing to the heel with a walker to the left lower extremity. Denies any current nausea, vomiting, fever, chills, shortness of breath, chest pain or calf pain. Denies any other pedal complaints    Primary care physician is Aria Del Valle MD.    ROS:    Constitutional: Denies nausea, vomiting, fever, chills. Neurologic: Denies numbness, tingling, and burning in the feet. Vascular: Denies symptoms of lower extremity claudication. Skin: Denies open wounds. Otherwise negative except as noted in the HPI. PMH:  Past Medical History:   Diagnosis Date    Alcoholic ([facility] 63/92/2894    Cocaine dependence ([facility] 06/23/2021    GERD (gastroesophageal reflux disease)     Hallux abducto valgus     Hammer toe of left foot     History of atrial fibrillation     has followed with Dr. [facility] last visit 6/2021. On Eliquis    History of BPH     History of echocardiogram 2019    EF 62%    Hypertension     Dr. Sienna Mutsafa    Osteoarthritis     Prediabetes     border line.  not on meds    Rheumatoid arthritis(714.0)     newly diagnosed    Wears glasses     Wellness examination 05/2022    Dr. Benjie Aragon       Surgical History:   Past Surgical History:   Procedure Laterality Date    COLONOSCOPY  october 2012 FOOT SURGERY      Left foot surgery HDS 2-4 and bunion     FOOT SURGERY Left 06/27/2022    1ST MPJ FUSION, REVISION HAMMER TOE CORRECTION 2ND, 3RD AND 4TH, REMOVAL HARDWARE 3RD AND 4TH METATARSAL     HAMMER TOE SURGERY Left 6/27/2022    1ST MPJ FUSION, REVISION HAMMER TOE CORRECTION 2ND, 3RD AND 4TH, REMOVAL HARDWARE 3RD AND 4TH METATARSAL performed by Sammie Sam DPM at [facility] History:  Social History     Tobacco Use    Smoking status: Former     Packs/day: 0.25     Years: 20.00     Pack years: 5.00     Types: Cigarettes     Start date: 1/18/2002     Passive exposure: Past    Smokeless tobacco: Never    Tobacco comments:     1 pack a month   Vaping Use    Vaping Use: Never used   Substance Use Topics    Alcohol use: Yes     Comment: weekends    Drug use: No       Medications:  Prior to Admission medications    Medication Sig Start Date End Date Taking? Authorizing Provider   tamsulosin (FLOMAX) 0.4 MG capsule Take 0.4 mg by mouth daily    Historical Provider, MD   omeprazole (PRILOSEC) 20 MG delayed release capsule TAKE 1 CAPSULE BY MOUTH DAILY 5/23/22   Shauna Lebron MD   atorvastatin (LIPITOR) 40 MG tablet Take 1 tablet by mouth nightly 4/19/22   Thalia Ryan MD   lisinopril (PRINIVIL;ZESTRIL) 10 MG tablet TAKE 1 TABLET BY MOUTH DAILY 4/19/22   Thalia Ryan MD   amLODIPine (NORVASC) 10 MG tablet Take 1 tablet by mouth daily 1/18/22   Krista Hayes MD   metoprolol tartrate (LOPRESSOR) 50 MG tablet Take 1 tablet by mouth 2 times daily 1/18/22   Krista Hayes MD   apixaban (ELIQUIS) 5 MG TABS tablet TAKE 1 TABLET BY MOUTH TWICE A DAY  Patient not taking: Reported on 7/8/2022 1/18/22   Krista Hayes MD       Objective     Vitals:    07/15/22 1328   BP: 130/82   Pulse: 64       Lab Results   Component Value Date    LABA1C 5.7 05/16/2022       Physical Exam:  General:  Alert and oriented x3. In no acute distress.      Patient presents weightbearing to the heel with a walker and a posterior splint to the left leg. Dressing is dry, clean, and intact with mild strike through noted. Problem focus examination to the left lower extremity:  Incision site is well coapted without evidence of dehiscence. Minimal erythema and edema surrounding surgical site. Some xeroderma and hyperkeratotic skin around incision sites. No drainage. No lymphadenopathy. No lymphangitis. No surrounding cellulitis. No signs of infection. Patient has no pain to palpation of calf. The calf is soft, supple and nontender without evidence of DVT. Negative Tad's test.  Satisfactory alignment is noted. Pedal pulses are palpable. Capillary refill time is less than three seconds to all digits. Sensations are intact to light touch. Imaging: No imaging was taken prior to today's visit     Assessment   Lauren Steven is a 59 y.o. male with     Diagnosis Orders   1. Post-operative state        2. S/P hammer toe correction        3. S/P bunionectomy               Plan   Patient examined and evaluated  The patient was educated on clinical examination findings, postoperative prognosis and protocol. All questions were answered to patient's apparent satisfaction.    Posterior splint removed and sutures removed today  Patient sent home with stockinet on left foot and instructed to keep foot dry  Patient to continue weightbearing to heel with a walker   Patient to RTC in 2 weeks  Please, call the office with any questions or concerns       Edin Baptiste MD   Podiatric Medicine & Surgery   7/15/2022 at 2:34 PM

## 2022-07-19 ASSESSMENT — ENCOUNTER SYMPTOMS
BACK PAIN: 0
NAUSEA: 0
COLOR CHANGE: 0
SHORTNESS OF BREATH: 0
DIARRHEA: 0

## 2022-07-19 NOTE — PROGRESS NOTES
SUBJECTIVE: Kayy Tovar is a 59 y.o. male who returns to the office with chief complaint of painful fungal toenails. Patient relates toe nails are thickened/difficult to trim as well as painful with ambulation and with shoe gear. Chief Complaint   Patient presents with    Follow-up     Post op, Surgery to left foot     Review of Systems   Constitutional:  Negative for activity change, appetite change, chills, diaphoresis, fatigue and fever. Respiratory:  Negative for shortness of breath. Cardiovascular:  Negative for leg swelling. Gastrointestinal:  Negative for diarrhea and nausea. Endocrine: Negative for cold intolerance, heat intolerance and polyuria. Musculoskeletal:  Positive for arthralgias. Negative for back pain, gait problem, joint swelling and myalgias. Skin:  Negative for color change, pallor, rash and wound. Allergic/Immunologic: Negative for environmental allergies and food allergies. Neurological:  Negative for dizziness, weakness, light-headedness and numbness. Hematological:  Does not bruise/bleed easily. Psychiatric/Behavioral:  Negative for behavioral problems, confusion and self-injury. The patient is not nervous/anxious. OBJECTIVE: Clinical evaluation of patient reveals nails 1,2,3,4,5 of the right foot and nails 1,2,3,4,5 of the left foot to present with thickness, elongation, discoloration, brittleness, and subungual debris. There was pain with palpation and debridement of the toenails of the bilateral feet. No open lesions noted to either foot today. The right DP pulse is palpable. The left DP pulse is palpable. The right PT pulse is palpable. The left PT pulse is palpable. Protective sensation is present to the right plantar foot as noted with a 5.07 [place] monofilament. Protective sensation is present to the left plantar foot as noted with a 5.07 [place] monofilament.    Glucose: Patient states that they do not know their current blood sugar level. Class A Findings (1 needed)   [] Non-traumatic amputation of foot or integral skeleton portion thereof. [] Q7.      Class B Findings (2 needed)   1. [] Absent posterior tibial pulse   2. [] Absent dorsalis pedis pulse   3. [] Advanced trophic changes; three of the following are required:   ·         [] hair growth (decrease or absence)   ·         [] nail changes (thickening)   ·         [] pigmentary changes (discoloration)   ·         [] skin texture (thin, shiny)   ·         [] skin color (rubor or redness)   [] Q8.      Class C Findings (1 Class B, 2 Class C needed)   1. [] Claudication   2. [] Temperature changes   3. [] Edema   4. [] Paresthesia   5. [] Burning   [] Q9.     NO CLASS FINDINGS ARE NOTED    ASSESSMENT:    Diagnosis Orders   1. Onychomycosis of toenail  IN DEBRIDEMENT OF NAILS, 6 OR MORE    HM DIABETES FOOT EXAM      2. Pain of toes of both feet  IN DEBRIDEMENT OF NAILS, 6 OR MORE    HM DIABETES FOOT EXAM      3. Post-operative state        4. S/P hammer toe correction        5. S/P bunionectomy        6. Type 2 diabetes mellitus without complication, without long-term current use of insulin (HCC)  IN DEBRIDEMENT OF NAILS, 6 OR MORE    HM DIABETES FOOT EXAM        PLAN: Toenails 1,2,3,4,5 of the right foot and 1,2,3,4,5 of the left foot were debrided in length and thickness using a nail nipper and a . Return in about 9 weeks (around 9/16/2022) for Painful fungal nails.    7/15/2022      Francesco Dailey DPM

## 2022-07-27 ENCOUNTER — TELEPHONE (OUTPATIENT)
Dept: PODIATRY | Age: 62
End: 2022-07-27

## 2022-07-27 NOTE — PROGRESS NOTES
Patient instructed to remove shoes and socks and instructed to sit in exam chair. Current PCP is Adriana Ybarra MD and date of last visit was 05/31/2022. Do you have a follow up visit scheduled?   No  If yes, the date is

## 2022-08-01 ENCOUNTER — OFFICE VISIT (OUTPATIENT)
Dept: PODIATRY | Age: 62
End: 2022-08-01

## 2022-08-01 VITALS
HEIGHT: 74 IN | SYSTOLIC BLOOD PRESSURE: 93 MMHG | WEIGHT: 183 LBS | RESPIRATION RATE: 58 BRPM | BODY MASS INDEX: 23.49 KG/M2 | DIASTOLIC BLOOD PRESSURE: 50 MMHG

## 2022-08-01 DIAGNOSIS — Z98.890 S/P HAMMER TOE CORRECTION: ICD-10-CM

## 2022-08-01 DIAGNOSIS — Z98.890 S/P BUNIONECTOMY: ICD-10-CM

## 2022-08-01 DIAGNOSIS — Z87.39 S/P HAMMER TOE CORRECTION: ICD-10-CM

## 2022-08-01 DIAGNOSIS — M79.672 LEFT FOOT PAIN: ICD-10-CM

## 2022-08-01 DIAGNOSIS — Z98.890 POST-OPERATIVE STATE: Primary | ICD-10-CM

## 2022-08-01 PROCEDURE — 99024 POSTOP FOLLOW-UP VISIT: CPT | Performed by: STUDENT IN AN ORGANIZED HEALTH CARE EDUCATION/TRAINING PROGRAM

## 2022-08-01 NOTE — PROGRESS NOTES
[facility]  [address] [facility] [address] [facility], [address] [place]  Tel: [phone]   Fax: [phone]    Subjective     Procedure:   1st Metatarsophalanjeal joint arthrodesis, left foot. Removal of hardware 2nd metatarsal, left foot. Revision hammertoe correction 2nd digit left foot. Removal of hardware 3rd metatarsal left foot. Revision hammertoe correction 3rd digit left foot. Flexor tenotomy 4th digit left foot. DOS: 6/27/2022    Interval History, 8/1/2022: This 59 y.o. male presents for a post op visit, reports increased pain to the left foot when walking. He has been walking in the surgical shoe and states he has a walker at home to help with non-weight bearing but presents to the office today without it. Has been icing and elevating the extremity as instructed. Denies any shortness of breath, chest pain or calf pain. Denies any other pedal complaints. Primary care physician is Dm Adams MD.    ROS:    Constitutional: Denies nausea, vomiting, fever, chills. Neurologic: Denies numbness, tingling, and burning in the feet. Vascular: Denies symptoms of lower extremity claudication. Skin: Denies open wounds. Otherwise negative except as noted in the HPI. Objective     Vitals:    08/01/22 1250   BP: (!) 93/50   Resp: (!) 58       Lab Results   Component Value Date    LABA1C 5.7 05/16/2022       Physical Exam:  General:  Alert and oriented x3. In no acute distress. Problem focus examination to the left lower extremity:    Incision site is well coapted without evidence of dehiscence. Pins in place to left foot 2nd & 3rd digits, suture to plantar 4th digit. Mild erythema and edema surrounding surgical site and pin sites. No drainage. No lymphangitis. No surrounding cellulitis. No signs of gross infection. Patient has no pain to palpation of calf. The calf is soft, supple and nontender. Negative Tad's test.  Satisfactory alignment is noted.      Pedal pulses are palpable. Capillary refill time is less than three seconds to all digits. Sensations are intact to light touch. Assessment   Lucy Wu is a 59 y.o. male with     Diagnosis Orders   1. Post-operative state  XR FOOT LEFT (MIN 3 VIEWS)      2. Left foot pain               Plan   Patient examined and evaluated  The patient was educated on clinical examination findings, postoperative prognosis and protocol. All questions were answered to patient's apparent satisfaction. Post op dressing removed, pins removed, suture removed. Pin sites and suture site cleansed with betadine and adhesive bandage applied. Instructed patient that he can start showering on Thursday. Patient to continue protected weightbearing to left foot with a walker and post op shoe. Tubigrip and ace dispensed for swelling. Patient to RTC in 2 weeks, review new xrays at that time. Please, call the office with any questions or concerns     No orders of the defined types were placed in this encounter.     Orders Placed This Encounter   Procedures    XR FOOT LEFT (MIN 3 VIEWS)     Standing Status:   Future     Standing Expiration Date:   8/1/2023     Scheduling Instructions:      Weight bearing     Order Specific Question:   Reason for exam:     Answer:   post op         Lilly Lombardo DPM   Podiatric Medicine & Surgery   8/1/2022 at 3:04 PM

## 2022-08-05 DIAGNOSIS — I48.0 PAROXYSMAL ATRIAL FIBRILLATION (HCC): ICD-10-CM

## 2022-08-05 NOTE — TELEPHONE ENCOUNTER
Request for Eliquis. Patient advised to f/u in October for next appt     Next Visit Date:  Future Appointments   Date Time Provider Sheldon Eubanks   8/15/2022  1:15 PM Pranav Canseco DPM [facility]   Topic Date Due    Diabetic retinal exam  Never done    Pneumococcal 0-64 years Vaccine (2 - PCV) 12/08/2016    Prostate Specific Antigen (PSA) Screening or Monitoring  02/11/2021    Diabetic microalbuminuria test  03/15/2022    Flu vaccine (1) 09/01/2022    Depression Screen  01/18/2023    Colorectal Cancer Screen  01/29/2023    A1C test (Diabetic or Prediabetic)  05/16/2023    Lipids  05/16/2023    Diabetic foot exam  07/19/2023    DTaP/Tdap/Td vaccine (2 - Td or Tdap) 09/13/2026    Shingles vaccine  Completed    COVID-19 Vaccine  Completed    Hepatitis C screen  Completed    HIV screen  Completed    Hepatitis A vaccine  Aged Out    Hib vaccine  Aged Out    Meningococcal (ACWY) vaccine  Aged Out       Hemoglobin A1C (%)   Date Value   05/16/2022 5.7   09/07/2021 5.8   11/17/2020 5.8             ( goal A1C is < 7)   Microalb/Crt.  Ratio (mcg/mg creat)   Date Value   03/15/2021 CANNOT BE CALCULATED     LDL Cholesterol (mg/dL)   Date Value   05/16/2022 45       (goal LDL is <100)   AST (U/L)   Date Value   05/16/2022 24     ALT (U/L)   Date Value   05/16/2022 21     BUN (mg/dL)   Date Value   06/16/2022 15     BP Readings from Last 3 Encounters:   08/01/22 (!) 93/50   07/15/22 130/82   07/08/22 115/70          (goal 120/80)    All Future Testing planned in [facility]  Lab Frequency Next Occurrence   PSA, Diagnostic Once 09/18/2021   Lipid, Fasting Once 92/79/6078   Basic Metabolic Panel Once 43/30/7133   Microalbumin, Ur Once 05/31/2022   XR FOOT LEFT (MIN 3 VIEWS) Once 06/06/2022   XR FOOT RIGHT (MIN 3 VIEWS) Once 06/06/2022   XR FOOT LEFT (MIN 3 VIEWS) Once 08/01/2022         Patient Active Problem List:     S/P hammer toe correction     Prediabetes     Gastroesophageal reflux disease     Essential hypertension     Smoker     A-fib ([facility]     Cataract     Primary osteoarthritis of both knees     Chronic midline low back pain without sciatica     Benign prostatic hyperplasia with incomplete bladder emptying     Hyperlipidemia     Hallux abducto valgus, left     Pain of left foot     Hammer toe of left foot     Pain in toe of left foot     Pain from implanted hardware     Contracture of toe, left

## 2022-08-09 ENCOUNTER — HOSPITAL ENCOUNTER (OUTPATIENT)
Age: 62
Discharge: HOME | End: 2022-08-11
Payer: MEDICAID

## 2022-08-09 ENCOUNTER — HOSPITAL ENCOUNTER (OUTPATIENT)
Dept: GENERAL RADIOLOGY | Age: 62
Discharge: HOME | End: 2022-08-11
Payer: MEDICAID

## 2022-08-09 DIAGNOSIS — Z98.890 POST-OPERATIVE STATE: ICD-10-CM

## 2022-08-09 PROCEDURE — 73630 X-RAY EXAM OF FOOT: CPT

## 2022-08-09 NOTE — PROGRESS NOTES
Patient instructed to remove shoes and socks and instructed to sit in exam chair. Current PCP is Blayne Bahena MD and date of last visit was 05/31/2022. Do you have a follow up visit scheduled?   No  If yes, the date is

## 2022-08-15 ENCOUNTER — OFFICE VISIT (OUTPATIENT)
Dept: PODIATRY | Age: 62
End: 2022-08-15

## 2022-08-15 VITALS
WEIGHT: 183 LBS | DIASTOLIC BLOOD PRESSURE: 69 MMHG | HEART RATE: 51 BPM | BODY MASS INDEX: 23.49 KG/M2 | HEIGHT: 74 IN | SYSTOLIC BLOOD PRESSURE: 132 MMHG

## 2022-08-15 DIAGNOSIS — Z87.39 S/P HAMMER TOE CORRECTION: ICD-10-CM

## 2022-08-15 DIAGNOSIS — Z98.890 POST-OPERATIVE STATE: Primary | ICD-10-CM

## 2022-08-15 DIAGNOSIS — Z98.890 S/P BUNIONECTOMY: ICD-10-CM

## 2022-08-15 DIAGNOSIS — Z98.890 S/P HAMMER TOE CORRECTION: ICD-10-CM

## 2022-08-15 DIAGNOSIS — M79.672 LEFT FOOT PAIN: ICD-10-CM

## 2022-08-15 PROCEDURE — 99024 POSTOP FOLLOW-UP VISIT: CPT

## 2022-08-15 NOTE — PROGRESS NOTES
[address]  [address] [place] [address], [place]  Tel: [phone]   Fax: [phone]    Subjective     Procedure:   1st Metatarsophalanjeal joint arthrodesis, left foot. Removal of hardware 2nd metatarsal, left foot. Revision hammertoe correction 2nd digit left foot. Removal of hardware 3rd metatarsal left foot. Revision hammertoe correction 3rd digit left foot. Flexor tenotomy 4th digit left foot. DOS: 6/27/2022    Interval History, 8/15/2022: This 59 y.o. male presents for a post op visit, reports decreased pain to the left foot when walking. He states that he has been walking in his surgical shoe at home but shows up to his visit in work boots. Has been icing and elevating the extremity as instructed. Denies any shortness of breath, chest pain or calf pain. Denies any other pedal complaints. Primary care physician is Kendra Teixeira MD.    ROS:    Constitutional: Denies nausea, vomiting, fever, chills. Neurologic: Denies numbness, tingling, and burning in the feet. Vascular: Denies symptoms of lower extremity claudication. Skin: Denies open wounds. Otherwise negative except as noted in the HPI. Objective     Vitals:    08/15/22 1239   BP: 132/69   Pulse: 51       Lab Results   Component Value Date    LABA1C 5.7 05/16/2022       Physical Exam:  General:  Alert and oriented x3. In no acute distress. Problem focus examination to the left lower extremity:    Incision site is well coapted without evidence of dehiscence. Three sutures remain to the dorsal incisions of the 1st, 2nd and 3rd metatarsals. Mild edema surrounding surgical site. No erythema present. No drainage. No lymphangitis. No surrounding cellulitis. No signs of gross infection. Patient has no pain to palpation of calf. The calf is soft, supple and nontender. Negative Tad's test.  Satisfactory alignment is noted. Pedal pulses are palpable.   Capillary refill time is less than three seconds to all digits. Sensations are intact to light touch. Assessment   Luis A Barriga is a 59 y.o. male with     Diagnosis Orders   1. Post-operative state        2. Left foot pain        3. S/P hammer toe correction        4. S/P bunionectomy               Plan   Patient examined and evaluated  The patient was educated on clinical examination findings, postoperative prognosis and protocol. All questions were answered to patient's apparent satisfaction. X-rays were reviewed with the patient at today's visit. Adequate healing across the fusion site appreciated. Hardware still intact. Remaining sutures removed without incident. Patient to continue protected weightbearing to left foot with a walker and post op shoe. Patient to RTC in 6 weeks  Please, call the office with any questions or concerns     No orders of the defined types were placed in this encounter. No orders of the defined types were placed in this encounter.         Sarah Díaz DPM   Podiatric Medicine & Surgery   8/15/2022 at 1:28 PM

## 2022-09-02 DIAGNOSIS — I10 ESSENTIAL HYPERTENSION: ICD-10-CM

## 2022-09-02 RX ORDER — LISINOPRIL 10 MG/1
TABLET ORAL
Qty: 30 TABLET | Refills: 1 | OUTPATIENT
Start: 2022-09-02

## 2022-09-13 NOTE — TELEPHONE ENCOUNTER
E-scribe request for Prilosec . Please review and e-scribe if applicable. Next Visit Date:  Future Appointments   Date Time Provider Sheldon Jewelli   9/26/2022 12:45 PM Lalitha Patel DPM [email or web address]   10/18/2022 10:30 AM Rachel Tate  [address] Maintenance   Topic Date Due    Diabetic retinal exam  Never done    Pneumococcal 0-64 years Vaccine (2 - PCV) 12/08/2016    Diabetic microalbuminuria test  03/15/2022    Flu vaccine (1) 09/01/2022    Depression Screen  01/18/2023    Colorectal Cancer Screen  01/29/2023    A1C test (Diabetic or Prediabetic)  05/16/2023    Lipids  05/16/2023    Diabetic foot exam  07/19/2023    DTaP/Tdap/Td vaccine (2 - Td or Tdap) 09/13/2026    Shingles vaccine  Completed    COVID-19 Vaccine  Completed    Hepatitis C screen  Completed    HIV screen  Completed    Hepatitis A vaccine  Aged Out    Hib vaccine  Aged Out    Meningococcal (ACWY) vaccine  Aged Out               (applicable per patient's age: Cancer Screenings, Depression Screening, Fall Risk Screening, Immunizations)    Hemoglobin A1C (%)   Date Value   05/16/2022 5.7   09/07/2021 5.8   11/17/2020 5.8     Microalb/Crt.  Ratio (mcg/mg creat)   Date Value   03/15/2021 CANNOT BE CALCULATED     LDL Cholesterol (mg/dL)   Date Value   05/16/2022 45     AST (U/L)   Date Value   05/16/2022 24     ALT (U/L)   Date Value   05/16/2022 21     BUN (mg/dL)   Date Value   06/16/2022 15      (goal A1C is < 7)   (goal LDL is <100) need 30-50% reduction from baseline     BP Readings from Last 3 Encounters:   08/15/22 132/69   08/01/22 (!) 93/50   07/15/22 130/82    (goal /80)      All Future Testing planned in [facility]:  Lab Frequency Next Occurrence   PSA, Diagnostic Once 09/18/2021   Lipid, Fasting Once 70/14/9315   Basic Metabolic Panel Once 79/85/6977   Microalbumin, Ur Once 05/31/2022   XR FOOT LEFT (MIN 3 VIEWS) Once 06/06/2022   XR FOOT RIGHT (MIN 3 VIEWS) Once 06/06/2022 Patient Active Problem List:     S/P hammer toe correction     Prediabetes     Gastroesophageal reflux disease     Essential hypertension     Smoker     A-fib (HCC)     Cataract     Primary osteoarthritis of both knees     Chronic midline low back pain without sciatica     Benign prostatic hyperplasia with incomplete bladder emptying     Hyperlipidemia     Hallux abducto valgus, left     Pain of left foot     Hammer toe of left foot     Pain in toe of left foot     Pain from implanted hardware     Contracture of toe, left

## 2022-09-14 RX ORDER — OMEPRAZOLE 20 MG/1
CAPSULE, DELAYED RELEASE ORAL
Qty: 30 CAPSULE | Refills: 3 | Status: SHIPPED | OUTPATIENT
Start: 2022-09-14 | End: 2022-10-18 | Stop reason: SDUPTHER

## 2022-09-26 ENCOUNTER — OFFICE VISIT (OUTPATIENT)
Dept: PODIATRY | Age: 62
End: 2022-09-26

## 2022-09-26 VITALS
HEART RATE: 57 BPM | SYSTOLIC BLOOD PRESSURE: 126 MMHG | WEIGHT: 183 LBS | HEIGHT: 74 IN | BODY MASS INDEX: 23.49 KG/M2 | DIASTOLIC BLOOD PRESSURE: 64 MMHG

## 2022-09-26 DIAGNOSIS — Z98.890 POST-OPERATIVE STATE: Primary | ICD-10-CM

## 2022-09-26 PROCEDURE — 99024 POSTOP FOLLOW-UP VISIT: CPT

## 2022-09-26 NOTE — PROGRESS NOTES
[address]  [address] [place] [address], [place]  Tel: [phone]   Fax: [phone]    Subjective     Interval History:   Patient is ambulating with moderate pain when wakes up and when he goes to bed. 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 Patient states he wants something for the pain    Procedure:   1st Metatarsophalanjeal joint arthrodesis, left foot. Removal of hardware 2nd metatarsal, left foot. Revision hammertoe correction 2nd digit left foot. Removal of hardware 3rd metatarsal left foot. Revision hammertoe correction 3rd digit left foot. Flexor tenotomy 4th digit left foot. DOS: 6/27/2022    HPI  This 57 y.o. male presents for a post op visit, reports decreased pain to the left foot when walking. He states that he has been walking in his surgical shoe at home but shows up to his visit in work boots. Has been icing and elevating the extremity as instructed. Denies any shortness of breath, chest pain or calf pain. Denies any other pedal complaints. Primary care physician is Yuridia Michael MD.    ROS:    Constitutional: Denies nausea, vomiting, fever, chills. Neurologic: Denies numbness, tingling, and burning in the feet. Vascular: Denies symptoms of lower extremity claudication. Skin: Denies open wounds. Otherwise negative except as noted in the HPI. Objective     Vitals:    09/26/22 1241   BP: 126/64   Pulse: 57       Lab Results   Component Value Date    LABA1C 5.7 05/16/2022       Physical Exam:  General:  Alert and oriented x3. In no acute distress. Problem focus examination to the left lower extremity:    Incision site is well coapted without evidence of dehiscence. Three sutures remain to the dorsal incisions of the 1st, 2nd and 3rd metatarsals. Mild edema surrounding surgical site. No erythema present. No drainage. No lymphangitis. No surrounding cellulitis. No signs of gross infection.      Patient has no pain to palpation of calf. The calf is soft, supple and nontender. Negative Tad's test.  Satisfactory alignment is noted. Pedal pulses are palpable. Capillary refill time is less than three seconds to all digits. Sensations are intact to light touch. Assessment   Darrick Pickens is a 57 y.o. male with     Diagnosis Orders   1. Post-operative state  XR FOOT LEFT (2 VIEWS)             Plan   Patient examined and evaluated  The patient was educated on clinical examination findings, postoperative prognosis and protocol. All questions were answered to patient's apparent satisfaction. X-rays were reviewed with the patient at today's visit. Adequate healing across the fusion site appreciated. Hardware still intact. New order for x-rays given to patient  Rx for [employer] cream. Instructed pt to use Voltaren in the mean time until receives prescription. Patient to ambulating in normal shoe gear as tolerated. Educated patient on custom foot orthotics. Patient states he has many pairs and currently does not wear any and is not interested at this time. Patient to RTC in 2 weeks  Discussed with Dr. Zoltan Delong    No orders of the defined types were placed in this encounter.     Orders Placed This Encounter   Procedures    XR FOOT LEFT (2 VIEWS)     Standing Status:   Future     Standing Expiration Date:   9/26/2023     Order Specific Question:   Reason for exam:     Answer:   post op state           Bridgette Senior DPM   Podiatric Medicine & Surgery   9/26/2022 at 1:29 PM

## 2022-09-29 ENCOUNTER — HOSPITAL ENCOUNTER (OUTPATIENT)
Dept: GENERAL RADIOLOGY | Age: 62
Discharge: HOME | End: 2022-10-01
Payer: MEDICAID

## 2022-09-29 ENCOUNTER — HOSPITAL ENCOUNTER (OUTPATIENT)
Age: 62
Discharge: HOME | End: 2022-10-01
Payer: MEDICAID

## 2022-09-29 DIAGNOSIS — Z98.890 POST-OPERATIVE STATE: ICD-10-CM

## 2022-09-29 PROCEDURE — 73620 X-RAY EXAM OF FOOT: CPT

## 2022-10-06 RX ORDER — TAMSULOSIN HYDROCHLORIDE 0.4 MG/1
CAPSULE ORAL
Qty: 30 CAPSULE | Refills: 0 | Status: SHIPPED | OUTPATIENT
Start: 2022-10-06 | End: 2022-10-18 | Stop reason: SDUPTHER

## 2022-10-06 NOTE — TELEPHONE ENCOUNTER
Refill request for tamsulosin (FLOMAX) 0.4 MG capsule. If appropriate please send medication(s) to patients pharmacy. Next appt: 10/18/2022 -- Michael Nielson Maintenance   Topic Date Due    Diabetic retinal exam  Never done    Pneumococcal 0-64 years Vaccine (2 - PCV) 12/08/2016    Diabetic microalbuminuria test  03/15/2022    Flu vaccine (1) 08/01/2022    Depression Screen  01/18/2023    Colorectal Cancer Screen  01/29/2023    A1C test (Diabetic or Prediabetic)  05/16/2023    Lipids  05/16/2023    Diabetic foot exam  07/19/2023    DTaP/Tdap/Td vaccine (2 - Td or Tdap) 09/13/2026    Shingles vaccine  Completed    COVID-19 Vaccine  Completed    Hepatitis C screen  Completed    HIV screen  Completed    Hepatitis A vaccine  Aged Out    Hib vaccine  Aged Out    Meningococcal (ACWY) vaccine  Aged Out       Hemoglobin A1C (%)   Date Value   05/16/2022 5.7   09/07/2021 5.8   11/17/2020 5.8             ( goal A1C is < 7)   Microalb/Crt.  Ratio (mcg/mg creat)   Date Value   03/15/2021 CANNOT BE CALCULATED     LDL Cholesterol (mg/dL)   Date Value   05/16/2022 45       (goal LDL is <100)   AST (U/L)   Date Value   05/16/2022 24     ALT (U/L)   Date Value   05/16/2022 21     BUN (mg/dL)   Date Value   06/16/2022 15     BP Readings from Last 3 Encounters:   09/26/22 126/64   08/15/22 132/69   08/01/22 (!) 93/50          (goal 120/80)          Patient Active Problem List:     S/P hammer toe correction     Prediabetes     Gastroesophageal reflux disease     Essential hypertension     Smoker     A-fib (HCC)     Cataract     Primary osteoarthritis of both knees     Chronic midline low back pain without sciatica     Benign prostatic hyperplasia with incomplete bladder emptying     Hyperlipidemia     Hallux abducto valgus, left     Pain of left foot     Hammer toe of left foot     Pain in toe of left foot     Pain from implanted hardware     Contracture of toe, left

## 2022-10-06 NOTE — PROGRESS NOTES
Patient instructed to remove shoes and socks and instructed to sit in exam chair. Current PCP is Lan Villela MD and date of last visit was 6/21/22. Do you have a follow up visit scheduled?   No  If yes, the date is

## 2022-10-10 ENCOUNTER — OFFICE VISIT (OUTPATIENT)
Dept: PODIATRY | Age: 62
End: 2022-10-10
Payer: MEDICAID

## 2022-10-10 VITALS
BODY MASS INDEX: 23.49 KG/M2 | SYSTOLIC BLOOD PRESSURE: 127 MMHG | DIASTOLIC BLOOD PRESSURE: 80 MMHG | HEART RATE: 73 BPM | HEIGHT: 74 IN | WEIGHT: 183 LBS

## 2022-10-10 DIAGNOSIS — L84 CALLUS OF FOOT: ICD-10-CM

## 2022-10-10 DIAGNOSIS — M96.0 NONUNION AFTER ARTHRODESIS: Primary | ICD-10-CM

## 2022-10-10 PROCEDURE — 11055 PARING/CUTG B9 HYPRKER LES 1: CPT

## 2022-10-10 PROCEDURE — G8484 FLU IMMUNIZE NO ADMIN: HCPCS

## 2022-10-10 PROCEDURE — G8427 DOCREV CUR MEDS BY ELIG CLIN: HCPCS

## 2022-10-10 PROCEDURE — 99213 OFFICE O/P EST LOW 20 MIN: CPT

## 2022-10-10 PROCEDURE — 1036F TOBACCO NON-USER: CPT

## 2022-10-10 PROCEDURE — G8420 CALC BMI NORM PARAMETERS: HCPCS

## 2022-10-10 PROCEDURE — 3017F COLORECTAL CA SCREEN DOC REV: CPT

## 2022-10-10 NOTE — PROGRESS NOTES
[facility]  [address] [place] [address] [address] [facility], [address]  Tel: [phone]   Fax: [phone]    Subjective     Procedure:   1st Metatarsophalanjeal joint arthrodesis, left foot. Removal of hardware 2nd metatarsal, left foot. Revision hammertoe correction 2nd digit left foot. Removal of hardware 3rd metatarsal left foot. Revision hammertoe correction 3rd digit left foot. Flexor tenotomy 4th digit left foot. DOS: 6/27/2022    Interval History, 10/10/22  This 57 y.o. male presents for a follow-up visit, reports increased pain to the left foot when walking. He states that he has been walking in normal shoes and has been working quite often. Patient is wearing normal tennis shoes. Patient states that he has had some shooting pain that is increased over the last 6 weeks and has usually been pain in the morning in the evening. Patient also states that his calluses bilateral are painful. He has not been icing or elevating as instructed. Denies any constitutional symptoms. Primary care physician is Lance Abreu MD.    ROS:    Constitutional: Denies nausea, vomiting, fever, chills. Neurologic: Denies numbness, tingling, and burning in the feet. Vascular: Denies symptoms of lower extremity claudication. Skin: Denies open wounds. Otherwise negative except as noted in the HPI. Objective     Vitals:    10/10/22 1238   BP: 127/80   Pulse: 73       Lab Results   Component Value Date    LABA1C 5.7 05/16/2022       Physical Exam:  General:  Alert and oriented x3. In no acute distress. Problem focus examination to the left lower extremity:  Left foot. Incision site is well coapted without evidence of dehiscence. Significant edema surrounding surgical site. No erythema present. No drainage. No lymphangitis. No surrounding cellulitis. No signs of gross infection. Callus of met head to the second and third toes plantarly, bilateral.    Patient has no pain to palpation of calf. The calf is soft, supple and nontender. Negative Tad's test.  Satisfactory alignment is noted. Limited ROM to 1st MPJ. Not able to appreciate tenting of skin from hardware. Pedal pulses are palpable. Capillary refill time is less than three seconds to all digits. Sensations are intact to light touch. Assessment   Omega Heller is a 57 y.o. male with     Diagnosis Orders   1. Nonunion after arthrodesis  CT FOOT LEFT WO CONTRAST    Vitamin D 25 Hydroxy             Plan   Patient examined and evaluated  The patient was educated on clinical examination findings, postoperative prognosis and protocol. All questions were answered to patient's apparent satisfaction. Conservative options such as orthotic, Uribe's extension, bone stimulator, wearing of a cast or a removable boot. Patient states that he would not likely stay off of this. Were discussed with patient as well as surgical options for nonunion. Patient elects to go with conservative options at this time. Surgical intervention later on when time is amenable to patient schedule would likely be revision of first MPJ fusion with bone graft. Patient states that he wants surgery done at a later point time if at all. X-rays were reviewed with the patient at today's visit. Notable joint space at the left first MPJ. Findings consistent with nonunion at this point time and postoperative course. Hardware is intact, no backing out of any screws or plates. Patient to continue protected weightbearing to left foot with a shoe. CT left foot order to evaluate osseous or fibrous union of left first MPJ fusion. Callus sub 2,3 debrided. Application of bilateral metatarsal pads applied to patient's feet. Educated patient that if these provide pain relief that orthotics in the future might be a good option for the patient. Order for [employer] bone stimulator. Patient to follow-up with [employer] reps for using the stimulator for healing.   Patient to RTC in 4 weeks  Please, call the office with any questions or concerns       Orders Placed This Encounter   Procedures    CT FOOT LEFT WO CONTRAST     Standing Status:   Future     Standing Expiration Date:   10/11/2023     Order Specific Question:   Reason for exam:     Answer:   nonunion and pain    Vitamin D 25 Hydroxy     Standing Status:   Future     Standing Expiration Date:   10/10/2023           Romana Fragmin, DPM   Podiatric Medicine & Surgery   10/10/2022 at 2:54 PM

## 2022-10-18 ENCOUNTER — HOSPITAL ENCOUNTER (OUTPATIENT)
Age: 62
Setting detail: SPECIMEN
Discharge: HOME | End: 2022-10-18

## 2022-10-18 ENCOUNTER — OFFICE VISIT (OUTPATIENT)
Dept: INTERNAL MEDICINE | Age: 62
End: 2022-10-18
Payer: MEDICAID

## 2022-10-18 VITALS
TEMPERATURE: 97.9 F | SYSTOLIC BLOOD PRESSURE: 134 MMHG | WEIGHT: 201 LBS | BODY MASS INDEX: 25.8 KG/M2 | DIASTOLIC BLOOD PRESSURE: 74 MMHG | HEIGHT: 74 IN | OXYGEN SATURATION: 97 %

## 2022-10-18 DIAGNOSIS — K21.9 GASTROESOPHAGEAL REFLUX DISEASE, UNSPECIFIED WHETHER ESOPHAGITIS PRESENT: ICD-10-CM

## 2022-10-18 DIAGNOSIS — E78.5 HYPERLIPIDEMIA, UNSPECIFIED HYPERLIPIDEMIA TYPE: ICD-10-CM

## 2022-10-18 DIAGNOSIS — Z23 FLU VACCINE NEED: ICD-10-CM

## 2022-10-18 DIAGNOSIS — I10 ESSENTIAL HYPERTENSION: ICD-10-CM

## 2022-10-18 DIAGNOSIS — M96.0 NONUNION AFTER ARTHRODESIS: ICD-10-CM

## 2022-10-18 DIAGNOSIS — I48.0 PAROXYSMAL ATRIAL FIBRILLATION (HCC): ICD-10-CM

## 2022-10-18 DIAGNOSIS — I10 ESSENTIAL HYPERTENSION: Primary | ICD-10-CM

## 2022-10-18 LAB
ANION GAP SERPL CALCULATED.3IONS-SCNC: 9 MMOL/L (ref 9–17)
BUN BLDV-MCNC: 11 MG/DL (ref 8–23)
CALCIUM SERPL-MCNC: 9.2 MG/DL (ref 8.6–10.4)
CHLORIDE BLD-SCNC: 105 MMOL/L (ref 98–107)
CO2: 26 MMOL/L (ref 20–31)
CREAT SERPL-MCNC: 1.09 MG/DL (ref 0.7–1.2)
GFR SERPL CREATININE-BSD FRML MDRD: >60 ML/MIN/1.73M2
GLUCOSE BLD-MCNC: 84 MG/DL (ref 70–99)
POTASSIUM SERPL-SCNC: 4.1 MMOL/L (ref 3.7–5.3)
SODIUM BLD-SCNC: 140 MMOL/L (ref 135–144)
VITAMIN D 25-HYDROXY: 20.4 NG/ML

## 2022-10-18 PROCEDURE — 3017F COLORECTAL CA SCREEN DOC REV: CPT | Performed by: STUDENT IN AN ORGANIZED HEALTH CARE EDUCATION/TRAINING PROGRAM

## 2022-10-18 PROCEDURE — 99213 OFFICE O/P EST LOW 20 MIN: CPT | Performed by: STUDENT IN AN ORGANIZED HEALTH CARE EDUCATION/TRAINING PROGRAM

## 2022-10-18 PROCEDURE — 1036F TOBACCO NON-USER: CPT | Performed by: STUDENT IN AN ORGANIZED HEALTH CARE EDUCATION/TRAINING PROGRAM

## 2022-10-18 PROCEDURE — G8484 FLU IMMUNIZE NO ADMIN: HCPCS | Performed by: STUDENT IN AN ORGANIZED HEALTH CARE EDUCATION/TRAINING PROGRAM

## 2022-10-18 PROCEDURE — 99211 OFF/OP EST MAY X REQ PHY/QHP: CPT | Performed by: STUDENT IN AN ORGANIZED HEALTH CARE EDUCATION/TRAINING PROGRAM

## 2022-10-18 PROCEDURE — G8427 DOCREV CUR MEDS BY ELIG CLIN: HCPCS | Performed by: STUDENT IN AN ORGANIZED HEALTH CARE EDUCATION/TRAINING PROGRAM

## 2022-10-18 PROCEDURE — G8417 CALC BMI ABV UP PARAM F/U: HCPCS | Performed by: STUDENT IN AN ORGANIZED HEALTH CARE EDUCATION/TRAINING PROGRAM

## 2022-10-18 RX ORDER — AMLODIPINE BESYLATE 10 MG/1
10 TABLET ORAL DAILY
Qty: 90 TABLET | Refills: 2 | Status: SHIPPED | OUTPATIENT
Start: 2022-10-18

## 2022-10-18 RX ORDER — METOPROLOL TARTRATE 50 MG/1
50 TABLET, FILM COATED ORAL 2 TIMES DAILY
Qty: 180 TABLET | Refills: 2 | Status: SHIPPED | OUTPATIENT
Start: 2022-10-18

## 2022-10-18 RX ORDER — LISINOPRIL 10 MG/1
TABLET ORAL
Qty: 90 TABLET | Refills: 3 | Status: SHIPPED | OUTPATIENT
Start: 2022-10-18

## 2022-10-18 RX ORDER — TAMSULOSIN HYDROCHLORIDE 0.4 MG/1
0.4 CAPSULE ORAL DAILY
Qty: 30 CAPSULE | Refills: 2 | Status: SHIPPED | OUTPATIENT
Start: 2022-10-18

## 2022-10-18 RX ORDER — OMEPRAZOLE 20 MG/1
20 CAPSULE, DELAYED RELEASE ORAL DAILY
Qty: 30 CAPSULE | Refills: 3 | Status: SHIPPED | OUTPATIENT
Start: 2022-10-18

## 2022-10-18 RX ORDER — ATORVASTATIN CALCIUM 40 MG/1
40 TABLET, FILM COATED ORAL NIGHTLY
Qty: 90 TABLET | Refills: 2 | Status: SHIPPED | OUTPATIENT
Start: 2022-10-18

## 2022-10-18 ASSESSMENT — ENCOUNTER SYMPTOMS
TROUBLE SWALLOWING: 0
WHEEZING: 0
COUGH: 0
VOMITING: 0
DIARRHEA: 0
CHEST TIGHTNESS: 0
BACK PAIN: 0
BLOOD IN STOOL: 0
CONSTIPATION: 0
ABDOMINAL PAIN: 0
SORE THROAT: 0
RHINORRHEA: 0
SHORTNESS OF BREATH: 0
NAUSEA: 0

## 2022-10-18 ASSESSMENT — PATIENT HEALTH QUESTIONNAIRE - PHQ9
2. FEELING DOWN, DEPRESSED OR HOPELESS: 0
SUM OF ALL RESPONSES TO PHQ QUESTIONS 1-9: 0
SUM OF ALL RESPONSES TO PHQ QUESTIONS 1-9: 0
SUM OF ALL RESPONSES TO PHQ9 QUESTIONS 1 & 2: 0
SUM OF ALL RESPONSES TO PHQ QUESTIONS 1-9: 0
1. LITTLE INTEREST OR PLEASURE IN DOING THINGS: 0
SUM OF ALL RESPONSES TO PHQ QUESTIONS 1-9: 0

## 2022-10-18 NOTE — PROGRESS NOTES
HPI:  Dianne Funk is a.55 y.o. male coming into clinic regarding for follow up and to establish care      #HTN: controlled  Is on amlodipine 10 mg, lisinopril 10 mg and lopressor 50 mg  BID  Denies any chest pain, shortness of breath, palpitation swelling of lower leg    #Afib: controlled  Is on Lopresor and eliquis    #Nonunion after arthrodesis: still complaints of foot pain  Follows with podiatry  Is scheduled for CT scan of left foot      Chief Complaint   Patient presents with    Established New Doctor       History:  Past Medical History:   Diagnosis Date    Alcoholic ([facility] [address] 50/83/1860    Cocaine dependence ([facility] [address] 06/23/2021    GERD (gastroesophageal reflux disease)     Hallux abducto valgus     Hammer toe of left foot     History of atrial fibrillation     has followed with Dr. Frandy [facility] last visit 6/2021. On Eliquis    History of BPH     History of echocardiogram 2019    EF 62%    Hypertension     Dr. Kiera Peres    Osteoarthritis     Prediabetes     border line.  not on meds    Rheumatoid arthritis(714.0)     newly diagnosed    Wears glasses     Wellness examination 05/2022    Dr. Atilio Gross     Past Surgical History:   Procedure Laterality Date    COLONOSCOPY  october 2012    FOOT SURGERY      Left foot surgery HDS 2-4 and bunion     FOOT SURGERY Left 06/27/2022    1ST MPJ FUSION, REVISION HAMMER TOE CORRECTION 2ND, 3RD AND 4TH, REMOVAL HARDWARE 3RD AND 4TH METATARSAL     HAMMER TOE SURGERY Left 6/27/2022    1ST MPJ FUSION, REVISION HAMMER TOE CORRECTION 2ND, 3RD AND 4TH, REMOVAL HARDWARE 3RD AND 4TH METATARSAL performed by Tal Salamanca DPM at [facility] OR     Family History   Problem Relation Age of Onset    High Blood Pressure Mother       Social History     Socioeconomic History    Marital status: Single     Spouse name: None    Number of children: None    Years of education: None    Highest education level: None   Tobacco Use    Smoking status: Former Packs/day: 0.25     Years: 20.00     Pack years: 5.00     Types: Cigarettes     Start date: 1/18/2002     Passive exposure: Past    Smokeless tobacco: Never    Tobacco comments:     1 pack a month   Vaping Use    Vaping Use: Never used   Substance and Sexual Activity    Alcohol use: Yes     Comment: weekends    Drug use: No     Social Determinants of Health     Financial Resource Strain: Low Risk     Difficulty of Paying Living Expenses: Not hard at all   Food Insecurity: No Food Insecurity    Worried About Running Out of Food in the Last Year: Never true    Ran Out of Food in the Last Year: Never true        ROS:  Review of Systems   Constitutional:  Negative for chills, fatigue and fever. HENT:  Negative for rhinorrhea, sore throat and trouble swallowing. Respiratory:  Negative for cough, chest tightness, shortness of breath and wheezing. Cardiovascular:  Negative for chest pain, palpitations and leg swelling. Gastrointestinal:  Negative for abdominal pain, blood in stool, constipation, diarrhea, nausea and vomiting. Genitourinary:  Negative for difficulty urinating, dysuria, frequency, hematuria and urgency. Musculoskeletal:  Negative for back pain, joint swelling and neck pain. Foot pain   Skin:  Negative for pallor and rash. Neurological:  Negative for dizziness, tremors, weakness, numbness and headaches. Psychiatric/Behavioral:  Negative for confusion, decreased concentration and hallucinations. The patient is not nervous/anxious. PHYSICAL EXAM:      Vitals:    10/18/22 1023   BP: 134/74   Temp: 97.9 °F (36.6 °C)   SpO2: 97%     BP Readings from Last 3 Encounters:   10/18/22 134/74   10/10/22 127/80   09/26/22 126/64       Physical Exam  Constitutional:       Appearance: Normal appearance. He is normal weight. HENT:      Head: Normocephalic and atraumatic. Eyes:      Extraocular Movements: Extraocular movements intact.       Conjunctiva/sclera: Conjunctivae normal.      Pupils: Pupils are equal, round, and reactive to light. Cardiovascular:      Rate and Rhythm: Normal rate and regular rhythm. Heart sounds: Normal heart sounds. Pulmonary:      Effort: Pulmonary effort is normal.      Breath sounds: Normal breath sounds. Abdominal:      General: Abdomen is flat. Bowel sounds are normal.      Palpations: Abdomen is soft. Musculoskeletal:         General: Normal range of motion. Skin:     General: Skin is warm and dry. Neurological:      General: No focal deficit present. Mental Status: He is alert and oriented to person, place, and time. Mental status is at baseline. Psychiatric:         Mood and Affect: Mood normal.         Behavior: Behavior normal.         Thought Content:  Thought content normal.         Judgment: Judgment normal.       LABORATORY FINDINGS:    CBC:   Lab Results   Component Value Date/Time    WBC 6.0 06/16/2022 09:05 AM    HGB 12.9 06/16/2022 09:05 AM     06/16/2022 09:05 AM     BMP:    Lab Results   Component Value Date/Time     06/16/2022 09:05 AM    K 3.7 06/16/2022 09:05 AM     06/16/2022 09:05 AM    CO2 17 06/16/2022 09:05 AM    BUN 15 06/16/2022 09:05 AM    CREATININE 1.34 06/16/2022 09:05 AM    GLUCOSE 84 06/16/2022 09:05 AM    GLUCOSE 89 02/02/2012 10:33 AM     Hemoglobin A1C:   Lab Results   Component Value Date/Time    LABA1C 5.7 05/16/2022 11:12 AM     Microalbumin Urine:   Lab Results   Component Value Date/Time    MICROALBUR <12 03/15/2021 01:48 PM     Lipid profile:   Lab Results   Component Value Date/Time    CHOL 111 05/16/2022 11:12 AM    TRIG 59 05/16/2022 11:12 AM    HDL 54 05/16/2022 11:12 AM     Thyroid functions:   Lab Results   Component Value Date/Time    TSH 1.66 05/25/2019 08:18 PM      Hepatic functions:   Lab Results   Component Value Date/Time    ALT 21 05/16/2022 11:12 AM    AST 24 05/16/2022 11:12 AM    PROT 7.9 05/16/2022 11:12 AM    BILITOT 0.46 05/16/2022 11:12 AM    LABALBU 4.6 05/16/2022 11:12 AM     Urine Analysis: No results found for: [id number] Chi Sandoval:      Health Maintenance Due   Topic Date Due    Diabetic retinal exam  Never done    Pneumococcal 0-64 years Vaccine (2 - PCV) 12/08/2016    Diabetic microalbuminuria test  03/15/2022    Flu vaccine (1) 08/01/2022       ASSESSMENT AND PLAN:      1. Essential hypertension  Controlled   continue with amlodipine 10 mg, lisinopril 10 mg and Lopressor 50 mg twice daily  Discussed about lifestyle modification  The following orders have not been finalized:  -     lisinopril (PRINIVIL;ZESTRIL) 10 MG tablet  -     atorvastatin (LIPITOR) 40 MG tablet  -     amLODIPine (NORVASC) 10 MG tablet    2. Hyperlipidemia, unspecified hyperlipidemia type  Stable, continue with Lipitor 40 mg  The following orders have not been finalized:  -     atorvastatin (LIPITOR) 40 MG tablet    3. Paroxysmal atrial fibrillation (HCC)  Stable, continue Lopressor and Eliquis  The following orders have not been finalized:  -     metoprolol tartrate (LOPRESSOR) 50 MG tablet     4. GERD: stable, c/w PPI  5. Nonunion after arthrodesis  Advised to follow with podiatry and have CT scan done      1. Maame Fuentes received counseling on the following healthy behaviors: nutrition and exercise    2. Reviewed prior labs and health maintenance. 3.  Discussed use, benefit, and side effects of prescribed medications. Barriers to medication compliance addressed. All patient questions answered. Pt voiced understanding. 4.  Continue current medications, diet and exercise.            Completed Refills               Requested Prescriptions     Pending Prescriptions Disp Refills    amLODIPine (NORVASC) 10 MG tablet 90 tablet 2     Sig: Take 1 tablet by mouth daily    atorvastatin (LIPITOR) 40 MG tablet 90 tablet 2     Sig: Take 1 tablet by mouth nightly    lisinopril (PRINIVIL;ZESTRIL) 10 MG tablet 90 tablet 3     Sig: TAKE 1 TABLET BY MOUTH DAILY    metoprolol tartrate (LOPRESSOR) 50 MG tablet 180 tablet 2     Sig: Take 1 tablet by mouth 2 times daily    omeprazole (PRILOSEC) 20 MG delayed release capsule 30 capsule 3    tamsulosin (FLOMAX) 0.4 MG capsule 30 capsule 0       5. Patient given educational materials - see patient instructions    6. Was a self-tracking handout given in paper form or via [employer]? Yes  If yes, see orders or list here. Patient voiced understanding and agreed to treatment plan. This note is created with the assistance of a speech-recognition program. While intending to generate a document that actually reflects the content of the visit, the document can still have some mistakes which may not have been identified and corrected by editing.       Electronically signed by:  Kb Lopez MD  10/18/2022

## 2022-10-19 ENCOUNTER — HOSPITAL ENCOUNTER (OUTPATIENT)
Age: 62
Discharge: HOME | End: 2022-10-19
Payer: MEDICAID

## 2022-10-19 ENCOUNTER — HOSPITAL ENCOUNTER (OUTPATIENT)
Dept: CT IMAGING | Age: 62
Discharge: HOME | End: 2022-10-21
Payer: MEDICAID

## 2022-10-19 DIAGNOSIS — M96.0 NONUNION AFTER ARTHRODESIS: ICD-10-CM

## 2022-10-19 DIAGNOSIS — I10 ESSENTIAL HYPERTENSION: ICD-10-CM

## 2022-10-19 LAB
CREATININE URINE: 111 MG/DL (ref 39–259)
MICROALBUMIN/CREAT 24H UR: <12 MG/L
MICROALBUMIN/CREAT UR-RTO: NORMAL MCG/MG CREAT

## 2022-10-19 PROCEDURE — 82043 UR ALBUMIN QUANTITATIVE: CPT

## 2022-10-19 PROCEDURE — 73700 CT LOWER EXTREMITY W/O DYE: CPT

## 2022-10-19 PROCEDURE — 82570 ASSAY OF URINE CREATININE: CPT

## 2022-10-19 RX ORDER — MELATONIN
1000 DAILY
Qty: 30 TABLET | Refills: 0 | Status: SHIPPED | OUTPATIENT
Start: 2022-10-19

## 2022-10-19 NOTE — RESULT ENCOUNTER NOTE
Please inform patient that his kidney function is within normal limits but vitamin D level is low so script is sent to pharmacy.  He needs to take 1 tablet of vitamin D daily

## 2022-10-20 ENCOUNTER — TELEPHONE (OUTPATIENT)
Dept: BURN CARE | Age: 62
End: 2022-10-20

## 2022-10-20 NOTE — TELEPHONE ENCOUNTER
Patient called to state that he contacted [facility] and was advised that they did not have an order on file for him. Moreover, patient advised during his appointment on 10/10/202 he was under the impression that a formula medication was sent to [facility]. Please review and advise.

## 2022-10-25 NOTE — PROGRESS NOTES
Patient instructed to remove shoes and socks and instructed to sit in exam chair. Current PCP is Mario Conner MD and date of last visit was 10/18/22. Do you have a follow up visit scheduled?   No  If yes, the date is unknown

## 2022-10-27 DIAGNOSIS — I10 ESSENTIAL HYPERTENSION: ICD-10-CM

## 2022-10-27 RX ORDER — AMLODIPINE BESYLATE 10 MG/1
10 TABLET ORAL DAILY
Qty: 90 TABLET | Refills: 2 | OUTPATIENT
Start: 2022-10-27

## 2022-11-01 DIAGNOSIS — I48.0 PAROXYSMAL ATRIAL FIBRILLATION (HCC): ICD-10-CM

## 2022-11-01 NOTE — TELEPHONE ENCOUNTER
Refill request for Eliquis. If appropriate please send medication(s) to patients pharmacy. Next appt: Patient is currently on wait list for provider. Last appt: 10/18/2022    Health Maintenance   Topic Date Due    Diabetic retinal exam  Never done    Pneumococcal 0-64 years Vaccine (2 - PCV) 12/08/2016    COVID-19 Vaccine (5 - Booster for Moderna series) 07/15/2022    Flu vaccine (1) 08/01/2022    Colorectal Cancer Screen  01/29/2023    A1C test (Diabetic or Prediabetic)  05/16/2023    Lipids  05/16/2023    Diabetic foot exam  07/19/2023    Depression Screen  10/18/2023    Diabetic microalbuminuria test  10/19/2023    DTaP/Tdap/Td vaccine (2 - Td or Tdap) 09/13/2026    Shingles vaccine  Completed    Hepatitis C screen  Completed    HIV screen  Completed    Hepatitis A vaccine  Aged Out    Hib vaccine  Aged Out    Meningococcal (ACWY) vaccine  Aged Out       Hemoglobin A1C (%)   Date Value   05/16/2022 5.7   09/07/2021 5.8   11/17/2020 5.8             ( goal A1C is < 7)   Microalb/Crt.  Ratio (mcg/mg creat)   Date Value   10/19/2022 Can not be calculated     LDL Cholesterol (mg/dL)   Date Value   05/16/2022 45       (goal LDL is <100)   AST (U/L)   Date Value   05/16/2022 24     ALT (U/L)   Date Value   05/16/2022 21     BUN (mg/dL)   Date Value   10/18/2022 11     BP Readings from Last 3 Encounters:   10/18/22 134/74   10/10/22 127/80   09/26/22 126/64          (goal 120/80)          Patient Active Problem List:     S/P hammer toe correction     Prediabetes     Gastroesophageal reflux disease     Essential hypertension     Smoker     A-fib (HCC)     Cataract     Primary osteoarthritis of both knees     Chronic midline low back pain without sciatica     Benign prostatic hyperplasia with incomplete bladder emptying     Hyperlipidemia     Hallux abducto valgus, left     Pain of left foot     Hammer toe of left foot     Pain in toe of left foot     Pain from implanted hardware     Contracture of toe, left

## 2022-11-02 DIAGNOSIS — I48.0 PAROXYSMAL ATRIAL FIBRILLATION (HCC): ICD-10-CM

## 2022-11-02 NOTE — TELEPHONE ENCOUNTER
Faxed refill request from [address] [place] [address] for Eliquis 5 mg. Patient is on the waitlist for an appt. In January 2023. Health Maintenance   Topic Date Due    Diabetic retinal exam  Never done    Pneumococcal 0-64 years Vaccine (2 - PCV) 12/08/2016    COVID-19 Vaccine (5 - Booster for Moderna series) 07/15/2022    Flu vaccine (1) 08/01/2022    Colorectal Cancer Screen  01/29/2023    A1C test (Diabetic or Prediabetic)  05/16/2023    Lipids  05/16/2023    Diabetic foot exam  07/19/2023    Depression Screen  10/18/2023    Diabetic microalbuminuria test  10/19/2023    DTaP/Tdap/Td vaccine (2 - Td or Tdap) 09/13/2026    Shingles vaccine  Completed    Hepatitis C screen  Completed    HIV screen  Completed    Hepatitis A vaccine  Aged Out    Hib vaccine  Aged Out    Meningococcal (ACWY) vaccine  Aged Out             (applicable per patient's age: Cancer Screenings, Depression Screening, Fall Risk Screening, Immunizations)    Hemoglobin A1C (%)   Date Value   05/16/2022 5.7   09/07/2021 5.8   11/17/2020 5.8     Microalb/Crt.  Ratio (mcg/mg creat)   Date Value   10/19/2022 Can not be calculated     LDL Cholesterol (mg/dL)   Date Value   05/16/2022 45     AST (U/L)   Date Value   05/16/2022 24     ALT (U/L)   Date Value   05/16/2022 21     BUN (mg/dL)   Date Value   10/18/2022 11      (goal A1C is < 7)   (goal LDL is <100) need 30-50% reduction from baseline     BP Readings from Last 3 Encounters:   10/18/22 134/74   10/10/22 127/80   09/26/22 126/64    (goal /80)      All Future Testing planned in [facility]:  Lab Frequency Next Occurrence   Lipid, Fasting Once 04/26/2022   XR FOOT LEFT (MIN 3 VIEWS) Once 06/06/2022   XR FOOT RIGHT (MIN 3 VIEWS) Once 06/06/2022       Next Visit Date:  Future Appointments   Date Time Provider Sheldon Eubanks   11/7/2022  1:15 PM En Granados DPM [id number]            Patient Active Problem List:     S/P hammer toe correction     Prediabetes     Gastroesophageal reflux disease     Essential hypertension     Smoker     A-fib ([facility] [address]     Cataract     Primary osteoarthritis of both knees     Chronic midline low back pain without sciatica     Benign prostatic hyperplasia with incomplete bladder emptying     Hyperlipidemia     Hallux abducto valgus, left     Pain of left foot     Hammer toe of left foot     Pain in toe of left foot     Pain from implanted hardware     Contracture of toe, left

## 2022-11-07 ENCOUNTER — OFFICE VISIT (OUTPATIENT)
Dept: PODIATRY | Age: 62
End: 2022-11-07
Payer: MEDICAID

## 2022-11-07 VITALS
BODY MASS INDEX: 25.8 KG/M2 | HEIGHT: 74 IN | SYSTOLIC BLOOD PRESSURE: 129 MMHG | WEIGHT: 201 LBS | DIASTOLIC BLOOD PRESSURE: 72 MMHG | HEART RATE: 62 BPM

## 2022-11-07 DIAGNOSIS — M79.672 LEFT FOOT PAIN: ICD-10-CM

## 2022-11-07 DIAGNOSIS — Z98.890 POST-OPERATIVE STATE: ICD-10-CM

## 2022-11-07 DIAGNOSIS — L84 CALLUS OF FOOT: ICD-10-CM

## 2022-11-07 DIAGNOSIS — E11.9 TYPE 2 DIABETES MELLITUS WITHOUT COMPLICATION, WITHOUT LONG-TERM CURRENT USE OF INSULIN (HCC): ICD-10-CM

## 2022-11-07 DIAGNOSIS — M96.0 NONUNION AFTER ARTHRODESIS: Primary | ICD-10-CM

## 2022-11-07 PROCEDURE — G8427 DOCREV CUR MEDS BY ELIG CLIN: HCPCS

## 2022-11-07 PROCEDURE — G8484 FLU IMMUNIZE NO ADMIN: HCPCS

## 2022-11-07 PROCEDURE — 11056 PARNG/CUTG B9 HYPRKR LES 2-4: CPT

## 2022-11-07 PROCEDURE — 2022F DILAT RTA XM EVC RTNOPTHY: CPT

## 2022-11-07 PROCEDURE — 3078F DIAST BP <80 MM HG: CPT

## 2022-11-07 PROCEDURE — 3044F HG A1C LEVEL LT 7.0%: CPT

## 2022-11-07 PROCEDURE — 3074F SYST BP LT 130 MM HG: CPT

## 2022-11-07 PROCEDURE — 4004F PT TOBACCO SCREEN RCVD TLK: CPT

## 2022-11-07 PROCEDURE — 99214 OFFICE O/P EST MOD 30 MIN: CPT

## 2022-11-07 PROCEDURE — 3017F COLORECTAL CA SCREEN DOC REV: CPT

## 2022-11-07 PROCEDURE — G8417 CALC BMI ABV UP PARAM F/U: HCPCS

## 2022-11-07 NOTE — PROGRESS NOTES
[address]  [address] [place] [address], Antonio S Ryan Lucas  Tel: [phone]   Fax: [phone]    Subjective     Procedure:   1st Metatarsophalanjeal joint arthrodesis, left foot. Removal of hardware 2nd metatarsal, left foot. Revision hammertoe correction 2nd digit left foot. Removal of hardware 3rd metatarsal left foot. Revision hammertoe correction 3rd digit left foot. Flexor tenotomy 4th digit left foot. DOS: 6/27/2022    Interval History, 11/8/22  This 57 y.o. male presents for a follow-up visit, reports increased pain to the left foot when walking. He states that he has been walking in normal shoes and has been working quite often. Patient is wearing normal tennis shoes. Patient returns with CT results today showing nonunion. Patient also states that his calluses bilateral plantar foot are painful. He has not been icing or elevating as instructed. Denies any constitutional symptoms. Primary care physician is Molly Inman MD.    ROS:    Constitutional: Denies nausea, vomiting, fever, chills. Neurologic: Denies numbness, tingling, and burning in the feet. Vascular: Denies symptoms of lower extremity claudication. Skin: Denies open wounds. Otherwise negative except as noted in the HPI. Objective     Vitals:    11/07/22 1259   BP: 129/72   Pulse: 62       Lab Results   Component Value Date    LABA1C 5.7 05/16/2022       Physical Exam:  General:  Alert and oriented x3. In no acute distress. Problem focus examination to the left lower extremity:  Left foot. Incision site is well coapted without evidence of dehiscence. Mild edema surrounding surgical site. No erythema present. No drainage. No lymphangitis. No surrounding cellulitis. No signs of gross infection. Callus of met head to the second and third toes plantarly, bilateral.    Limited range of motion to first MTPJ with crepitus noted, consistent with nonunion unionized bone.   Mild pain with palpation to the first metatarsal head and base the proximal phalanx. No prominent hardware noted. Patient has no pain to palpation of calf. The calf is soft, supple and nontender. Negative Tad's test.  Satisfactory alignment is noted. Not able to appreciate tenting of skin from hardware. Pedal pulses are palpable. Capillary refill time is less than three seconds to all digits. Sensations are intact to light touch. Imaging:   CT scan left foot 10/19/2022:  No bony fusion at the first MTPJ arthrodesis site. Assessment   Jones Zeng is a 57 y.o. male with     Diagnosis Orders   1. Nonunion after arthrodesis        2. Callus of foot        3. Post-operative state        4. Left foot pain        5. Type 2 diabetes mellitus without complication, without long-term current use of insulin ([facility] [address]                 Plan   Patient examined and evaluated  The patient was educated on clinical examination findings, postoperative prognosis and protocol. All questions were answered to patient's apparent satisfaction. Conservative options such as orthotic, Uribe's extension, bone stimulator, wearing of a cast or a removable boot. Patient states that he would not likely stay off of this. Discussed with patient as well as surgical options for nonunion. Patient elects to go with conservative options at this time. Surgical intervention including revision of first MPJ fusion with bone graft discussed. Patient states that he would be amendable to surgery but would prefer to do it after the winter months. Discussed applying for bone stimulator. Patient states if approved that he would use the device. I have reached out to [employer] and will send paperwork over for approval.  Imaging:   X-rays show notable joint space at the left first MPJ. Findings consistent with nonunion at this point time and postoperative course. Hardware is intact, no backing out of any screws or plates.   CT scan confirms no bony fusion at the arthrodesis site. Patient to continue protected weightbearing to left foot with a shoe. .  Callus sub 2,3 debrided bilaterally, using 15 blade down to the level of the dermis. No complications, no bleeding. Educated patient that if these provide pain relief that orthotics in the future might be a good option for the patient. Patient to RTC in 3 months. If bony fusion is noted with assistance of bone stimulator or otherwise then no surgical intervention will be needed. Please, call the office with any questions or concerns. Patient care discussed with Dr. Faisal Marquez. No orders of the defined types were placed in this encounter.           Duane Champion DPM   Podiatric Medicine & Surgery   11/9/2022 at 10:26 AM

## 2022-11-23 DIAGNOSIS — I10 ESSENTIAL HYPERTENSION: ICD-10-CM

## 2022-11-23 RX ORDER — LISINOPRIL 10 MG/1
TABLET ORAL
Qty: 90 TABLET | Refills: 2 | Status: SHIPPED | OUTPATIENT
Start: 2022-11-23

## 2022-11-23 NOTE — TELEPHONE ENCOUNTER
Refill request for lisinopril (PRINIVIL;ZESTRIL) 10 MG tablet. If appropriate please send medication(s) to patients pharmacy. Next appt: Patient is currently on wait list for provider. Last appt: 10/18/2022    Health Maintenance   Topic Date Due    Diabetic retinal exam  Never done    Pneumococcal 0-64 years Vaccine (2 - PCV) 12/08/2016    COVID-19 Vaccine (5 - Booster for Moderna series) 07/15/2022    Flu vaccine (1) 08/01/2022    Colorectal Cancer Screen  01/29/2023    A1C test (Diabetic or Prediabetic)  05/16/2023    Lipids  05/16/2023    Diabetic foot exam  07/19/2023    Depression Screen  10/18/2023    Diabetic microalbuminuria test  10/19/2023    DTaP/Tdap/Td vaccine (2 - Td or Tdap) 09/13/2026    Shingles vaccine  Completed    Hepatitis C screen  Completed    HIV screen  Completed    Hepatitis A vaccine  Aged Out    Hib vaccine  Aged Out    Meningococcal (ACWY) vaccine  Aged Out       Hemoglobin A1C (%)   Date Value   05/16/2022 5.7   09/07/2021 5.8   11/17/2020 5.8             ( goal A1C is < 7)   Microalb/Crt.  Ratio (mcg/mg creat)   Date Value   10/19/2022 Can not be calculated     LDL Cholesterol (mg/dL)   Date Value   05/16/2022 45       (goal LDL is <100)   AST (U/L)   Date Value   05/16/2022 24     ALT (U/L)   Date Value   05/16/2022 21     BUN (mg/dL)   Date Value   10/18/2022 11     BP Readings from Last 3 Encounters:   11/07/22 129/72   10/18/22 134/74   10/10/22 127/80          (goal 120/80)          Patient Active Problem List:     S/P hammer toe correction     Prediabetes     Gastroesophageal reflux disease     Essential hypertension     Smoker     A-fib (HCC)     Cataract     Primary osteoarthritis of both knees     Chronic midline low back pain without sciatica     Benign prostatic hyperplasia with incomplete bladder emptying     Hyperlipidemia     Hallux abducto valgus, left     Pain of left foot     Hammer toe of left foot     Pain in toe of left foot     Pain from implanted hardware Contracture of toe, left

## 2022-11-30 DIAGNOSIS — I10 ESSENTIAL HYPERTENSION: ICD-10-CM

## 2022-12-01 RX ORDER — LISINOPRIL 10 MG/1
TABLET ORAL
Qty: 90 TABLET | Refills: 2 | OUTPATIENT
Start: 2022-12-01

## 2022-12-21 DIAGNOSIS — I48.0 PAROXYSMAL ATRIAL FIBRILLATION (HCC): ICD-10-CM

## 2022-12-21 NOTE — TELEPHONE ENCOUNTER
Request for eliquis      Next Visit Date:last seen 10/18/22  Future Appointments   Date Time Provider Sheldon Eubanks   2/13/2023 12:45 PM Reggie Fernandez [facility]   Topic Date Due    Diabetic retinal exam  Never done    Pneumococcal 0-64 years Vaccine (2 - PCV) 12/08/2016    COVID-19 Vaccine (5 - Booster for Moderna series) 07/15/2022    Flu vaccine (1) 08/01/2022    Colorectal Cancer Screen  01/29/2023    A1C test (Diabetic or Prediabetic)  05/16/2023    Lipids  05/16/2023    Diabetic foot exam  07/19/2023    Depression Screen  10/18/2023    Diabetic microalbuminuria test  10/19/2023    DTaP/Tdap/Td vaccine (2 - Td or Tdap) 09/13/2026    Shingles vaccine  Completed    Hepatitis C screen  Completed    HIV screen  Completed    Hepatitis A vaccine  Aged Out    Hib vaccine  Aged Out    Meningococcal (ACWY) vaccine  Aged Out       Hemoglobin A1C (%)   Date Value   05/16/2022 5.7   09/07/2021 5.8   11/17/2020 5.8             ( goal A1C is < 7)   Microalb/Crt.  Ratio (mcg/mg creat)   Date Value   10/19/2022 Can not be calculated     LDL Cholesterol (mg/dL)   Date Value   05/16/2022 45       (goal LDL is <100)   AST (U/L)   Date Value   05/16/2022 24     ALT (U/L)   Date Value   05/16/2022 21     BUN (mg/dL)   Date Value   10/18/2022 11     BP Readings from Last 3 Encounters:   11/07/22 129/72   10/18/22 134/74   10/10/22 127/80          (goal 120/80)    All Future Testing planned in [facility]  Lab Frequency Next Occurrence   Lipid, Fasting Once 04/26/2022   XR FOOT LEFT (MIN 3 VIEWS) Once 06/06/2022   XR FOOT RIGHT (MIN 3 VIEWS) Once 06/06/2022         Patient Active Problem List:     S/P hammer toe correction     Prediabetes     Gastroesophageal reflux disease     Essential hypertension     Smoker     A-fib (HCC)     Cataract     Primary osteoarthritis of both knees     Chronic midline low back pain without sciatica     Benign prostatic hyperplasia with incomplete bladder emptying     Hyperlipidemia     Hallux abducto valgus, left     Pain of left foot     Hammer toe of left foot     Pain in toe of left foot     Pain from implanted hardware     Contracture of toe, left

## 2023-01-17 NOTE — PROGRESS NOTES
Patient instructed to remove shoes and socks and instructed to sit in exam chair. Current PCP is Kristofer Thomson MD and date of last visit was 02/09/2023. Do you have a follow up visit scheduled?   No  If yes, the date is unknown

## 2023-01-18 RX ORDER — TAMSULOSIN HYDROCHLORIDE 0.4 MG/1
0.4 CAPSULE ORAL DAILY
Qty: 30 CAPSULE | Refills: 2 | Status: SHIPPED | OUTPATIENT
Start: 2023-01-18

## 2023-01-18 NOTE — TELEPHONE ENCOUNTER
Request for flomax. Next joselin on 2/9/23    Next Visit Date:  Future Appointments   Date Time Provider Sheldon Eubanks   2/9/2023  9:40 AM Oanh Browning MD [facility]   2/13/2023 12:45 PM Sosa Kennedy DPM [facility]   Topic Date Due    Diabetic retinal exam  Never done    Pneumococcal 0-64 years Vaccine (2 - PCV) 12/08/2016    COVID-19 Vaccine (5 - Booster for Moderna series) 07/15/2022    Flu vaccine (1) 08/01/2022    Colorectal Cancer Screen  01/29/2023    A1C test (Diabetic or Prediabetic)  05/16/2023    Lipids  05/16/2023    Diabetic foot exam  07/19/2023    Depression Screen  10/18/2023    GFR test (Diabetes, CKD 3-4, OR last GFR 15-59)  10/18/2023    Diabetic Alb to Cr ratio (uACR) test  10/19/2023    DTaP/Tdap/Td vaccine (2 - Td or Tdap) 09/13/2026    Shingles vaccine  Completed    Hepatitis C screen  Completed    HIV screen  Completed    Hepatitis A vaccine  Aged Out    Hib vaccine  Aged Out    Meningococcal (ACWY) vaccine  Aged Out       Hemoglobin A1C (%)   Date Value   05/16/2022 5.7   09/07/2021 5.8   11/17/2020 5.8             ( goal A1C is < 7)   Microalb/Crt.  Ratio (mcg/mg creat)   Date Value   10/19/2022 Can not be calculated     LDL Cholesterol (mg/dL)   Date Value   05/16/2022 45       (goal LDL is <100)   AST (U/L)   Date Value   05/16/2022 24     ALT (U/L)   Date Value   05/16/2022 21     BUN (mg/dL)   Date Value   10/18/2022 11     BP Readings from Last 3 Encounters:   11/07/22 129/72   10/18/22 134/74   10/10/22 127/80          (goal 120/80)    All Future Testing planned in [facility]  Lab Frequency Next Occurrence   Lipid, Fasting Once 04/26/2022   XR FOOT LEFT (MIN 3 VIEWS) Once 06/06/2022   XR FOOT RIGHT (MIN 3 VIEWS) Once 06/06/2022         Patient Active Problem List:     S/P hammer toe correction     Prediabetes     Gastroesophageal reflux disease     Essential hypertension     Smoker     A-fib ([address]     Cataract     Primary osteoarthritis of both knees     Chronic midline low back pain without sciatica     Benign prostatic hyperplasia with incomplete bladder emptying     Hyperlipidemia     Hallux abducto valgus, left     Pain of left foot     Hammer toe of left foot     Pain in toe of left foot     Pain from implanted hardware     Contracture of toe, left

## 2023-01-25 NOTE — TELEPHONE ENCOUNTER
Request for vitamin d3. Next joselin on 2/9/23    Next Visit Date:  Future Appointments   Date Time Provider Sheldon Eubanks   2/9/2023  9:40 AM Severiano Cake, MD [facility] IM [facility]   2/13/2023 12:45 PM Major Koenig DPM [facility]   Topic Date Due    Diabetic retinal exam  Never done    Pneumococcal 0-64 years Vaccine (2 - PCV) 12/08/2016    COVID-19 Vaccine (5 - Booster for Moderna series) 07/15/2022    Flu vaccine (1) 08/01/2022    Colorectal Cancer Screen  01/29/2023    A1C test (Diabetic or Prediabetic)  05/16/2023    Lipids  05/16/2023    Diabetic foot exam  07/19/2023    Depression Screen  10/18/2023    GFR test (Diabetes, CKD 3-4, OR last GFR 15-59)  10/18/2023    Diabetic Alb to Cr ratio (uACR) test  10/19/2023    DTaP/Tdap/Td vaccine (2 - Td or Tdap) 09/13/2026    Shingles vaccine  Completed    Hepatitis C screen  Completed    HIV screen  Completed    Hepatitis A vaccine  Aged Out    Hib vaccine  Aged Out    Meningococcal (ACWY) vaccine  Aged Out       Hemoglobin A1C (%)   Date Value   05/16/2022 5.7   09/07/2021 5.8   11/17/2020 5.8             ( goal A1C is < 7)   Microalb/Crt.  Ratio (mcg/mg creat)   Date Value   10/19/2022 Can not be calculated     LDL Cholesterol (mg/dL)   Date Value   05/16/2022 45       (goal LDL is <100)   AST (U/L)   Date Value   05/16/2022 24     ALT (U/L)   Date Value   05/16/2022 21     BUN (mg/dL)   Date Value   10/18/2022 11     BP Readings from Last 3 Encounters:   11/07/22 129/72   10/18/22 134/74   10/10/22 127/80          (goal 120/80)    All Future Testing planned in [facility]  Lab Frequency Next Occurrence   XR FOOT LEFT (MIN 3 VIEWS) Once 06/06/2022   XR FOOT RIGHT (MIN 3 VIEWS) Once 06/06/2022         Patient Active Problem List:     S/P hammer toe correction     Prediabetes     Gastroesophageal reflux disease     Essential hypertension     Smoker     A-fib (HCC)     Cataract     Primary osteoarthritis of both knees     Chronic midline low back pain without sciatica     Benign prostatic hyperplasia with incomplete bladder emptying     Hyperlipidemia     Hallux abducto valgus, left     Pain of left foot     Hammer toe of left foot     Pain in toe of left foot     Pain from implanted hardware     Contracture of toe, left

## 2023-01-26 RX ORDER — CHOLECALCIFEROL (VITAMIN D3) 25 MCG
TABLET ORAL
Qty: 30 TABLET | Refills: 0 | Status: SHIPPED | OUTPATIENT
Start: 2023-01-26

## 2023-02-01 NOTE — TELEPHONE ENCOUNTER
Health Maintenance   Topic Date Due    Diabetic retinal exam  Never done    Pneumococcal 0-64 years Vaccine (2 - PCV) 12/08/2016    COVID-19 Vaccine (5 - Booster for Moderna series) 07/15/2022    Flu vaccine (1) 08/01/2022    Colorectal Cancer Screen  01/29/2023    A1C test (Diabetic or Prediabetic)  05/16/2023    Lipids  05/16/2023    Diabetic foot exam  07/19/2023    Depression Screen  10/18/2023    GFR test (Diabetes, CKD 3-4, OR last GFR 15-59)  10/18/2023    Diabetic Alb to Cr ratio (uACR) test  10/19/2023    DTaP/Tdap/Td vaccine (2 - Td or Tdap) 09/13/2026    Shingles vaccine  Completed    Hepatitis C screen  Completed    HIV screen  Completed    Hepatitis A vaccine  Aged Out    Hib vaccine  Aged Out    Meningococcal (ACWY) vaccine  Aged Out             (applicable per patient's age: Cancer Screenings, Depression Screening, Fall Risk Screening, Immunizations)    Hemoglobin A1C (%)   Date Value   05/16/2022 5.7   09/07/2021 5.8   11/17/2020 5.8     Microalb/Crt.  Ratio (mcg/mg creat)   Date Value   10/19/2022 Can not be calculated     LDL Cholesterol (mg/dL)   Date Value   05/16/2022 45     AST (U/L)   Date Value   05/16/2022 24     ALT (U/L)   Date Value   05/16/2022 21     BUN (mg/dL)   Date Value   10/18/2022 11      (goal A1C is < 7)   (goal LDL is <100) need 30-50% reduction from baseline     BP Readings from Last 3 Encounters:   11/07/22 129/72   10/18/22 134/74   10/10/22 127/80    (goal /80)      All Future Testing planned in [facility]:  Lab Frequency Next Occurrence   XR FOOT LEFT (MIN 3 VIEWS) Once 06/06/2022   XR FOOT RIGHT (MIN 3 VIEWS) Once 06/06/2022       Next Visit Date:  Future Appointments   Date Time Provider Sheldon Eubanks   2/9/2023  9:40 AM Jose Abel MD [facility]   2/13/2023 12:45 PM India Rangel DPM [facility]            Patient Active Problem List:     S/P hammer toe correction     Prediabetes     Gastroesophageal reflux disease     Essential hypertension   Smoker     A-fib (HCC)     Cataract     Primary osteoarthritis of both knees     Chronic midline low back pain without sciatica     Benign prostatic hyperplasia with incomplete bladder emptying     Hyperlipidemia     Hallux abducto valgus, left     Pain of left foot     Hammer toe of left foot     Pain in toe of left foot     Pain from implanted hardware     Contracture of toe, left

## 2023-02-02 RX ORDER — OMEPRAZOLE 20 MG/1
20 CAPSULE, DELAYED RELEASE ORAL DAILY
Qty: 30 CAPSULE | Refills: 3 | Status: SHIPPED | OUTPATIENT
Start: 2023-02-02

## 2023-02-09 ENCOUNTER — OFFICE VISIT (OUTPATIENT)
Dept: INTERNAL MEDICINE | Age: 63
End: 2023-02-09
Payer: MEDICAID

## 2023-02-09 VITALS
SYSTOLIC BLOOD PRESSURE: 122 MMHG | HEART RATE: 52 BPM | HEIGHT: 74 IN | OXYGEN SATURATION: 95 % | WEIGHT: 225.2 LBS | DIASTOLIC BLOOD PRESSURE: 84 MMHG | BODY MASS INDEX: 28.9 KG/M2 | TEMPERATURE: 97.3 F

## 2023-02-09 DIAGNOSIS — E78.5 HYPERLIPIDEMIA, UNSPECIFIED HYPERLIPIDEMIA TYPE: ICD-10-CM

## 2023-02-09 DIAGNOSIS — Z12.11 COLON CANCER SCREENING: ICD-10-CM

## 2023-02-09 DIAGNOSIS — Z23 NEED FOR PNEUMOCOCCAL VACCINATION: ICD-10-CM

## 2023-02-09 DIAGNOSIS — I10 WELL-CONTROLLED HYPERTENSION: Primary | ICD-10-CM

## 2023-02-09 DIAGNOSIS — I48.0 PAROXYSMAL ATRIAL FIBRILLATION (HCC): ICD-10-CM

## 2023-02-09 DIAGNOSIS — Z23 NEED FOR INFLUENZA VACCINATION: ICD-10-CM

## 2023-02-09 PROCEDURE — G8482 FLU IMMUNIZE ORDER/ADMIN: HCPCS | Performed by: STUDENT IN AN ORGANIZED HEALTH CARE EDUCATION/TRAINING PROGRAM

## 2023-02-09 PROCEDURE — 3078F DIAST BP <80 MM HG: CPT | Performed by: STUDENT IN AN ORGANIZED HEALTH CARE EDUCATION/TRAINING PROGRAM

## 2023-02-09 PROCEDURE — 90686 IIV4 VACC NO PRSV 0.5 ML IM: CPT | Performed by: STUDENT IN AN ORGANIZED HEALTH CARE EDUCATION/TRAINING PROGRAM

## 2023-02-09 PROCEDURE — G8427 DOCREV CUR MEDS BY ELIG CLIN: HCPCS | Performed by: STUDENT IN AN ORGANIZED HEALTH CARE EDUCATION/TRAINING PROGRAM

## 2023-02-09 PROCEDURE — 3017F COLORECTAL CA SCREEN DOC REV: CPT | Performed by: STUDENT IN AN ORGANIZED HEALTH CARE EDUCATION/TRAINING PROGRAM

## 2023-02-09 PROCEDURE — 3074F SYST BP LT 130 MM HG: CPT | Performed by: STUDENT IN AN ORGANIZED HEALTH CARE EDUCATION/TRAINING PROGRAM

## 2023-02-09 PROCEDURE — 4004F PT TOBACCO SCREEN RCVD TLK: CPT | Performed by: STUDENT IN AN ORGANIZED HEALTH CARE EDUCATION/TRAINING PROGRAM

## 2023-02-09 PROCEDURE — G0009 ADMIN PNEUMOCOCCAL VACCINE: HCPCS | Performed by: STUDENT IN AN ORGANIZED HEALTH CARE EDUCATION/TRAINING PROGRAM

## 2023-02-09 PROCEDURE — G8417 CALC BMI ABV UP PARAM F/U: HCPCS | Performed by: STUDENT IN AN ORGANIZED HEALTH CARE EDUCATION/TRAINING PROGRAM

## 2023-02-09 PROCEDURE — 99213 OFFICE O/P EST LOW 20 MIN: CPT | Performed by: STUDENT IN AN ORGANIZED HEALTH CARE EDUCATION/TRAINING PROGRAM

## 2023-02-09 RX ORDER — LISINOPRIL 10 MG/1
10 TABLET ORAL DAILY
Qty: 30 TABLET | Refills: 5 | Status: SHIPPED | OUTPATIENT
Start: 2023-02-09

## 2023-02-09 RX ORDER — METOPROLOL TARTRATE 50 MG/1
50 TABLET, FILM COATED ORAL 2 TIMES DAILY
Qty: 60 TABLET | Refills: 5 | Status: SHIPPED | OUTPATIENT
Start: 2023-02-09

## 2023-02-09 RX ORDER — AMLODIPINE BESYLATE 10 MG/1
10 TABLET ORAL DAILY
Qty: 30 TABLET | Refills: 5 | Status: SHIPPED | OUTPATIENT
Start: 2023-02-09

## 2023-02-09 RX ORDER — ATORVASTATIN CALCIUM 40 MG/1
40 TABLET, FILM COATED ORAL NIGHTLY
Qty: 30 TABLET | Refills: 5 | Status: SHIPPED | OUTPATIENT
Start: 2023-02-09

## 2023-02-09 RX ORDER — TAMSULOSIN HYDROCHLORIDE 0.4 MG/1
0.4 CAPSULE ORAL DAILY
Qty: 30 CAPSULE | Refills: 5 | Status: SHIPPED | OUTPATIENT
Start: 2023-02-09

## 2023-02-09 RX ORDER — CHOLECALCIFEROL (VITAMIN D3) 25 MCG
TABLET ORAL
Qty: 30 TABLET | Refills: 5 | Status: SHIPPED | OUTPATIENT
Start: 2023-02-09

## 2023-02-09 SDOH — ECONOMIC STABILITY: HOUSING INSECURITY
IN THE LAST 12 MONTHS, WAS THERE A TIME WHEN YOU DID NOT HAVE A STEADY PLACE TO SLEEP OR SLEPT IN A SHELTER (INCLUDING NOW)?: NO

## 2023-02-09 SDOH — ECONOMIC STABILITY: FOOD INSECURITY: WITHIN THE PAST 12 MONTHS, THE FOOD YOU BOUGHT JUST DIDN'T LAST AND YOU DIDN'T HAVE MONEY TO GET MORE.: NEVER TRUE

## 2023-02-09 SDOH — ECONOMIC STABILITY: FOOD INSECURITY: WITHIN THE PAST 12 MONTHS, YOU WORRIED THAT YOUR FOOD WOULD RUN OUT BEFORE YOU GOT MONEY TO BUY MORE.: NEVER TRUE

## 2023-02-09 SDOH — ECONOMIC STABILITY: INCOME INSECURITY: HOW HARD IS IT FOR YOU TO PAY FOR THE VERY BASICS LIKE FOOD, HOUSING, MEDICAL CARE, AND HEATING?: NOT HARD AT ALL

## 2023-02-09 ASSESSMENT — PATIENT HEALTH QUESTIONNAIRE - PHQ9
1. LITTLE INTEREST OR PLEASURE IN DOING THINGS: 0
2. FEELING DOWN, DEPRESSED OR HOPELESS: 0
SUM OF ALL RESPONSES TO PHQ QUESTIONS 1-9: 0
SUM OF ALL RESPONSES TO PHQ9 QUESTIONS 1 & 2: 0

## 2023-02-09 ASSESSMENT — ENCOUNTER SYMPTOMS
ABDOMINAL PAIN: 0
NAUSEA: 0
WHEEZING: 0
SORE THROAT: 0
RHINORRHEA: 0
BLOOD IN STOOL: 0
VOMITING: 0
CONSTIPATION: 0
CHEST TIGHTNESS: 0
BACK PAIN: 0
SHORTNESS OF BREATH: 0
DIARRHEA: 0
COUGH: 0
TROUBLE SWALLOWING: 0

## 2023-02-09 NOTE — PROGRESS NOTES
HPI:  Dior Abebe is a.55 y.o. male coming into clinic regarding for follow up     #HTN: controlled  Is on amlodipine 10 mg, lisinopril 10 mg and lopressor 50 mg  BID  Denies any chest pain, shortness of breath, palpitation swelling of lower leg    #Afib: controlled  Is on Lopresor and eliquis    #Nonunion after arthrodesis:   Follows with podiatry  Is using bone stimulator daily  Has appointment coming up next Monday  Doesn't want to have surgery again    Quit smoking since  1st feb 2023  Is not planning to smoke again  Has 5 PPD so doesn't qualify for low dose CT      Chief Complaint   Patient presents with    Hypertension     143/70 today    Health Maintenance     Wants pneumonia and flu vaccines. See eye care at [facility]    Discuss Medications     Refills requested       History:  Past Medical History:   Diagnosis Date    Alcoholic ([facility] 22/15/3894    Cocaine dependence ([facility] 06/23/2021    GERD (gastroesophageal reflux disease)     Hallux abducto valgus     Hammer toe of left foot     History of atrial fibrillation     has followed with Dr. [facility] last visit 6/2021. On Eliquis    History of BPH     History of echocardiogram 2019    EF 62%    Hypertension     Dr. Angelina Gracia    Osteoarthritis     Prediabetes     border line.  not on meds    Rheumatoid arthritis(714.0)     newly diagnosed    Wears glasses     Wellness examination 05/2022    Dr. Aakash Liz     Past Surgical History:   Procedure Laterality Date    COLONOSCOPY  october 2012    FOOT SURGERY      Left foot surgery HDS 2-4 and bunion     FOOT SURGERY Left 06/27/2022    1ST MPJ FUSION, REVISION HAMMER TOE CORRECTION 2ND, 3RD AND 4TH, REMOVAL HARDWARE 3RD AND 4TH METATARSAL     HAMMER TOE SURGERY Left 6/27/2022    1ST MPJ FUSION, REVISION HAMMER TOE CORRECTION 2ND, 3RD AND 4TH, REMOVAL HARDWARE 3RD AND 4TH METATARSAL performed by Nai Dunne DPM at [address] [place] [address] History [facility] - Initial Consult Note  Today's Date and Time: 1/2/2020 , 9:35 PM    Impression :   · Rt great toe diabetic infection  · S/P I&D per Podiatry 12-31  · DM II not controlled  · CAD s/p stenting x 2  · Non compliance    Recommendations:     · Cefazolin 2gm IV q 8   · Diflucan 200 mg po daily    Medical Decision Making/Summary/Discussion:1/2/2020     ·   Infection Control Recommendations   · [place] Precautions    Antimicrobial Stewardship Recommendations     · Targeted therapy  · IV to oral conversion  · PK dosing    Coordination of Outpatient Care:   · Estimated Length of IV antimicrobials: TBD  · Patient will need Midline Catheter Insertion:  No  · Patient will need PICC line Insertion: No  · Patient will need: Home IV , [place],  SNF,  LTAC: No  · Patient will need outpatient wound care: TBD    Chief complaint/reason for consultation:   · Diabetic foot infection, uncontrolled DM      History of Present Illness:   Kaila Loja is a 37y.o.-year-old  female who was initially admitted on 12/30/2019. Patient seen at the request of Dr. Rola Nguyen:    Pt is a 36 yo woman visiting from [place]. She noted some Rt great toe pain on 12-24. By 12-30 the pain had progressed and her foot was swollen and tender so she presented to the ED for further evaluation. Pt also has a history of CAD with multiple stents in place, PE and IDDM. She reports that she lost her insurance 6 months ago and has not been taking her Brilinta, Eliquis or Insulin as a result. She denies any chills, fevers, night sweats or malaise prior to presenting to the ED. In the ED she was found to have abscess at the base of the Rt great toe with erythema and fluctuance noted. The wound did not penetrate to the bone, no induration or crepitus noted.       , .5    Pt was admitted, started on Zosyn and Vancomycin, and seen by podiatry, and I&D was Problem Relation Age of Onset    High Blood Pressure Mother       Social History     Socioeconomic History    Marital status: Single     Spouse name: None    Number of children: None    Years of education: None    Highest education level: None   Tobacco Use    Smoking status: Some Days     Packs/day: 0.25     Years: 20.00     Pack years: 5.00     Types: Cigarettes     Start date: 1/18/2002     Passive exposure: Past    Smokeless tobacco: Never    Tobacco comments:     1 pack a month   Vaping Use    Vaping Use: Never used   Substance and Sexual Activity    Alcohol use: Yes     Comment: weekends    Drug use: No     Social Determinants of Health     Financial Resource Strain: Low Risk     Difficulty of Paying Living Expenses: Not hard at all   Food Insecurity: No Food Insecurity    Worried About Running Out of Food in the Last Year: Never true    Ran Out of Food in the Last Year: Never true   Transportation Needs: Unknown    Lack of Transportation (Non-Medical): No   Housing Stability: Unknown    Unstable Housing in the Last Year: No        ROS:  Review of Systems   Constitutional:  Negative for chills, fatigue and fever. HENT:  Negative for rhinorrhea, sore throat and trouble swallowing. Respiratory:  Negative for cough, chest tightness, shortness of breath and wheezing. Cardiovascular:  Negative for chest pain, palpitations and leg swelling. Gastrointestinal:  Negative for abdominal pain, blood in stool, constipation, diarrhea, nausea and vomiting. Genitourinary:  Negative for difficulty urinating, dysuria, frequency, hematuria and urgency. Musculoskeletal:  Negative for back pain, joint swelling and neck pain. Foot pain   Skin:  Negative for pallor and rash. Neurological:  Negative for dizziness, tremors, weakness, numbness and headaches. Psychiatric/Behavioral:  Negative for confusion, decreased concentration and hallucinations. The patient is not nervous/anxious.       PHYSICAL EXAM: performed with serosanguinous drainage noted. MRI's of the foot showed:  No osteomyelitis or other acute osseous abnormality of the hindfoot. 2. Circumferential subcutaneous edema about the ankle and along the dorsum of   the midfoot compatible with bland edema versus cellulitis.  No deep soft   tissue ulceration or drainable fluid collection. 1. No evidence of osteomyelitis in the right forefoot. 2. Focal confluent soft tissue edema at the plantar aspect of the proximal   great toe, possibly focal cellulitis or phlegmon.  No definite organized   fluid collection to indicate abscess at this time.  This area measures   approximately 1.9 x 0.8 x 2.1 cm.   3. A couple low signal foci are seen within the plantar soft tissues of the   proximal great toe, favored to be vascular structures but possibility of soft   tissue gas is not entirely excluded. 4. Small joint effusion at the 1st MTP joint. Wound cultures are showing Grp B strep and Candida. CURRENT EXAMINATION: 1/2/2020    Afebrile  VS stable    The patient seen and evaluated at bedside. Patient is feeling better with no new acute issues or concerns today. Patient does not have any fevers, chills, cough, shortness of breath, chest pains or palpitations    Labs, X rays reviewed: 1/2/2020      BUN: 19  Cr: 1.2    WBC: 8.9  Hb: 11.0  Plat:  235    Cultures:  Urine:  ·   Blood:  · 12-31 x 2 no growth to date  Sputum :  ·   Wound:  · 12-31 Group B strep, Candida albicans    Rt foot/Wound:              Discussed with patient, RN, family. I have personally reviewed the past medical history, past surgical history, medications, social history, and family history, and I have updated the database accordingly.   Past Medical History:     Past Medical History:   Diagnosis Date    Hyperlipidemia     Hypertension     Hypothyroidism     Type II or unspecified type diabetes mellitus without mention of complication, not stated as uncontrolled     Urinary incontinence        Past Surgical  History:     Past Surgical History:   Procedure Laterality Date     SECTION      2 times    CHOLECYSTECTOMY      TOENAIL EXCISION         Medications:      sodium chloride flush  10 mL Intravenous BID    carvedilol  12.5 mg Oral BID WC    sodium chloride flush  10 mL Intravenous 2 times per day    sodium chloride flush  10 mL Intravenous 2 times per day    vancomycin  1,750 mg Intravenous Q24H    vancomycin (VANCOCIN) intermittent dosing (placeholder)   Other RX Placeholder    insulin glargine  40 Units Subcutaneous Nightly    sodium chloride flush  10 mL Intravenous BID    aspirin  81 mg Oral Daily    levothyroxine  50 mcg Oral Daily    lisinopril-hydrochlorothiazide  1 tablet Oral Daily    metFORMIN  1,000 mg Oral BID WC    simvastatin  20 mg Oral Nightly    apixaban  2.5 mg Oral BID    insulin lispro  0-18 Units Subcutaneous TID WC    insulin lispro  0-9 Units Subcutaneous Nightly       Social History:     Social History     Socioeconomic History    Marital status:      Spouse name: Not on file    Number of children: Not on file    Years of education: Not on file    Highest education level: Not on file   Occupational History    Not on file   Social Needs    Financial resource strain: Not on file    Food insecurity:     Worry: Not on file     Inability: Not on file    Transportation needs:     Medical: Not on file     Non-medical: Not on file   Tobacco Use    Smoking status: Former Smoker     Packs/day: 0.10   Substance and Sexual Activity    Alcohol use: No    Drug use: No    Sexual activity: Not on file   Lifestyle    Physical activity:     Days per week: Not on file     Minutes per session: Not on file    Stress: Not on file   Relationships    Social connections:     Talks on phone: Not on file     Gets together: Not on file     Attends [employer] service: Not on file     Active member of club or organization: Not on Vitals:    02/09/23 0919   BP: (!) 143/70   Pulse: 52   Temp: 97.3 °F (36.3 °C)     BP Readings from Last 3 Encounters:   02/09/23 (!) 143/70   11/07/22 129/72   10/18/22 134/74       Physical Exam  Constitutional:       Appearance: Normal appearance. He is normal weight. HENT:      Head: Normocephalic and atraumatic. Eyes:      Extraocular Movements: Extraocular movements intact. Conjunctiva/sclera: Conjunctivae normal.      Pupils: Pupils are equal, round, and reactive to light. Cardiovascular:      Rate and Rhythm: Normal rate and regular rhythm. Heart sounds: Normal heart sounds. Pulmonary:      Effort: Pulmonary effort is normal.      Breath sounds: Normal breath sounds. Abdominal:      General: Abdomen is flat. Bowel sounds are normal.      Palpations: Abdomen is soft. Musculoskeletal:         General: Normal range of motion. Skin:     General: Skin is warm and dry. Neurological:      General: No focal deficit present. Mental Status: He is alert and oriented to person, place, and time. Mental status is at baseline. Psychiatric:         Mood and Affect: Mood normal.         Behavior: Behavior normal.         Thought Content:  Thought content normal.         Judgment: Judgment normal.       LABORATORY FINDINGS:    CBC:   Lab Results   Component Value Date/Time    WBC 6.0 06/16/2022 09:05 AM    HGB 12.9 06/16/2022 09:05 AM     06/16/2022 09:05 AM     BMP:    Lab Results   Component Value Date/Time     10/18/2022 09:30 AM    K 4.1 10/18/2022 09:30 AM     10/18/2022 09:30 AM    CO2 26 10/18/2022 09:30 AM    BUN 11 10/18/2022 09:30 AM    CREATININE 1.09 10/18/2022 09:30 AM    GLUCOSE 84 10/18/2022 09:30 AM    GLUCOSE 89 02/02/2012 10:33 AM     Hemoglobin A1C:   Lab Results   Component Value Date/Time    LABA1C 5.7 05/16/2022 11:12 AM     Microalbumin Urine:   Lab Results   Component Value Date/Time    MICROALBUR <12 10/19/2022 08:43 AM     Lipid profile:   Lab Results   Component Value Date/Time    CHOL 111 05/16/2022 11:12 AM    TRIG 59 05/16/2022 11:12 AM    HDL 54 05/16/2022 11:12 AM     Thyroid functions:   Lab Results   Component Value Date/Time    TSH 1.66 05/25/2019 08:18 PM      Hepatic functions:   Lab Results   Component Value Date/Time    ALT 21 05/16/2022 11:12 AM    AST 24 05/16/2022 11:12 AM    PROT 7.9 05/16/2022 11:12 AM    BILITOT 0.46 05/16/2022 11:12 AM    LABALBU 4.6 05/16/2022 11:12 AM     Urine Analysis: No results found for: [id number] Kaylee:      Health Maintenance Due   Topic Date Due    Diabetic retinal exam  Never done    Pneumococcal 0-64 years Vaccine (2 - PCV) 12/08/2016    COVID-19 Vaccine (5 - Booster for Moderna series) 07/15/2022    Flu vaccine (1) 08/01/2022    Colorectal Cancer Screen  01/29/2023       ASSESSMENT AND PLAN:      1. Essential hypertension  Controlled   continue with amlodipine 10 mg, lisinopril 10 mg and Lopressor 50 mg twice daily  Discussed about lifestyle modification    2. Hyperlipidemia, unspecified hyperlipidemia type  Stable, continue with Lipitor 40 mg    3. Paroxysmal atrial fibrillation (HCC)  Stable, continue Lopressor and Eliquis      4. GERD: stable, c/w PPI    5. Nonunion after arthrodesis  Advised to follow up with podiatry     Flu and PNA vaccine given  Colon cancer screening referral again      1. Isaias Russ received counseling on the following healthy behaviors: nutrition and exercise    2. Reviewed prior labs and health maintenance. 3.  Discussed use, benefit, and side effects of prescribed medications. Barriers to medication compliance addressed. All patient questions answered. Pt voiced understanding. 4.  Continue current medications, diet and exercise. Completed Refills     5. Patient given educational materials - see patient instructions    6. Was a self-tracking handout given in paper form or via [employer]? Yes  If yes, see orders or list here.           Patient No lesions. Dentition in good repair. Neck:Supple, without lymphadenopathy. Thyroid normal, No bruits. Pulmonary/Chest: Clear to auscultation, without wheezes, rales, or rhonchi. No dullness to percussion. Cardiovascular: Regular rate and rhythm without murmurs, rubs, or gallops. Distant heart tones  Abdomen: Soft, non tender. Bowel sounds normal. No organomegaly  All four Extremities: No cyanosis, clubbing, edema, or effusions. Neurologic: No gross sensory or motor deficits. Skin: Warm and dry with good turgor. No signs of peripheral arterial or venous insufficiency. Rt great toe wound. Medical Decision Making -Laboratory:   I have independently reviewed/ordered the following labs:    CBC with Differential:   Recent Labs     01/01/20  0540   WBC 8.9   HGB 11.0*   HCT 34.5*        BMP:   Recent Labs     01/01/20  0540 01/02/20  0434   *  --    K 3.8  --    CL 98  --    CO2 23  --    BUN 14 19   CREATININE 1.07* 1.20*     Hepatic Function Panel: No results for input(s): PROT, LABALBU, BILIDIR, IBILI, BILITOT, ALKPHOS, ALT, AST in the last 72 hours. No results for input(s): RPR in the last 72 hours. No results for input(s): HIV in the last 72 hours. No results for input(s): BC in the last 72 hours.   Lab Results   Component Value Date    MUCUS NOT REPORTED 03/20/2015    RBC 3.69 01/01/2020    TRICHOMONAS NOT REPORTED 03/20/2015    WBC 8.9 01/01/2020    YEAST NOT REPORTED 03/20/2015    TURBIDITY CLEAR 01/16/2016     Lab Results   Component Value Date    CREATININE 1.20 01/02/2020    GLUCOSE 290 01/01/2020       Medical Decision Making-Imaging:     EXAMINATION:   MRI OF THE RIGHT HINDFOOT WITHOUT AND WITH CONTRAST, 12/31/2019 4:22 pm       TECHNIQUE:   Multiplanar multisequence MRI of the right foot was performed without and   with the administration of intravenous contrast.       COMPARISON:   Right foot radiographs 12/30/2019.       HISTORY:   ORDERING SYSTEM PROVIDED HISTORY: right foot wound voiced understanding and agreed to treatment plan. This note is created with the assistance of a speech-recognition program. While intending to generate a document that actually reflects the content of the visit, the document can still have some mistakes which may not have been identified and corrected by editing.       Electronically signed by:  Stephany Ferreira MD  2/9/2023 TECHNOLOGIST PROVIDED HISTORY:   right foot wound   What is the area of interest?->Forefoot   Is the patient pregnant?->No       The patient reported to the MRI tech that she had complaints involving the   heel and midfoot so a hindfoot study was performed.  The clinician has   requested that the study be repeated as a forefoot.       FINDINGS:   PLANTAR FASCIA: Mild chronic thickening of the central cord of the plantar   fascia.  No acute plantar fasciitis or plantar fascia tear.  Severe fatty   atrophy of the abductor digiti minimi muscle.       ACHILLES TENDON: The Achilles tendon is normal in position, morphology and   signal.  No associated bursitis.       BONE MARROW: No fracture or dislocation.  No abnormal marrow space-occupying   lesion.  No osseous erosion.       JOINT SPACES: The tibiotalar and subtalar joints are normal in appearance.    The talonavicular and calcaneocuboid joints are unremarkable.  The midfoot is   unremarkable.  No joint effusion.       SYNDESMOTIC LIGAMENTS: The anterior and posterior inferior tibiofibular   ligaments are intact.       LATERAL COLLATERAL LIGAMENT COMPLEX: The anterior talofibular ligament,   calcaneofibular ligament and posterior talofibular ligaments are without   acute abnormality.       DELTOID LIGAMENT COMPLEX:  The superficial and deep components of the deltoid   ligament complex are normal.       SINUS TARSI AND SPRING LIGAMENT: The sinus tarsi fat plug is grossly   preserved.  The spring ligament complex is intact.       MEDIAL TENDONS: The posterior tibialis, flexor digitorum longus and flexor   hallucis longus tendons are intact.       LATERAL TENDONS:  The peroneus longus and brevis tendons are intact.       ANTERIOR TENDONS: The tibialis anterior, extensor hallucis longus and   extensor digitorum longus tendons are normal in position, morphology and   signal.       TARSAL TUNNEL: There are no obstructing lesions within the tarsal tunnel.     MISCELLANEOUS: Circumferential subcutaneous edema about the ankle and along   the dorsum of the midfoot.  No soft tissue ulceration, sinus tract, or   drainable fluid collection.  No abnormal soft tissue mass.           Impression   1. No osteomyelitis or other acute osseous abnormality of the hindfoot. 2. Circumferential subcutaneous edema about the ankle and along the dorsum of   the midfoot compatible with bland edema versus cellulitis.  No deep soft   tissue ulceration or drainable fluid collection.         EXAMINATION:   MRI OF THE RIGHT FOREFOOT WITHOUT AND WITH CONTRAST, 1/2/2020 10:00 am       TECHNIQUE:   Multiplanar multisequence MRI of the right forefoot was performed without and   with the administration of intravenous contrast.       COMPARISON:   12/31/2019 hindfoot MRI       HISTORY:   ORDERING SYSTEM PROVIDED HISTORY: Wound   TECHNOLOGIST PROVIDED HISTORY:   Wound   What is the area of interest?->Forefoot   Is the patient pregnant?->No       Wound at the plantar aspect of the great toe.       FINDINGS:   There is mild subcutaneous edema in the dorsal and plantar forefoot.  Focal   confluent soft tissue edema measuring approximately 1.9 x 0.8 x 2.1 cm is   seen in the soft tissues at the plantar aspect of the proximal great toe,   superficial to the flexor tendon, adjacent to the proximal phalanx.  There is   appears to be intervening fat on the T1 weighted sequences.  No definite   organized fluid collection identified in the soft tissues.  No abnormal   enhancement.  There are a couple low signal foci within the plantar soft   tissues of the great toe, somewhat linear appearance, suggesting a vascular   structure, but possibility of soft tissue gas is not excluded. Yasir Harrier is a   small effusion at the 1st MTP joint.  No marrow signal abnormality.       There is a small amount of fluid in the 1st and 3rd intermetatarsal bursa.    No abnormal intermetatarsal enhancing mass.       There is mild Staff, Discharge planner  · Infection Control and Prevention measures reviewed  · All prior entries were reviewed  · Administer medications as ordered  · Prognosis: Fair  · Discharge planning reviewed  · Follow up as outpatient. Thank you for allowing us to participate in the care of this patient. Please call with questions.     Rocky Olvera MD  Pager: [phone] - Office: [phone]

## 2023-02-09 NOTE — PATIENT INSTRUCTIONS
Medications e-scribe to pharmacy of pt's choice.       Referral to GI for colonoscopy procedure was placed, summary of care printed and faxed to office, phone numbers given to pt, they will contact office for an appt

## 2023-02-13 ENCOUNTER — OFFICE VISIT (OUTPATIENT)
Dept: PODIATRY | Age: 63
End: 2023-02-13
Payer: MEDICAID

## 2023-02-13 VITALS
HEIGHT: 74 IN | DIASTOLIC BLOOD PRESSURE: 76 MMHG | HEART RATE: 62 BPM | WEIGHT: 225 LBS | SYSTOLIC BLOOD PRESSURE: 140 MMHG | BODY MASS INDEX: 28.88 KG/M2

## 2023-02-13 DIAGNOSIS — M96.0 NONUNION AFTER ARTHRODESIS: ICD-10-CM

## 2023-02-13 DIAGNOSIS — L84 CALLUS OF FOOT: ICD-10-CM

## 2023-02-13 DIAGNOSIS — M79.672 LEFT FOOT PAIN: Primary | ICD-10-CM

## 2023-02-13 PROCEDURE — 4004F PT TOBACCO SCREEN RCVD TLK: CPT

## 2023-02-13 PROCEDURE — G8427 DOCREV CUR MEDS BY ELIG CLIN: HCPCS

## 2023-02-13 PROCEDURE — 99213 OFFICE O/P EST LOW 20 MIN: CPT

## 2023-02-13 PROCEDURE — 11056 PARNG/CUTG B9 HYPRKR LES 2-4: CPT

## 2023-02-13 PROCEDURE — 3074F SYST BP LT 130 MM HG: CPT

## 2023-02-13 PROCEDURE — 3017F COLORECTAL CA SCREEN DOC REV: CPT

## 2023-02-13 PROCEDURE — 3078F DIAST BP <80 MM HG: CPT

## 2023-02-13 PROCEDURE — G8417 CALC BMI ABV UP PARAM F/U: HCPCS

## 2023-02-13 PROCEDURE — G8482 FLU IMMUNIZE ORDER/ADMIN: HCPCS

## 2023-02-13 NOTE — PROGRESS NOTES
[facility]  [address] [place] [address] [address], [address]  Tel: [phone]   Fax: [phone]    Subjective     S/p Procedures DOS: 6/27/2022:   1st Metatarsophalanjeal joint arthrodesis, left foot. Removal of hardware 2nd metatarsal, left foot. Revision hammertoe correction 2nd digit left foot. Removal of hardware 3rd metatarsal left foot. Revision hammertoe correction 3rd digit left foot. Flexor tenotomy 4th digit left foot. Interval History, 02/13/23  This 57 y.o. male presents for increased pain to the left foot. Patient is s/p left foot surgery on 06/27/23. He reports he has started using bone stimulator and reports some improvement in pain, however continues to experience stiffness and pain to left foot. He states that he has been walking in normal shoes and has stopped wearing his CAM boot. Patient is wearing normal tennis shoes. Patient also states that his calluses bilateral plantar foot are painful. He has not been icing or elevating as instructed. Denies any constitutional symptoms. Primary care physician is Milly Melara MD.    ROS:    Constitutional: Denies nausea, vomiting, fever, chills. Neurologic: Denies numbness, tingling, and burning in the feet. Vascular: Denies symptoms of lower extremity claudication. Skin: Denies open wounds. Otherwise negative except as noted in the HPI. Objective     Vitals:    02/13/23 1247   BP: (!) 140/76   Pulse: 62       Lab Results   Component Value Date    LABA1C 5.7 05/16/2022       Physical Exam:  General:  Alert and oriented x3. In no acute distress. Problem focused examination to the left lower extremity:  Left foot. Cicatrices noted to left foot. Mild edema surrounding surgical site. No erythema present. No drainage. No lymphangitis. No surrounding cellulitis. No signs of gross infection.   Callus of met head to the second and third toes plantarly, bilateral.    Limited range of motion to first MTPJ with mild crepitus noted, consistent with nonunion unionized bone. Mild pain with palpation to the first metatarsal head and base the proximal phalanx. Pain on palpation to lesser digits left foot. No prominent hardware noted. Patient has no pain to palpation of calf. The calf is soft, supple and nontender. Negative Tad's test.  Satisfactory alignment is noted. Not able to appreciate tenting of skin from hardware. Pedal pulses are palpable. Capillary refill time is less than three seconds to all digits. Sensations are intact to light touch. Imaging:   CT scan left foot 10/19/2022:  No bony fusion at the first MTPJ arthrodesis site. Assessment   Yoel Aly is a 57 y.o. male with     Diagnosis Orders   1. Post-operative state  XR FOOT LEFT (MIN 3 VIEWS)    Vitamin D 25 Hydroxy      2. Left foot pain  XR FOOT LEFT (MIN 3 VIEWS)    Vitamin D 25 Hydroxy      3. Nonunion after arthrodesis        4. Callus of foot                 Plan   Patient examined and evaluated  The patient was educated on clinical examination findings, postoperative prognosis and protocol. All questions were answered to patient's apparent satisfaction. Conservative options such as orthotics, bone stimulator, wearing of a cast or a removable boot were discussed. Discussed with patient surgical options as well for nonunion. Patient elects to go with conservative options at this time. Surgical intervention including revision of first MPJ fusion with bone graft discussed. Discussed use of bone stimulator for remainder of 1 month. Patient to transition back into CAM boot to left foot with orthotics inside. Callus sub 2,3 debrided right foot, using 15 blade down to the level of the dermis without incident  Weight bearing radiographs ordered left foot  Patient to RTC in 1 month after bone stimulation is complete and with x-rays.   If bony fusion is noted with assistance of bone stimulator or otherwise then no surgical intervention will be needed. Please, call the office with any questions or concerns. Patient care discussed with Dr. Ashutosh Storey.       Orders Placed This Encounter   Procedures    XR FOOT LEFT (MIN 3 VIEWS)     Standing Status:   Future     Standing Expiration Date:   2/13/2024    Vitamin D 25 Hydroxy     Standing Status:   Future     Standing Expiration Date:   2/13/2024             Marychuy Blanchard DPM   Podiatric Medicine & Surgery   2/13/2023 at 2:35 PM

## 2023-02-14 ENCOUNTER — TELEPHONE (OUTPATIENT)
Dept: GASTROENTEROLOGY | Age: 63
End: 2023-02-14

## 2023-02-15 DIAGNOSIS — I48.0 PAROXYSMAL ATRIAL FIBRILLATION (HCC): ICD-10-CM

## 2023-02-24 ENCOUNTER — HOSPITAL ENCOUNTER (OUTPATIENT)
Age: 63
Discharge: HOME | End: 2023-02-26

## 2023-02-24 ENCOUNTER — HOSPITAL ENCOUNTER (OUTPATIENT)
Age: 63
Discharge: HOME | End: 2023-02-24
Payer: MEDICAID

## 2023-02-24 ENCOUNTER — HOSPITAL ENCOUNTER (OUTPATIENT)
Dept: GENERAL RADIOLOGY | Age: 63
End: 2023-02-24
Payer: MEDICAID

## 2023-02-24 DIAGNOSIS — M79.672 LEFT FOOT PAIN: ICD-10-CM

## 2023-02-24 LAB — 25(OH)D3 SERPL-MCNC: 25.4 NG/ML

## 2023-02-24 PROCEDURE — 36415 COLL VENOUS BLD VENIPUNCTURE: CPT

## 2023-02-24 PROCEDURE — 82306 VITAMIN D 25 HYDROXY: CPT

## 2023-02-24 PROCEDURE — 73630 X-RAY EXAM OF FOOT: CPT

## 2023-03-20 ENCOUNTER — OFFICE VISIT (OUTPATIENT)
Dept: PODIATRY | Age: 63
End: 2023-03-20
Payer: MEDICAID

## 2023-03-20 VITALS
DIASTOLIC BLOOD PRESSURE: 81 MMHG | HEART RATE: 66 BPM | SYSTOLIC BLOOD PRESSURE: 142 MMHG | WEIGHT: 230 LBS | HEIGHT: 74 IN | BODY MASS INDEX: 29.52 KG/M2

## 2023-03-20 DIAGNOSIS — Z98.890 POST-OPERATIVE STATE: ICD-10-CM

## 2023-03-20 DIAGNOSIS — M96.0 NONUNION AFTER ARTHRODESIS: Primary | ICD-10-CM

## 2023-03-20 DIAGNOSIS — Z98.890 S/P BUNIONECTOMY: ICD-10-CM

## 2023-03-20 DIAGNOSIS — Z98.890 S/P HAMMER TOE CORRECTION: ICD-10-CM

## 2023-03-20 DIAGNOSIS — E11.9 TYPE 2 DIABETES MELLITUS WITHOUT COMPLICATION, WITHOUT LONG-TERM CURRENT USE OF INSULIN (HCC): ICD-10-CM

## 2023-03-20 DIAGNOSIS — Z87.39 S/P HAMMER TOE CORRECTION: ICD-10-CM

## 2023-03-20 PROCEDURE — G8482 FLU IMMUNIZE ORDER/ADMIN: HCPCS | Performed by: PODIATRIST

## 2023-03-20 PROCEDURE — G8427 DOCREV CUR MEDS BY ELIG CLIN: HCPCS | Performed by: PODIATRIST

## 2023-03-20 PROCEDURE — 3017F COLORECTAL CA SCREEN DOC REV: CPT | Performed by: PODIATRIST

## 2023-03-20 PROCEDURE — 3074F SYST BP LT 130 MM HG: CPT | Performed by: PODIATRIST

## 2023-03-20 PROCEDURE — 2022F DILAT RTA XM EVC RTNOPTHY: CPT | Performed by: PODIATRIST

## 2023-03-20 PROCEDURE — 99214 OFFICE O/P EST MOD 30 MIN: CPT | Performed by: PODIATRIST

## 2023-03-20 PROCEDURE — 3046F HEMOGLOBIN A1C LEVEL >9.0%: CPT | Performed by: PODIATRIST

## 2023-03-20 PROCEDURE — 4004F PT TOBACCO SCREEN RCVD TLK: CPT | Performed by: PODIATRIST

## 2023-03-20 PROCEDURE — G8417 CALC BMI ABV UP PARAM F/U: HCPCS | Performed by: PODIATRIST

## 2023-03-20 PROCEDURE — 3078F DIAST BP <80 MM HG: CPT | Performed by: PODIATRIST

## 2023-03-20 NOTE — PROGRESS NOTES
Patient instructed to remove shoes and socks and instructed to sit in exam chair. Current PCP is Zena Owens MD and date of last visit was 02/09/2023. Do you have a follow up visit scheduled?   Yes  If yes, the date is 05/11/2023
Samantha Daily.       Orders Placed This Encounter   Procedures    XR FOOT LEFT (MIN 3 VIEWS)     Standing Status:   Future     Standing Expiration Date:   3/20/2024     Order Specific Question:   Reason for exam:     Answer:   non union 1st MPJ. weight bearing image please             Yahaira Dumont DPM   Podiatric Medicine & Surgery   3/20/2023 at 4:47 PM

## 2023-04-24 ENCOUNTER — OFFICE VISIT (OUTPATIENT)
Dept: PODIATRY | Age: 63
End: 2023-04-24
Payer: MEDICAID

## 2023-04-24 VITALS
WEIGHT: 220 LBS | HEIGHT: 74 IN | DIASTOLIC BLOOD PRESSURE: 69 MMHG | BODY MASS INDEX: 28.23 KG/M2 | SYSTOLIC BLOOD PRESSURE: 119 MMHG | HEART RATE: 58 BPM

## 2023-04-24 DIAGNOSIS — E11.9 TYPE 2 DIABETES MELLITUS WITHOUT COMPLICATION, WITHOUT LONG-TERM CURRENT USE OF INSULIN (HCC): ICD-10-CM

## 2023-04-24 DIAGNOSIS — M96.0 NONUNION AFTER ARTHRODESIS: Primary | ICD-10-CM

## 2023-04-24 DIAGNOSIS — M21.41 PES PLANUS OF BOTH FEET: ICD-10-CM

## 2023-04-24 DIAGNOSIS — M21.42 PES PLANUS OF BOTH FEET: ICD-10-CM

## 2023-04-24 DIAGNOSIS — R73.03 PRE-DIABETES: ICD-10-CM

## 2023-04-24 DIAGNOSIS — Z98.890 S/P BUNIONECTOMY: ICD-10-CM

## 2023-04-24 DIAGNOSIS — Z98.890 POST-OPERATIVE STATE: ICD-10-CM

## 2023-04-24 PROCEDURE — 3078F DIAST BP <80 MM HG: CPT

## 2023-04-24 PROCEDURE — 4004F PT TOBACCO SCREEN RCVD TLK: CPT

## 2023-04-24 PROCEDURE — 3074F SYST BP LT 130 MM HG: CPT

## 2023-04-24 PROCEDURE — 99213 OFFICE O/P EST LOW 20 MIN: CPT

## 2023-04-24 PROCEDURE — 3017F COLORECTAL CA SCREEN DOC REV: CPT

## 2023-04-24 PROCEDURE — 2022F DILAT RTA XM EVC RTNOPTHY: CPT

## 2023-04-24 PROCEDURE — G8427 DOCREV CUR MEDS BY ELIG CLIN: HCPCS

## 2023-04-24 PROCEDURE — G8417 CALC BMI ABV UP PARAM F/U: HCPCS

## 2023-04-24 PROCEDURE — 3046F HEMOGLOBIN A1C LEVEL >9.0%: CPT

## 2023-04-24 RX ORDER — ERGOCALCIFEROL 1.25 MG/1
50000 CAPSULE ORAL WEEKLY
Qty: 6 CAPSULE | Refills: 0 | Status: SHIPPED | OUTPATIENT
Start: 2023-04-24

## 2023-05-08 NOTE — TELEPHONE ENCOUNTER
Writer left msg on pt's vm asking pt to call back & sched colonoscopy. Quality 130: Documentation Of Current Medications In The Medical Record: Current Medications Documented Detail Level: Detailed Quality 431: Preventive Care And Screening: Unhealthy Alcohol Use - Screening: Patient not identified as an unhealthy alcohol user when screened for unhealthy alcohol use using a systematic screening method Quality 110: Preventive Care And Screening: Influenza Immunization: Influenza Immunization Administered during Influenza season Quality 226: Preventive Care And Screening: Tobacco Use: Screening And Cessation Intervention: Patient screened for tobacco use and is an ex/non-smoker

## 2023-05-11 ENCOUNTER — HOSPITAL ENCOUNTER (OUTPATIENT)
Age: 63
Discharge: HOME | End: 2023-05-13
Payer: MEDICAID

## 2023-05-11 ENCOUNTER — HOSPITAL ENCOUNTER (OUTPATIENT)
Dept: GENERAL RADIOLOGY | Age: 63
Discharge: HOME | End: 2023-05-13
Payer: MEDICAID

## 2023-05-11 DIAGNOSIS — M96.0 NONUNION AFTER ARTHRODESIS: ICD-10-CM

## 2023-05-11 PROCEDURE — 73630 X-RAY EXAM OF FOOT: CPT

## 2023-05-25 ENCOUNTER — HOSPITAL ENCOUNTER (OUTPATIENT)
Age: 63
Setting detail: SPECIMEN
Discharge: HOME | End: 2023-05-25

## 2023-05-25 ENCOUNTER — OFFICE VISIT (OUTPATIENT)
Dept: INTERNAL MEDICINE | Age: 63
End: 2023-05-25
Payer: MEDICAID

## 2023-05-25 VITALS
HEIGHT: 74 IN | TEMPERATURE: 97.2 F | WEIGHT: 220 LBS | HEART RATE: 60 BPM | DIASTOLIC BLOOD PRESSURE: 83 MMHG | OXYGEN SATURATION: 99 % | SYSTOLIC BLOOD PRESSURE: 139 MMHG | BODY MASS INDEX: 28.23 KG/M2

## 2023-05-25 DIAGNOSIS — K21.9 GASTROESOPHAGEAL REFLUX DISEASE, UNSPECIFIED WHETHER ESOPHAGITIS PRESENT: ICD-10-CM

## 2023-05-25 DIAGNOSIS — R31.9 HEMATURIA, UNSPECIFIED TYPE: ICD-10-CM

## 2023-05-25 DIAGNOSIS — E78.5 HYPERLIPIDEMIA, UNSPECIFIED HYPERLIPIDEMIA TYPE: ICD-10-CM

## 2023-05-25 DIAGNOSIS — E55.9 VITAMIN D DEFICIENCY: ICD-10-CM

## 2023-05-25 DIAGNOSIS — E78.5 HYPERLIPIDEMIA, UNSPECIFIED HYPERLIPIDEMIA TYPE: Primary | ICD-10-CM

## 2023-05-25 DIAGNOSIS — I10 ESSENTIAL HYPERTENSION: ICD-10-CM

## 2023-05-25 DIAGNOSIS — I48.0 PAROXYSMAL ATRIAL FIBRILLATION (HCC): ICD-10-CM

## 2023-05-25 LAB
ANION GAP SERPL CALCULATED.3IONS-SCNC: 11 MMOL/L (ref 9–17)
BILIRUB UR QL STRIP: NEGATIVE
BUN SERPL-MCNC: 11 MG/DL (ref 8–23)
CALCIUM SERPL-MCNC: 9.6 MG/DL (ref 8.6–10.4)
CHLORIDE SERPL-SCNC: 107 MMOL/L (ref 98–107)
CHOLEST SERPL-MCNC: 108 MG/DL
CHOLESTEROL/HDL RATIO: 2.2
CLARITY UR: CLEAR
CO2 SERPL-SCNC: 22 MMOL/L (ref 20–31)
COLOR UR: YELLOW
COMMENT UA: NORMAL
CREAT SERPL-MCNC: 1.04 MG/DL (ref 0.7–1.2)
GFR SERPL CREATININE-BSD FRML MDRD: >60 ML/MIN/1.73M2
GLUCOSE SERPL-MCNC: 80 MG/DL (ref 70–99)
GLUCOSE UR STRIP-MCNC: NEGATIVE MG/DL
HDLC SERPL-MCNC: 50 MG/DL
HGB UR QL STRIP.AUTO: NEGATIVE
KETONES UR STRIP-MCNC: NEGATIVE MG/DL
LDLC SERPL CALC-MCNC: 42 MG/DL (ref 0–130)
LEUKOCYTE ESTERASE UR QL STRIP: NEGATIVE
NITRITE UR QL STRIP: NEGATIVE
PH UR STRIP: 5.5 [PH] (ref 5–8)
POTASSIUM SERPL-SCNC: 4.1 MMOL/L (ref 3.7–5.3)
PROT UR STRIP-MCNC: NEGATIVE MG/DL
SODIUM SERPL-SCNC: 140 MMOL/L (ref 135–144)
SP GR UR STRIP: 1.01 (ref 1–1.03)
TRIGL SERPL-MCNC: 78 MG/DL
UROBILINOGEN UR STRIP-ACNC: NORMAL

## 2023-05-25 PROCEDURE — 99211 OFF/OP EST MAY X REQ PHY/QHP: CPT | Performed by: STUDENT IN AN ORGANIZED HEALTH CARE EDUCATION/TRAINING PROGRAM

## 2023-05-25 RX ORDER — MELATONIN
1000 DAILY
Qty: 30 TABLET | Refills: 0 | Status: SHIPPED | OUTPATIENT
Start: 2023-05-25

## 2023-05-25 ASSESSMENT — ENCOUNTER SYMPTOMS
ABDOMINAL PAIN: 0
SORE THROAT: 0
SHORTNESS OF BREATH: 0
CHEST TIGHTNESS: 0
VOMITING: 0
BACK PAIN: 0
RHINORRHEA: 0
WHEEZING: 0
DIARRHEA: 0
NAUSEA: 0
CONSTIPATION: 0
TROUBLE SWALLOWING: 0
COUGH: 0

## 2023-05-25 NOTE — PROGRESS NOTES
HPI:  Santos Badillo is a.55 y.o. male coming into clinic regarding for follow up     #HTN: controlled  Is on amlodipine 10 mg, lisinopril 10 mg and lopressor 50 mg  BID  Denies any chest pain, shortness of breath, palpitation swelling of lower leg    #Afib: controlled  Is on Lopresor and eliquis    #Nonunion after arthrodesis:   Per patient he is able to alk 1 mile and foot pain and swelling is same its not getting worse  Follows with podiatry  Is using bone stimulator daily  Has appointment on 6/19/23  Doesn't want to have surgery again    Is still smoking 1 cigarette DAILY    He brought labs result done at [facility]  Creatinine was 1.31 and UA positive for +1 blood      Scheduled for colonoscopy next month. Advised him to hold eliquis 1  day and on the day of surgery    Chief Complaint   Patient presents with    Hypertension       History:  Past Medical History:   Diagnosis Date    Alcoholic ([facility] [address] 05/38/4527    Cocaine dependence ([facility] [address] 06/23/2021    GERD (gastroesophageal reflux disease)     Hallux abducto valgus     Hammer toe of left foot     History of atrial fibrillation     has followed with Dr. Ceron [facility] last visit 6/2021. On Eliquis    History of BPH     History of echocardiogram 2019    EF 62%    Hypertension     Dr. Irish Hardy    Osteoarthritis     Prediabetes     border line.  not on meds    Rheumatoid arthritis(714.0)     newly diagnosed    Wears glasses     Wellness examination 05/2022    Dr. Leola Nuñez     Past Surgical History:   Procedure Laterality Date    COLONOSCOPY  october 2012    FOOT SURGERY      Left foot surgery HDS 2-4 and bunion     FOOT SURGERY Left 06/27/2022    1ST MPJ FUSION, REVISION HAMMER TOE CORRECTION 2ND, 3RD AND 4TH, REMOVAL HARDWARE 3RD AND 4TH METATARSAL     HAMMER TOE SURGERY Left 6/27/2022    1ST MPJ FUSION, REVISION HAMMER TOE CORRECTION 2ND, 3RD AND 4TH, REMOVAL HARDWARE 3RD AND 4TH METATARSAL performed by Luis Balderas

## 2023-06-05 ENCOUNTER — ANESTHESIA EVENT (OUTPATIENT)
Dept: OPERATING ROOM | Age: 63
End: 2023-06-05
Payer: MEDICAID

## 2023-06-05 ENCOUNTER — ANESTHESIA (OUTPATIENT)
Dept: OPERATING ROOM | Age: 63
End: 2023-06-05
Payer: MEDICAID

## 2023-06-05 ENCOUNTER — CLINICAL DOCUMENTATION (OUTPATIENT)
Dept: GASTROENTEROLOGY | Age: 63
End: 2023-06-05

## 2023-06-05 ENCOUNTER — HOSPITAL ENCOUNTER (OUTPATIENT)
Age: 63
Setting detail: OUTPATIENT SURGERY
Discharge: HOME | End: 2023-06-05
Attending: INTERNAL MEDICINE | Admitting: INTERNAL MEDICINE
Payer: MEDICAID

## 2023-06-05 VITALS
SYSTOLIC BLOOD PRESSURE: 120 MMHG | DIASTOLIC BLOOD PRESSURE: 80 MMHG | RESPIRATION RATE: 18 BRPM | HEART RATE: 52 BPM | OXYGEN SATURATION: 88 % | WEIGHT: 220 LBS | TEMPERATURE: 96.8 F | BODY MASS INDEX: 28.25 KG/M2

## 2023-06-05 PROBLEM — K64.9 HEMORRHOIDS: Status: ACTIVE | Noted: 2023-06-05

## 2023-06-05 PROCEDURE — 3609027000 HC COLONOSCOPY: Performed by: INTERNAL MEDICINE

## 2023-06-05 PROCEDURE — 6360000002 HC RX W HCPCS: Performed by: NURSE ANESTHETIST, CERTIFIED REGISTERED

## 2023-06-05 PROCEDURE — 7100000011 HC PHASE II RECOVERY - ADDTL 15 MIN: Performed by: INTERNAL MEDICINE

## 2023-06-05 PROCEDURE — 45378 DIAGNOSTIC COLONOSCOPY: CPT | Performed by: INTERNAL MEDICINE

## 2023-06-05 PROCEDURE — 3700000001 HC ADD 15 MINUTES (ANESTHESIA): Performed by: INTERNAL MEDICINE

## 2023-06-05 PROCEDURE — 2580000003 HC RX 258: Performed by: NURSE ANESTHETIST, CERTIFIED REGISTERED

## 2023-06-05 PROCEDURE — 2500000003 HC RX 250 WO HCPCS: Performed by: NURSE ANESTHETIST, CERTIFIED REGISTERED

## 2023-06-05 PROCEDURE — 3700000000 HC ANESTHESIA ATTENDED CARE: Performed by: INTERNAL MEDICINE

## 2023-06-05 PROCEDURE — 7100000010 HC PHASE II RECOVERY - FIRST 15 MIN: Performed by: INTERNAL MEDICINE

## 2023-06-05 RX ORDER — SODIUM CHLORIDE, SODIUM LACTATE, POTASSIUM CHLORIDE, CALCIUM CHLORIDE 600; 310; 30; 20 MG/100ML; MG/100ML; MG/100ML; MG/100ML
INJECTION, SOLUTION INTRAVENOUS CONTINUOUS PRN
Status: DISCONTINUED | OUTPATIENT
Start: 2023-06-05 | End: 2023-06-05 | Stop reason: SDUPTHER

## 2023-06-05 RX ORDER — PROPOFOL 10 MG/ML
INJECTION, EMULSION INTRAVENOUS PRN
Status: DISCONTINUED | OUTPATIENT
Start: 2023-06-05 | End: 2023-06-05 | Stop reason: SDUPTHER

## 2023-06-05 RX ORDER — LIDOCAINE HYDROCHLORIDE 10 MG/ML
INJECTION, SOLUTION EPIDURAL; INFILTRATION; INTRACAUDAL; PERINEURAL PRN
Status: DISCONTINUED | OUTPATIENT
Start: 2023-06-05 | End: 2023-06-05 | Stop reason: SDUPTHER

## 2023-06-05 RX ADMIN — PROPOFOL 100 MCG/KG/MIN: 10 INJECTION, EMULSION INTRAVENOUS at 08:53

## 2023-06-05 RX ADMIN — LIDOCAINE HYDROCHLORIDE 25 MG: 10 INJECTION, SOLUTION EPIDURAL; INFILTRATION; INTRACAUDAL; PERINEURAL at 08:52

## 2023-06-05 RX ADMIN — SODIUM CHLORIDE, POTASSIUM CHLORIDE, SODIUM LACTATE AND CALCIUM CHLORIDE: 600; 310; 30; 20 INJECTION, SOLUTION INTRAVENOUS at 08:50

## 2023-06-05 RX ADMIN — PROPOFOL 100 MG: 10 INJECTION, EMULSION INTRAVENOUS at 08:52

## 2023-06-05 ASSESSMENT — LIFESTYLE VARIABLES: SMOKING_STATUS: 1

## 2023-06-05 ASSESSMENT — PAIN SCALES - GENERAL: PAINLEVEL_OUTOF10: 0

## 2023-06-05 ASSESSMENT — PAIN - FUNCTIONAL ASSESSMENT: PAIN_FUNCTIONAL_ASSESSMENT: 0-10

## 2023-06-05 NOTE — DISCHARGE INSTRUCTIONS
[facility]    POST-ENDOSCOPY INSTRUCTIONS:    1. ACTIVITY   No driving, operating machinery, or making important decisions for 24 hours. Resume normal activity after 24 hours. You may return to work after 24 hours. 2. DIET     ____ (EGD/ERCP):  Do not eat or drink for one hour after your exam.  You may then   try a sip of water, and if you are able to swallow as usual you may advance to a   regular diet. ____ (Colonscopy/Flex Sig):  Resume your usual diet unless specified below. ____ Diet Modification:***    3. MEDICATIONS (Do not consume alcohol, tranquilizers, or sleeping medications for 24           hours unless advised by your physician)       _____ Resume your usual medications    4. PHYSICIAN FOLLOW-UP        ____ Please call the office for an appointment/further instructions. ***        ____ See your family physician. 5. ADDITIONAL INSTRUCTIONS      ***    6. NORMAL CHANGES YOU MAY EXPERIENCE AFTER ENDOSCOPY:          EGD/ERCP     COLONOSCOPY      Sore throat after EGD/ERCP   Passing of gas for several hours after      A bloated feeling and belching from  Some mild abdominal cramping   air in stomach    If a biopsy/polypectomy was done, you      If a biopsy was done, you may spit   may see some spotting of blood   up some blood tinged mucous  You may feel fatigued for the next 24-48         hours due to the prep and sedation    7. CALL YOUR PHYSICIAN IF YOU EXPERIENCE ANY OF THE FOLLOWING:      A.  Passing blood rectally or vomiting blood (color may be red or black)      B. Severe abdominal pain or tenderness (that is not relieved by passing air)      C.   Fever, chills, or excessive sweating      D.  Persistent nausea or vomiting      E.  Redness or swelling at the IV site    If you have additional questions, PLEASE call your doctor @ _______________________ or the [address] [place], [place] [address]trudy [place] office at [phone]

## 2023-06-05 NOTE — ANESTHESIA PRE PROCEDURE
normal                    Neuro/Psych:   (+) psychiatric history:depression/anxiety    (-) seizures and CVA            ROS comment: etoh,cocaine GI/Hepatic/Renal:   (+) GERD: no interval change,          ROS comment: BPH. Endo/Other:    (+) : arthritis: OA and rheumatoid. , .    (-) diabetes mellitus               Abdominal:             Vascular: negative vascular ROS. Other Findings:             Anesthesia Plan      MAC     ASA 3       Induction: intravenous. Anesthetic plan and risks discussed with patient. Plan discussed with CRNA.                     Erum Young MD   6/5/2023

## 2023-06-05 NOTE — PROGRESS NOTES
Spoke to patient after procedure. Patient was called. Informed patient that he had a poor bowel prep. Will need repeat colonoscopy within 3 months with extended bowel prep regimen. He understood the findings and agreed with the plan as outlined. All questions answered. Patient was grateful phone call.

## 2023-06-05 NOTE — OP NOTE
Operative Note      Patient: Eric Soriano  YOB: 1960  MRN: [id number]    Date of Procedure: 6/5/2023    Pre-Op Diagnosis Codes:     * Positive colorectal cancer screening using Cologuard test [R19.5]    Post-Op Diagnosis: POOR PREP, limited exam, hemorrhoids       Procedure(s):  COLONOSCOPY DIAGNOSTIC    Surgeon(s):  Erwin Nolasco MD    Assistant:   First Assistant: Efren Beckham RN    Anesthesia: Monitor Anesthesia Care    Estimated Blood Loss (mL): Minimal    Complications: None    Specimens:   * No specimens in log *    Implants:  * No implants in log *      Drains: * No LDAs found *                  [facility] ENDOSCOPY     COLONOSCOPY    PROCEDURE DATE: 06/05/23    REFERRING PHYSICIAN: No ref. provider found     PRIMARY CARE PROVIDER: Mario Conner MD    ATTENDING PHYSICIAN: Erwin Nolasco MD     HISTORY: Mr. Eric Soriano is a 57 y.o. male who presents to the [facility] endoscopy unit for colonoscopy. The patient's clinical history is remarkable for smoker, A-fib, on Eliquis, hypertension, GERD. He is currently medically stable and appropriate for the planned procedure. PREOPERATIVE DIAGNOSIS: Positive Cologuard. PROCEDURES:   Transanal Colonoscopy --diagnostic. POSTPROCEDURE DIAGNOSIS:    Poor prep-thick green/brown liquid stool coating covering all segments of the right colon including cecum. Limited exam.    1-no obvious large lesions identified. Limited exam given prep only  2-moderate external mass, small internal nodes. MEDICATIONS:     MAC per anesthesia     EBL <10cc    INSTRUMENT: Olympus CF-H190 AL Pediatric flexible Colonoscope. PREPARATION: The nature and character of the procedure as well as risks, benefits, and alternatives were discussed with the patient and informed consent was obtained.  Complications were said to include, but were not limited to: medication allergy, medication reaction, cardiovascular and respiratory problems, bleeding, perforation, infection,

## 2023-06-05 NOTE — ANESTHESIA POSTPROCEDURE EVALUATION
Department of Anesthesiology  Postprocedure Note    Patient: Jones Zeng  MRN: [id number]  YOB: 1960  Date of evaluation: 6/5/2023      Procedure Summary     Date: 06/05/23 Room / Location: [address]    Anesthesia Start: 5940 Anesthesia Stop: 9617    Procedure: COLONOSCOPY DIAGNOSTIC Diagnosis:       Positive colorectal cancer screening using Cologuard test      (POSITIVE COLOGUARD)    Surgeons: Maximo Farrell MD Responsible Provider: Stephanie Yap MD    Anesthesia Type: MAC ASA Status: 3          Anesthesia Type: No value filed.     Patricia Phase I: Patricia Score: 10    Patricia Phase II: Patricia Score: 10      Anesthesia Post Evaluation    Patient location during evaluation: PACU  Level of consciousness: awake  Pain score: 0  Cardiovascular status: hemodynamically stable  Respiratory status: room air

## 2023-06-05 NOTE — H&P
skin   HEAD:  Normocephalic, atraumatic   EYES: PERRL. EOMs intact. EARS:  Equal bilaterally, no edema or thickening, skin is intact without lumps or lesions. No discharge. Hearing grossly WNL. NOSE:  Nares patent. No rhinorrhea   MOUTH/THROAT:  Mucous membranes moist, tongue is pink, uvula midline, teeth appear to be intact  NECK:Full ROM  LUNGS: Respirations even and non-labored. Clear to auscultation bilaterally, no wheezes, rales, or rhonchi. CARDIOVASCULAR: Regular rate and rhythm, no murmurs/rubs/gallops   ABDOMEN: soft, non-tender, non-distended, bowel sounds active x 4   EXTREMITIES: No edema bilateral lower extremities. No varicosities bilateral lower extremities. NEUROLOGIC: CN II-XII are grossly intact. Gait not assessed.   IMPRESSIONS:   Positive colorectal cancer screening using cologuard test    PLANS:   COLONOSCOPY DIAGNOSTIC    AUDI PYLE CNP   Electronically signed 6/5/2023 at 8:07 AM

## 2023-06-19 ENCOUNTER — OFFICE VISIT (OUTPATIENT)
Dept: PODIATRY | Age: 63
End: 2023-06-19
Payer: MEDICAID

## 2023-06-19 VITALS
BODY MASS INDEX: 28.23 KG/M2 | WEIGHT: 220 LBS | HEART RATE: 65 BPM | HEIGHT: 74 IN | SYSTOLIC BLOOD PRESSURE: 144 MMHG | DIASTOLIC BLOOD PRESSURE: 91 MMHG

## 2023-06-19 DIAGNOSIS — M96.0 NONUNION AFTER ARTHRODESIS: Primary | ICD-10-CM

## 2023-06-19 DIAGNOSIS — M20.22 HALLUX RIGIDUS OF LEFT FOOT: ICD-10-CM

## 2023-06-19 PROCEDURE — 99213 OFFICE O/P EST LOW 20 MIN: CPT

## 2023-06-19 PROCEDURE — G8427 DOCREV CUR MEDS BY ELIG CLIN: HCPCS

## 2023-06-19 PROCEDURE — 4004F PT TOBACCO SCREEN RCVD TLK: CPT

## 2023-06-19 PROCEDURE — 99214 OFFICE O/P EST MOD 30 MIN: CPT

## 2023-06-19 PROCEDURE — G8417 CALC BMI ABV UP PARAM F/U: HCPCS

## 2023-06-19 PROCEDURE — 3074F SYST BP LT 130 MM HG: CPT

## 2023-06-19 PROCEDURE — 3017F COLORECTAL CA SCREEN DOC REV: CPT

## 2023-06-19 PROCEDURE — 3078F DIAST BP <80 MM HG: CPT

## 2023-06-26 ENCOUNTER — HOSPITAL ENCOUNTER (OUTPATIENT)
Age: 63
Discharge: HOME | End: 2023-06-28
Payer: MEDICAID

## 2023-06-26 ENCOUNTER — HOSPITAL ENCOUNTER (OUTPATIENT)
Dept: GENERAL RADIOLOGY | Age: 63
Discharge: HOME | End: 2023-06-28
Payer: MEDICAID

## 2023-06-26 DIAGNOSIS — M96.0 NONUNION AFTER ARTHRODESIS: ICD-10-CM

## 2023-06-26 PROCEDURE — 73630 X-RAY EXAM OF FOOT: CPT

## 2023-07-19 DIAGNOSIS — I48.0 PAROXYSMAL ATRIAL FIBRILLATION (HCC): ICD-10-CM

## 2023-07-20 NOTE — TELEPHONE ENCOUNTER
Request for eliquis      Next Visit Date:last seen 5/25/23  No future appointments. Health Maintenance   Topic Date Due    Diabetic retinal exam  Never done    COVID-19 Vaccine (5 - Booster for Moderna series) 07/15/2022    A1C test (Diabetic or Prediabetic)  05/16/2023    Diabetic foot exam  07/19/2023    Flu vaccine (1) 08/01/2023    Diabetic Alb to Cr ratio (uACR) test  10/19/2023    Depression Screen  02/09/2024    Lipids  05/25/2024    GFR test (Diabetes, CKD 3-4, OR last GFR 15-59)  05/25/2024    DTaP/Tdap/Td vaccine (2 - Td or Tdap) 09/13/2026    Colorectal Cancer Screen  06/05/2033    Shingles vaccine  Completed    Pneumococcal 0-64 years Vaccine  Completed    Hepatitis C screen  Completed    HIV screen  Completed    Hepatitis A vaccine  Aged Out    Hib vaccine  Aged Out    Meningococcal (ACWY) vaccine  Aged Out    Prostate Specific Antigen (PSA) Screening or Monitoring  Discontinued       Hemoglobin A1C (%)   Date Value   05/16/2022 5.7   09/07/2021 5.8   11/17/2020 5.8             ( goal A1C is < 7)   Microalb/Crt.  Ratio (mcg/mg creat)   Date Value   10/19/2022 Can not be calculated     LDL Cholesterol (mg/dL)   Date Value   05/25/2023 42       (goal LDL is <100)   AST (U/L)   Date Value   05/16/2022 24     ALT (U/L)   Date Value   05/16/2022 21     BUN (mg/dL)   Date Value   05/25/2023 11     BP Readings from Last 3 Encounters:   06/19/23 (!) 144/91   06/05/23 120/80   05/25/23 139/83          (goal 120/80)    All Future Testing planned in [facility]  Lab Frequency Next Occurrence   COLONOSCOPY (Diagnostic) Once 04/24/2023         Patient Active Problem List:     S/P hammer toe correction     Prediabetes     Gastroesophageal reflux disease     Essential hypertension     Smoker     A-fib (HCC)     Cataract     Primary osteoarthritis of both knees     Chronic midline low back pain without sciatica     Benign prostatic hyperplasia with incomplete bladder emptying     Hyperlipidemia     Hallux abducto

## 2023-07-25 ENCOUNTER — TELEPHONE (OUTPATIENT)
Dept: GASTROENTEROLOGY | Age: 63
End: 2023-07-25

## 2023-07-25 NOTE — TELEPHONE ENCOUNTER
----- Message from Mandi Olivas MD sent at 6/5/2023  9:18 AM EDT -----  Regarding: Poor prep, repeat colonoscopy in 3 months with 2 day prep     7/25/23:   Spoke with patient and he would prefer to wait until August to schedule repeat colonoscopy

## 2023-07-31 RX ORDER — OMEPRAZOLE 20 MG/1
20 CAPSULE, DELAYED RELEASE ORAL DAILY
Qty: 30 CAPSULE | Refills: 3 | Status: SHIPPED | OUTPATIENT
Start: 2023-07-31

## 2023-07-31 NOTE — TELEPHONE ENCOUNTER
Request for omeprazole      Next Visit Date:last seen 5/25/23  No future appointments.     Health Maintenance   Topic Date Due    Diabetic retinal exam  Never done    COVID-19 Vaccine (5 - Booster for Moderna series) 07/15/2022    A1C test (Diabetic or Prediabetic)  05/16/2023    Diabetic foot exam  07/19/2023    Flu vaccine (1) 08/01/2023    Diabetic Alb to Cr ratio (uACR) test  10/19/2023    Depression Screen  02/09/2024    Lipids  05/25/2024    GFR test (Diabetes, CKD 3-4, OR last GFR 15-59)  05/25/2024    DTaP/Tdap/Td vaccine (2 - Td or Tdap) 09/13/2026    Colorectal Cancer Screen  06/05/2033    Shingles vaccine  Completed    Pneumococcal 0-64 years Vaccine  Completed    Hepatitis C screen  Completed    HIV screen  Completed    Hepatitis A vaccine  Aged Out    Hib vaccine  Aged Out    Meningococcal (ACWY) vaccine  Aged Out    Prostate Specific Antigen (PSA) Screening or Monitoring  Discontinued       Hemoglobin A1C (%)   Date Value   05/16/2022 5.7   09/07/2021 5.8   11/17/2020 5.8             ( goal A1C is < 7)   No components found for: LABMICR  LDL Cholesterol (mg/dL)   Date Value   05/25/2023 42       (goal LDL is <100)   AST (U/L)   Date Value   05/16/2022 24     ALT (U/L)   Date Value   05/16/2022 21     BUN (mg/dL)   Date Value   05/25/2023 11     BP Readings from Last 3 Encounters:   06/19/23 (!) 144/91   06/05/23 120/80   05/25/23 139/83          (goal 120/80)    All Future Testing planned in [facility]  Lab Frequency Next Occurrence   COLONOSCOPY (Diagnostic) Once 04/24/2023         Patient Active Problem List:     S/P hammer toe correction     Prediabetes     Gastroesophageal reflux disease     Essential hypertension     Smoker     A-fib (HCC)     Cataract     Primary osteoarthritis of both knees     Chronic midline low back pain without sciatica     Benign prostatic hyperplasia with incomplete bladder emptying     Hyperlipidemia     Hallux abducto valgus, left     Pain of left foot     Hammer toe of

## 2023-08-08 DIAGNOSIS — E78.5 HYPERLIPIDEMIA, UNSPECIFIED HYPERLIPIDEMIA TYPE: ICD-10-CM

## 2023-08-08 DIAGNOSIS — I10 WELL-CONTROLLED HYPERTENSION: ICD-10-CM

## 2023-08-09 NOTE — TELEPHONE ENCOUNTER
Last visit: 5/25/23  Last Med refill:   Does patient have enough medication for 72 hours: No:     Next Visit Date:  No future appointments.     Health Maintenance   Topic Date Due    Diabetic retinal exam  Never done    COVID-19 Vaccine (5 - Booster for Moderna series) 07/15/2022    A1C test (Diabetic or Prediabetic)  05/16/2023    Flu vaccine (1) 08/01/2023    Diabetic foot exam  07/19/2023    Diabetic Alb to Cr ratio (uACR) test  10/19/2023    Depression Screen  02/09/2024    Lipids  05/25/2024    GFR test (Diabetes, CKD 3-4, OR last GFR 15-59)  05/25/2024    DTaP/Tdap/Td vaccine (2 - Td or Tdap) 09/13/2026    Colorectal Cancer Screen  06/05/2033    Shingles vaccine  Completed    Pneumococcal 0-64 years Vaccine  Completed    Hepatitis C screen  Completed    HIV screen  Completed    Hepatitis A vaccine  Aged Out    Hib vaccine  Aged Out    Meningococcal (ACWY) vaccine  Aged Out    Prostate Specific Antigen (PSA) Screening or Monitoring  Discontinued       Hemoglobin A1C (%)   Date Value   05/16/2022 5.7   09/07/2021 5.8   11/17/2020 5.8             ( goal A1C is < 7)   No components found for: LABMICR  LDL Cholesterol (mg/dL)   Date Value   05/25/2023 42   05/16/2022 45       (goal LDL is <100)   AST (U/L)   Date Value   05/16/2022 24     ALT (U/L)   Date Value   05/16/2022 21     BUN (mg/dL)   Date Value   05/25/2023 11     BP Readings from Last 3 Encounters:   06/19/23 (!) 144/91   06/05/23 120/80   05/25/23 139/83          (goal 120/80)    All Future Testing planned in [facility]  Lab Frequency Next Occurrence   COLONOSCOPY (Diagnostic) Once 04/24/2023               Patient Active Problem List:     S/P hammer toe correction     Prediabetes     Gastroesophageal reflux disease     Essential hypertension     Smoker     A-fib (HCC)     Cataract     Primary osteoarthritis of both knees     Chronic midline low back pain without sciatica     Benign prostatic hyperplasia with incomplete bladder emptying

## 2023-08-12 RX ORDER — ATORVASTATIN CALCIUM 40 MG/1
40 TABLET, FILM COATED ORAL NIGHTLY
Qty: 30 TABLET | Refills: 5 | Status: SHIPPED | OUTPATIENT
Start: 2023-08-12

## 2023-08-30 NOTE — PROGRESS NOTES
Patient instructed to remove shoes and socks and instructed to sit in exam chair. Current PCP is Ivon Young MD and date of last visit was 05/25/2023. Do you have a follow up visit scheduled?   No  If yes, the date is unknown

## 2023-09-06 ENCOUNTER — OFFICE VISIT (OUTPATIENT)
Dept: PODIATRY | Age: 63
End: 2023-09-06
Payer: MEDICAID

## 2023-09-06 VITALS
WEIGHT: 220 LBS | HEART RATE: 60 BPM | HEIGHT: 74 IN | DIASTOLIC BLOOD PRESSURE: 70 MMHG | BODY MASS INDEX: 28.23 KG/M2 | SYSTOLIC BLOOD PRESSURE: 122 MMHG

## 2023-09-06 DIAGNOSIS — M96.0 NONUNION AFTER ARTHRODESIS: ICD-10-CM

## 2023-09-06 DIAGNOSIS — Q82.8 PLANTAR KERATOSIS: ICD-10-CM

## 2023-09-06 DIAGNOSIS — M20.22 HALLUX RIGIDUS OF LEFT FOOT: Primary | ICD-10-CM

## 2023-09-06 DIAGNOSIS — M19.079 ARTHRITIS, MIDFOOT: ICD-10-CM

## 2023-09-06 DIAGNOSIS — Q66.50 RIGID PES PLANUS, UNSPECIFIED LATERALITY: ICD-10-CM

## 2023-09-06 PROCEDURE — 3078F DIAST BP <80 MM HG: CPT

## 2023-09-06 PROCEDURE — G8417 CALC BMI ABV UP PARAM F/U: HCPCS

## 2023-09-06 PROCEDURE — 11056 PARNG/CUTG B9 HYPRKR LES 2-4: CPT

## 2023-09-06 PROCEDURE — 4004F PT TOBACCO SCREEN RCVD TLK: CPT

## 2023-09-06 PROCEDURE — 3017F COLORECTAL CA SCREEN DOC REV: CPT

## 2023-09-06 PROCEDURE — 99213 OFFICE O/P EST LOW 20 MIN: CPT

## 2023-09-06 PROCEDURE — 3074F SYST BP LT 130 MM HG: CPT

## 2023-09-06 PROCEDURE — 1123F ACP DISCUSS/DSCN MKR DOCD: CPT

## 2023-09-06 PROCEDURE — G8427 DOCREV CUR MEDS BY ELIG CLIN: HCPCS

## 2023-09-06 RX ORDER — UREA 40 %
CREAM (GRAM) TOPICAL
Qty: 227 G | Refills: 1 | Status: SHIPPED | OUTPATIENT
Start: 2023-09-06

## 2023-09-06 NOTE — PATIENT INSTRUCTIONS
Please obtain Power step inserts from [employer] or [employer]. It may benefit you to obtain a bigger size and then cutting desired fit from the end of the orthotic to compensate proper shoe fit. Please follow up with [facility], call them and arrange appointment to make sure insurance covers your orthotics.

## 2023-09-06 NOTE — PROGRESS NOTES
[address] [place] [address]  Tel: [phone]   Fax: [phone]    Subjective     Interval History 9/6/2023  This 70 y.o. male presents to clinic for follow-up from left foot surgery in 2022. Patient is s/p left foot surgery on 06/27/22. Last visit in April he reports having been using bone stimulator for 112 days total and today reports some improvement in pain, however continues to experience swelling. Currently presents with skin issues to the bottom of the foot with calluses. Has no complaints with his previous surgical intervention. S/p Procedures DOS: 6/27/2022:   1st Metatarsophalanjeal joint arthrodesis, left foot. Removal of hardware 2nd metatarsal, left foot. Revision hammertoe correction 2nd digit left foot. Removal of hardware 3rd metatarsal left foot. Revision hammertoe correction 3rd digit left foot. Flexor tenotomy 4th digit left foot. HPI:  This 59 y.o. male presents for a post op visit. He had first metatarsophalangeal joint arthrodesis with correction of the 2nd/ 3rd digit; 4th digit flexor tenotomy. Patient is a 1 cig a day smoker, non diabetic. History of Afib, and HTN that is contolled. History of cocaine use in the past, not currently; further alcohol dependence in the past but not currently. Has histroy of GERD for which he recently had colonoscopy. Pain is well controlled at this time. Has been icing and elevating the extremity as instructed preoperatively and has been weightbearing to the left lower extremity. Denies any current nausea, vomiting, fever, chills, shortness of breath, chest pain or calf pain. Denies any other pedal complaints  Primary care physician is Frannie Macedo MD.    ROS:    Constitutional: Denies nausea, vomiting, fever, chills. Neurologic: Denies numbness, tingling, and burning in the feet. Vascular: Denies symptoms of lower extremity claudication. Skin: Denies open wounds.   Otherwise negative

## 2023-09-07 RX ORDER — TAMSULOSIN HYDROCHLORIDE 0.4 MG/1
0.4 CAPSULE ORAL DAILY
Qty: 30 CAPSULE | Refills: 5 | Status: SHIPPED | OUTPATIENT
Start: 2023-09-07

## 2023-09-07 NOTE — TELEPHONE ENCOUNTER
Pharmacy requesting refills for Tamsulosin 0.4mg capsules. Please review and e-scribe to pharmacy listed in chart if appropriate. Thank you. Next Visit Date: LVM to schedule  Last Visit Date: 5/25/23    No future appointments.     Health Maintenance   Topic Date Due    Diabetic retinal exam  Never done    COVID-19 Vaccine (5 - Booster for Moderna series) 07/15/2022    A1C test (Diabetic or Prediabetic)  05/16/2023    Diabetic foot exam  07/19/2023    Flu vaccine (1) 08/01/2023    AAA screen  08/30/2023    Diabetic Alb to Cr ratio (uACR) test  10/19/2023    Depression Screen  02/09/2024    Lipids  05/25/2024    GFR test (Diabetes, CKD 3-4, OR last GFR 15-59)  05/25/2024    DTaP/Tdap/Td vaccine (2 - Td or Tdap) 09/13/2026    Colorectal Cancer Screen  06/05/2033    Shingles vaccine  Completed    Pneumococcal 65+ years Vaccine  Completed    Hepatitis C screen  Completed    HIV screen  Completed    Hepatitis A vaccine  Aged Out    Hib vaccine  Aged Out    Meningococcal (ACWY) vaccine  Aged Out    Pneumococcal 0-64 years Vaccine  Discontinued    Prostate Specific Antigen (PSA) Screening or Monitoring  Discontinued       Hemoglobin A1C (%)   Date Value   05/16/2022 5.7   09/07/2021 5.8   11/17/2020 5.8             ( goal A1C is < 7)   No components found for: LABMICR  LDL Cholesterol (mg/dL)   Date Value   05/25/2023 42       (goal LDL is <100)   AST (U/L)   Date Value   05/16/2022 24     ALT (U/L)   Date Value   05/16/2022 21     BUN (mg/dL)   Date Value   05/25/2023 11     BP Readings from Last 3 Encounters:   09/06/23 122/70   06/19/23 (!) 144/91   06/05/23 120/80          (goal 120/80)    All Future Testing planned in [facility]  Lab Frequency Next Occurrence   COLONOSCOPY (Diagnostic) Once 04/24/2023         Patient Active Problem List:     S/P hammer toe correction     Prediabetes     Gastroesophageal reflux disease     Essential hypertension     Smoker     A-fib (HCC)     Cataract     Primary osteoarthritis of

## 2023-09-20 ENCOUNTER — TELEPHONE (OUTPATIENT)
Dept: INTERNAL MEDICINE | Age: 63
End: 2023-09-20

## 2023-09-20 NOTE — TELEPHONE ENCOUNTER
Pc asking if he should still be taking vitamin d , and if he should , please send new script if pt should be taking

## 2023-09-21 ENCOUNTER — OFFICE VISIT (OUTPATIENT)
Dept: INTERNAL MEDICINE | Age: 63
End: 2023-09-21
Payer: MEDICARE

## 2023-09-21 VITALS
DIASTOLIC BLOOD PRESSURE: 85 MMHG | HEART RATE: 53 BPM | HEIGHT: 74 IN | BODY MASS INDEX: 26.15 KG/M2 | SYSTOLIC BLOOD PRESSURE: 141 MMHG | OXYGEN SATURATION: 99 % | WEIGHT: 203.8 LBS | TEMPERATURE: 96.8 F

## 2023-09-21 DIAGNOSIS — Z23 FLU VACCINE NEED: ICD-10-CM

## 2023-09-21 DIAGNOSIS — J31.0 RHINITIS, UNSPECIFIED TYPE: Primary | ICD-10-CM

## 2023-09-21 DIAGNOSIS — E55.9 VITAMIN D DEFICIENCY: ICD-10-CM

## 2023-09-21 PROCEDURE — G0008 ADMIN INFLUENZA VIRUS VAC: HCPCS | Performed by: STUDENT IN AN ORGANIZED HEALTH CARE EDUCATION/TRAINING PROGRAM

## 2023-09-21 RX ORDER — MELATONIN
1000 DAILY
Qty: 30 TABLET | Refills: 0 | Status: CANCELLED | OUTPATIENT
Start: 2023-09-21

## 2023-09-21 RX ORDER — FLUTICASONE PROPIONATE 50 MCG
1 SPRAY, SUSPENSION (ML) NASAL DAILY
Qty: 16 G | Refills: 0 | Status: SHIPPED | OUTPATIENT
Start: 2023-09-21

## 2023-09-21 RX ORDER — ERGOCALCIFEROL 1.25 MG/1
50000 CAPSULE ORAL WEEKLY
Qty: 6 CAPSULE | Refills: 0 | Status: CANCELLED | OUTPATIENT
Start: 2023-09-21

## 2023-09-21 RX ORDER — CETIRIZINE HYDROCHLORIDE 10 MG/1
10 TABLET ORAL DAILY
Qty: 10 TABLET | Refills: 0 | Status: SHIPPED | OUTPATIENT
Start: 2023-09-21

## 2023-09-21 RX ORDER — MELATONIN
1000 DAILY
Qty: 30 TABLET | Refills: 0 | Status: SHIPPED | OUTPATIENT
Start: 2023-09-21

## 2023-09-21 ASSESSMENT — ENCOUNTER SYMPTOMS
RHINORRHEA: 1
SORE THROAT: 0
CHEST TIGHTNESS: 0
ABDOMINAL PAIN: 0
COUGH: 0
VOICE CHANGE: 0
SINUS PRESSURE: 0
SHORTNESS OF BREATH: 0
FACIAL SWELLING: 0
WHEEZING: 0

## 2023-09-21 ASSESSMENT — PATIENT HEALTH QUESTIONNAIRE - PHQ9
1. LITTLE INTEREST OR PLEASURE IN DOING THINGS: 0
SUM OF ALL RESPONSES TO PHQ QUESTIONS 1-9: 0
SUM OF ALL RESPONSES TO PHQ9 QUESTIONS 1 & 2: 0
2. FEELING DOWN, DEPRESSED OR HOPELESS: 0
SUM OF ALL RESPONSES TO PHQ QUESTIONS 1-9: 0

## 2023-09-21 NOTE — PROGRESS NOTES
[facility]  [facility] [address]   [address] [address]  Dept: [phone]  Dept Fax: [phone]    Office Progress/Follow Up Note  Date ofpatient's visit: 9/21/2023  Patient's Name:  Elvie Eaton YOB: 1960            Patient Care Team:  Fabby Ron MD as PCP - General (Hospitalist)  Fabby Ron MD as PCP - Empaneled Provider  ================================================================    REASON FOR VISIT/CHIEF COMPLAINT:  Cough (Productive, coughing up yellow phlegm), Congestion, and Health Maintenance (Flu vaccine )    HISTORY OF PRESENTING ILLNESS:  History was obtained from: patient. Elvie Eaton a 70 y.o. is here for a rhinorrhea. Patient reports rhinorrhea and postnasal drip that has been going on for the past 2 weeks. Denies any fever, chills, cough, shortness of breath, sneezing, any seasonal allergies. He reports that he recently moved to a new house, which feels a little harder than the previous one and believes that this might be causing him rhinorrhea. Denies any other symptoms. On examination, nasal mucosa looks congested and red with some rhinorrhea. Patient also asked for flu vaccine.         Patient Active Problem List   Diagnosis    S/P hammer toe correction    Prediabetes    Gastroesophageal reflux disease    Essential hypertension    Smoker    A-fib (HCC)    Cataract    Primary osteoarthritis of both knees    Chronic midline low back pain without sciatica    Benign prostatic hyperplasia with incomplete bladder emptying    Hyperlipidemia    Hallux abducto valgus, left    Pain of left foot    Hammer toe of left foot    Pain in toe of left foot    Pain from implanted hardware    Contracture of toe, left    Hemorrhoids       Health Maintenance Due   Topic Date Due    Diabetic retinal exam  Never done    Hepatitis B vaccine (1 of 3 - Risk 3-dose series) Never done    COVID-19 Vaccine (5 - Moderna series) 07/15/2022

## 2023-09-22 NOTE — PROGRESS NOTES
Attending Physician Statement  I have discussed the care of Nidhi Manuel, including pertinent history and exam findings with the resident. I have reviewed the key elements of all parts of the encounter with the resident. I agree with the assessment, and status of the problem list as documented. The plan and orders should include   Orders Placed This Encounter   Procedures    Influenza, FLUAD, (age 72 y+), IM, Preservative Free, 0.5 mL    and this was also documented by the resident. The medication list was reviewed with the resident and is up to date. Diagnosis Orders   1. Rhinitis, unspecified type  fluticasone (FLONASE) 50 MCG/ACT nasal spray    cetirizine (ZYRTEC) 10 MG tablet      2. Vitamin D deficiency  vitamin D3 (CHOLECALCIFEROL) 25 MCG (1000 UT) TABS tablet      3.  Flu vaccine need  Influenza, FLUAD, (age 72 y+), IM, Preservative Free, 0.5 mL           Dana Loyola MD   Attending Physician, [address] [place] [address], Internal Medicine Residency Program  [address] [facility]

## 2023-09-28 ENCOUNTER — TELEPHONE (OUTPATIENT)
Dept: INTERNAL MEDICINE | Age: 63
End: 2023-09-28

## 2023-09-28 NOTE — TELEPHONE ENCOUNTER
Haider from [facility] is a nurse calling in said they ran a home test a1c which came back as 6.9 , last test in 2022 was 5.7 , pt has a nov 23 appt     Please advise

## 2023-10-30 DIAGNOSIS — I10 WELL-CONTROLLED HYPERTENSION: ICD-10-CM

## 2023-10-30 NOTE — TELEPHONE ENCOUNTER
Gastroesophageal reflux disease     Essential hypertension     Smoker     A-fib (HCC)     Cataract     Primary osteoarthritis of both knees     Chronic midline low back pain without sciatica     Benign prostatic hyperplasia with incomplete bladder emptying     Hyperlipidemia     Hallux abducto valgus, left     Pain of left foot     Hammer toe of left foot     Pain in toe of left foot     Pain from implanted hardware     Contracture of toe, left     Hemorrhoids

## 2023-10-31 RX ORDER — AMLODIPINE BESYLATE 10 MG/1
10 TABLET ORAL DAILY
Qty: 30 TABLET | Refills: 5 | Status: SHIPPED | OUTPATIENT
Start: 2023-10-31

## 2023-10-31 RX ORDER — OMEPRAZOLE 20 MG/1
20 CAPSULE, DELAYED RELEASE ORAL DAILY
Qty: 30 CAPSULE | Refills: 3 | Status: SHIPPED | OUTPATIENT
Start: 2023-10-31

## 2023-11-06 DIAGNOSIS — I48.0 PAROXYSMAL ATRIAL FIBRILLATION (HCC): ICD-10-CM

## 2023-11-06 NOTE — TELEPHONE ENCOUNTER
.. Request for   Requested Prescriptions     Pending Prescriptions Disp Refills    metoprolol tartrate (LOPRESSOR) 50 MG tablet [Pharmacy Med Name: METOPROLOL TART 50 MG TAB** 50 Tablet] 60 tablet 5     Sig: TAKE 1 TABLET BY MOUTH 2 TIMES DAILY    . Please review and e-scribe to pharmacy listed in chart if appropriate. Thank you.       Last Visit Date: 9/21/2023  Next Visit Date: 11/29/2023    Future Appointments   Date Time Provider [address]   11/29/2023  9:40 AM Stephanie William MD [address] [facility] Maintenance   Topic Date Due    Diabetic retinal exam  Never done    Hepatitis B vaccine (1 of 3 - Risk 3-dose series) Never done    A1C test (Diabetic or Prediabetic)  05/16/2023    Diabetic foot exam  07/19/2023    AAA screen  Never done    COVID-19 Vaccine (5 - 2023-24 season) 09/01/2023    Annual Wellness Visit (AWV)  Never done    Diabetic Alb to Cr ratio (uACR) test  10/19/2023    Lipids  05/25/2024    GFR test (Diabetes, CKD 3-4, OR last GFR 15-59)  05/25/2024    Depression Screen  09/21/2024    DTaP/Tdap/Td vaccine (2 - Td or Tdap) 09/13/2026    Colorectal Cancer Screen  06/05/2033    Flu vaccine  Completed    Shingles vaccine  Completed    Pneumococcal 65+ years Vaccine  Completed    Hepatitis C screen  Completed    HIV screen  Completed    Hepatitis A vaccine  Aged Out    Hib vaccine  Aged Out    Meningococcal (ACWY) vaccine  Aged Out    Pneumococcal 0-64 years Vaccine  Discontinued    Prostate Specific Antigen (PSA) Screening or Monitoring  Discontinued       Hemoglobin A1C (%)   Date Value   05/16/2022 5.7   09/07/2021 5.8   11/17/2020 5.8             ( goal A1C is < 7)   No components found for: \"LABMICR\"  LDL Cholesterol (mg/dL)   Date Value   05/25/2023 42       (goal LDL is <100)   AST (U/L)   Date Value   05/16/2022 24     ALT (U/L)   Date Value   05/16/2022 21     BUN (mg/dL)   Date Value   05/25/2023 11     BP Readings from Last 3 Encounters:   09/21/23 (!) 141/85   09/06/23 122/70

## 2023-11-07 RX ORDER — METOPROLOL TARTRATE 50 MG/1
50 TABLET, FILM COATED ORAL 2 TIMES DAILY
Qty: 60 TABLET | Refills: 5 | Status: SHIPPED | OUTPATIENT
Start: 2023-11-07

## 2023-11-13 ENCOUNTER — TELEPHONE (OUTPATIENT)
Dept: INTERNAL MEDICINE | Age: 63
End: 2023-11-13

## 2023-11-13 NOTE — TELEPHONE ENCOUNTER
----- Message from Josephine Osullivan sent at 11/13/2023  8:21 AM EST -----  Subject: Message to Provider    QUESTIONS  Information for Provider? Jax Erazo is returning call staff left   for him to schedule a DM f/u. He is requesting to see if it can just be   added to his appt on 11/29/23.  Please f/u to advise.   ---------------------------------------------------------------------------  --------------  Mauricio Young INFO  [phone]; OK to leave message on voicemail  ---------------------------------------------------------------------------  --------------  SCRIPT ANSWERS  undefined

## 2023-11-29 ENCOUNTER — OFFICE VISIT (OUTPATIENT)
Dept: INTERNAL MEDICINE | Age: 63
End: 2023-11-29
Payer: MEDICARE

## 2023-11-29 VITALS
HEIGHT: 74 IN | DIASTOLIC BLOOD PRESSURE: 93 MMHG | HEART RATE: 68 BPM | BODY MASS INDEX: 26.69 KG/M2 | WEIGHT: 208 LBS | OXYGEN SATURATION: 99 % | TEMPERATURE: 97.9 F | SYSTOLIC BLOOD PRESSURE: 148 MMHG

## 2023-11-29 DIAGNOSIS — Z13.6 ENCOUNTER FOR ABDOMINAL AORTIC ANEURYSM (AAA) SCREENING: ICD-10-CM

## 2023-11-29 DIAGNOSIS — K02.9 DENTAL CARIES: ICD-10-CM

## 2023-11-29 DIAGNOSIS — E55.9 VITAMIN D DEFICIENCY: ICD-10-CM

## 2023-11-29 DIAGNOSIS — R73.03 PREDIABETES: ICD-10-CM

## 2023-11-29 DIAGNOSIS — I10 PRIMARY HYPERTENSION: Primary | ICD-10-CM

## 2023-11-29 LAB — HBA1C MFR BLD: 5.7 %

## 2023-11-29 PROCEDURE — G8427 DOCREV CUR MEDS BY ELIG CLIN: HCPCS | Performed by: STUDENT IN AN ORGANIZED HEALTH CARE EDUCATION/TRAINING PROGRAM

## 2023-11-29 PROCEDURE — 3017F COLORECTAL CA SCREEN DOC REV: CPT | Performed by: STUDENT IN AN ORGANIZED HEALTH CARE EDUCATION/TRAINING PROGRAM

## 2023-11-29 PROCEDURE — G8417 CALC BMI ABV UP PARAM F/U: HCPCS | Performed by: STUDENT IN AN ORGANIZED HEALTH CARE EDUCATION/TRAINING PROGRAM

## 2023-11-29 PROCEDURE — 99214 OFFICE O/P EST MOD 30 MIN: CPT | Performed by: STUDENT IN AN ORGANIZED HEALTH CARE EDUCATION/TRAINING PROGRAM

## 2023-11-29 PROCEDURE — 4004F PT TOBACCO SCREEN RCVD TLK: CPT | Performed by: STUDENT IN AN ORGANIZED HEALTH CARE EDUCATION/TRAINING PROGRAM

## 2023-11-29 PROCEDURE — G8484 FLU IMMUNIZE NO ADMIN: HCPCS | Performed by: STUDENT IN AN ORGANIZED HEALTH CARE EDUCATION/TRAINING PROGRAM

## 2023-11-29 PROCEDURE — 83036 HEMOGLOBIN GLYCOSYLATED A1C: CPT | Performed by: STUDENT IN AN ORGANIZED HEALTH CARE EDUCATION/TRAINING PROGRAM

## 2023-11-29 PROCEDURE — 1123F ACP DISCUSS/DSCN MKR DOCD: CPT | Performed by: STUDENT IN AN ORGANIZED HEALTH CARE EDUCATION/TRAINING PROGRAM

## 2023-11-29 PROCEDURE — 3080F DIAST BP >= 90 MM HG: CPT | Performed by: STUDENT IN AN ORGANIZED HEALTH CARE EDUCATION/TRAINING PROGRAM

## 2023-11-29 PROCEDURE — 3077F SYST BP >= 140 MM HG: CPT | Performed by: STUDENT IN AN ORGANIZED HEALTH CARE EDUCATION/TRAINING PROGRAM

## 2023-11-29 RX ORDER — LISINOPRIL 20 MG/1
20 TABLET ORAL DAILY
Qty: 90 TABLET | Refills: 1 | Status: SHIPPED | OUTPATIENT
Start: 2023-11-29

## 2023-11-29 RX ORDER — MELATONIN
1000 DAILY
Qty: 90 TABLET | Refills: 2 | Status: SHIPPED | OUTPATIENT
Start: 2023-11-29

## 2023-11-29 RX ORDER — TRAMADOL HYDROCHLORIDE 50 MG/1
50 TABLET ORAL EVERY 8 HOURS PRN
Qty: 15 TABLET | Refills: 0 | Status: SHIPPED | OUTPATIENT
Start: 2023-11-29 | End: 2023-12-04

## 2023-11-29 RX ORDER — LISINOPRIL 10 MG/1
10 TABLET ORAL DAILY
Qty: 30 TABLET | Refills: 5 | Status: SHIPPED | OUTPATIENT
Start: 2023-11-29 | End: 2023-11-29

## 2023-11-29 RX ORDER — LISINOPRIL 20 MG/1
20 TABLET ORAL DAILY
Qty: 90 TABLET | Refills: 1 | Status: SHIPPED | OUTPATIENT
Start: 2023-11-29 | End: 2023-11-29

## 2023-11-29 ASSESSMENT — ENCOUNTER SYMPTOMS
NAUSEA: 0
BACK PAIN: 0
VOMITING: 0
RHINORRHEA: 0
DIARRHEA: 0
ABDOMINAL PAIN: 0
CONSTIPATION: 0
SHORTNESS OF BREATH: 0
FACIAL SWELLING: 0
CHEST TIGHTNESS: 0
SORE THROAT: 0
WHEEZING: 0
TROUBLE SWALLOWING: 1
COUGH: 0

## 2023-11-29 NOTE — PROGRESS NOTES
HPI:  Jody Pathak is a.65 y.o. 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:   Follows with podiatry  Doesn't want to have surgery again    Is still smoking 1 pack that lasts for 4 days    He brought vaccine status done at [facility]      Chief Complaint   Patient presents with    Hypertension     Pt took medication today    Dental Pain     referral       History:  Past Medical History:   Diagnosis Date    Alcoholic ([address]) 59/26/8953    Cocaine dependence ([address]) 06/23/2021    GERD (gastroesophageal reflux disease)     Hallux abducto valgus     Hammer toe of left foot     History of atrial fibrillation     has followed with Dr. [facility] last visit 6/2021. On Eliquis    History of BPH     History of echocardiogram 2019    EF 62%    Hypertension     Dr. Froy Carmona    Osteoarthritis     Prediabetes     border line.  not on meds    Rheumatoid arthritis(714.0)     newly diagnosed    Wears glasses     Wellness examination 05/2022    Dr. Hickman Gave     Past Surgical History:   Procedure Laterality Date    COLONOSCOPY  10/2012    COLONOSCOPY  06/05/2023    COLONOSCOPY N/A 6/5/2023    COLONOSCOPY DIAGNOSTIC performed by Lindy Dale MD at [facility]      Left foot surgery HDS 2-4 and bunion     FOOT SURGERY Left 06/27/2022    1ST MPJ FUSION, REVISION HAMMER TOE CORRECTION 2ND, 3RD AND 4TH, REMOVAL HARDWARE 3RD AND 4TH METATARSAL     HAMMER TOE SURGERY Left

## 2023-12-04 ENCOUNTER — HOSPITAL ENCOUNTER (EMERGENCY)
Age: 63
Discharge: LEFT BEFORE DISCHARGE | End: 2023-12-04
Attending: EMERGENCY MEDICINE
Payer: MEDICARE

## 2023-12-04 VITALS
WEIGHT: 209.22 LBS | TEMPERATURE: 96.8 F | DIASTOLIC BLOOD PRESSURE: 79 MMHG | OXYGEN SATURATION: 95 % | HEART RATE: 52 BPM | RESPIRATION RATE: 18 BRPM | SYSTOLIC BLOOD PRESSURE: 142 MMHG | BODY MASS INDEX: 26.86 KG/M2

## 2023-12-04 DIAGNOSIS — K08.89 PAIN, DENTAL: Primary | ICD-10-CM

## 2023-12-04 PROCEDURE — 99281 EMR DPT VST MAYX REQ PHY/QHP: CPT

## 2023-12-04 ASSESSMENT — ENCOUNTER SYMPTOMS
SHORTNESS OF BREATH: 0
VOMITING: 0
SORE THROAT: 0
TROUBLE SWALLOWING: 0

## 2023-12-04 NOTE — ED NOTES
Pt arrived to ED via triage C/O tooth pain. States his lower right side of his mouth hurts. Isabela Friend has been going on for a month and a half. Has been taking tramadol for pain. Took dose this morning. Does not have a primary dentist.   RR even and unlabored, A+O x 4, ambulatory, call light in reach.         Darlene Amin RN  12/04/23 1910

## 2023-12-04 NOTE — ED NOTES
Pt eloped after stating he does not want pain medication and that we are unable to help him.  State she needs a dentist.     Rodrigo Karimi RN  12/04/23 [address] [address] [address] Fabian Lucas RN  12/04/23 1714

## 2023-12-08 ENCOUNTER — TRANSCRIBE ORDERS (OUTPATIENT)
Dept: ADMINISTRATIVE | Age: 63
End: 2023-12-08

## 2023-12-08 DIAGNOSIS — Z13.6 ENCOUNTER FOR ABDOMINAL AORTIC ANEURYSM SCREENING: Primary | ICD-10-CM

## 2023-12-11 ENCOUNTER — HOSPITAL ENCOUNTER (OUTPATIENT)
Dept: VASCULAR LAB | Age: 63
Discharge: HOME | End: 2023-12-13
Attending: STUDENT IN AN ORGANIZED HEALTH CARE EDUCATION/TRAINING PROGRAM
Payer: MEDICARE

## 2023-12-11 ENCOUNTER — HOSPITAL ENCOUNTER (OUTPATIENT)
Age: 63
Discharge: HOME | End: 2023-12-11
Payer: MEDICARE

## 2023-12-11 DIAGNOSIS — Z13.6 ENCOUNTER FOR ABDOMINAL AORTIC ANEURYSM (AAA) SCREENING: ICD-10-CM

## 2023-12-11 DIAGNOSIS — R73.03 PREDIABETES: ICD-10-CM

## 2023-12-11 DIAGNOSIS — I10 PRIMARY HYPERTENSION: ICD-10-CM

## 2023-12-11 DIAGNOSIS — Z13.6 ENCOUNTER FOR ABDOMINAL AORTIC ANEURYSM SCREENING: ICD-10-CM

## 2023-12-11 LAB
CREAT UR-MCNC: 147.6 MG/DL (ref 39–259)
MICROALBUMIN UR-MCNC: <12 MG/L
MICROALBUMIN/CREAT UR-RTO: NORMAL MCG/MG CREAT
VAS AORTA DIST AP: 1.8 CM
VAS AORTA DIST PSV: 115 CM/S
VAS AORTA DIST TR: 1.88 CM
VAS AORTA MID AP: 1.97 CM
VAS AORTA MID PSV: 142 CM/S
VAS AORTA MID TRANS: 2.06 CM
VAS AORTA PROX AP: 2.35 CM
VAS AORTA PROX PSV: 81.7 CM/S
VAS AORTA PROX TR: 2.32 CM
VAS LEFT COM ILIAC AP: 1.34 CM
VAS LEFT COM ILIAC PROX PSV: 165 CM/S
VAS LEFT COM ILIAC TRANS: 1.48 CM
VAS RIGHT COM ILIAC AP: 1.43 CM
VAS RIGHT COM ILIAC PROX PSV: 99.9 CM/S
VAS RIGHT COM ILIAC TRANS: 1.33 CM

## 2023-12-11 PROCEDURE — 76706 US ABDL AORTA SCREEN AAA: CPT

## 2023-12-11 PROCEDURE — 82570 ASSAY OF URINE CREATININE: CPT

## 2023-12-11 PROCEDURE — 82043 UR ALBUMIN QUANTITATIVE: CPT

## 2023-12-11 PROCEDURE — 76706 US ABDL AORTA SCREEN AAA: CPT | Performed by: SURGERY

## 2023-12-12 DIAGNOSIS — I48.0 PAROXYSMAL ATRIAL FIBRILLATION (HCC): ICD-10-CM

## 2023-12-12 NOTE — TELEPHONE ENCOUNTER
..Request for   Requested Prescriptions     Pending Prescriptions Disp Refills    apixaban (ELIQUIS) 5 MG TABS tablet [Pharmacy Med Name: ELIQUIS 5MG TAB 5 Tablet] 60 tablet 5     Sig: TAKE 1 TABLET BY MOUTH TWICE A DAY    .      Please review and e-scribe to pharmacy listed in chart if appropriate. Thank you.      Last Visit Date: 11/29/2023  Next Visit Date: 2/29/2024    Future Appointments   Date Time Provider Department Center   2/29/2024  9:40 AM Sandra Tavares MD [facility]       Health Maintenance   Topic Date Due    Diabetic retinal exam  Never done    Hepatitis B vaccine (1 of 3 - Risk 3-dose series) Never done    Respiratory Syncytial Virus (RSV) age 60 yrs+ (1 - 1-dose 60+ series) Never done    Lipids  05/25/2024    GFR test (Diabetes, CKD 3-4, OR last GFR 15-59)  05/25/2024    Depression Screen  09/21/2024    Diabetic foot exam  11/29/2024    A1C test (Diabetic or Prediabetic)  11/29/2024    Diabetic Alb to Cr ratio (uACR) test  12/11/2024    DTaP/Tdap/Td vaccine (2 - Td or Tdap) 09/13/2026    Colorectal Cancer Screen  06/05/2033    Flu vaccine  Completed    Shingles vaccine  Completed    Pneumococcal 65+ years Vaccine  Completed    COVID-19 Vaccine  Completed    AAA screen  Completed    Hepatitis C screen  Completed    HIV screen  Completed    Hepatitis A vaccine  Aged Out    Hib vaccine  Aged Out    Meningococcal (ACWY) vaccine  Aged Out    Pneumococcal 0-64 years Vaccine  Discontinued    Prostate Specific Antigen (PSA) Screening or Monitoring  Discontinued       Hemoglobin A1C (%)   Date Value   11/29/2023 5.7   05/16/2022 5.7   09/07/2021 5.8             ( goal A1C is < 7)   No components found for: \"LABMICR\"  LDL Cholesterol (mg/dL)   Date Value   05/25/2023 42       (goal LDL is <100)   AST (U/L)   Date Value   05/16/2022 24     ALT (U/L)   Date Value   05/16/2022 21     BUN (mg/dL)   Date Value   05/25/2023 11     BP Readings from Last 3 Encounters:   12/04/23 (!) 142/79   11/29/23 (!) 148/93

## 2024-01-04 DIAGNOSIS — E78.5 HYPERLIPIDEMIA, UNSPECIFIED HYPERLIPIDEMIA TYPE: ICD-10-CM

## 2024-01-04 DIAGNOSIS — I10 WELL-CONTROLLED HYPERTENSION: ICD-10-CM

## 2024-01-04 NOTE — TELEPHONE ENCOUNTER
..Request for Atorvastatin.    Please review and e-scribe to pharmacy listed in chart if appropriate. Thank you.      Last Visit Date: 12/20/2023  Next Visit Date: 2/29/2024    Future Appointments   Date Time Provider Department Center   2/29/2024  9:40 AM Sandra Tavares MD [facility]       Health Maintenance   Topic Date Due    Diabetic retinal exam  Never done    Respiratory Syncytial Virus (RSV) Pregnant or age 60 yrs+ (1 - 1-dose 60+ series) Never done    Annual Wellness Visit (Medicare Advantage)  Never done    Lipids  05/25/2024    GFR test (Diabetes, CKD 3-4, OR last GFR 15-59)  05/25/2024    Depression Screen  09/21/2024    Diabetic foot exam  11/29/2024    A1C test (Diabetic or Prediabetic)  11/29/2024    Diabetic Alb to Cr ratio (uACR) test  12/11/2024    DTaP/Tdap/Td vaccine (2 - Td or Tdap) 09/13/2026    Colorectal Cancer Screen  06/05/2033    Flu vaccine  Completed    Shingles vaccine  Completed    Pneumococcal 65+ years Vaccine  Completed    COVID-19 Vaccine  Completed    AAA screen  Completed    Hepatitis C screen  Completed    HIV screen  Completed    Hepatitis A vaccine  Aged Out    Hepatitis B vaccine  Aged Out    Hib vaccine  Aged Out    Polio vaccine  Aged Out    Meningococcal (ACWY) vaccine  Aged Out    Pneumococcal 0-64 years Vaccine  Discontinued    Prostate Specific Antigen (PSA) Screening or Monitoring  Discontinued       Hemoglobin A1C (%)   Date Value   11/29/2023 5.7   05/16/2022 5.7   09/07/2021 5.8             ( goal A1C is < 7)   No components found for: \"LABMICR\"  LDL Cholesterol (mg/dL)   Date Value   05/25/2023 42       (goal LDL is <100)   AST (U/L)   Date Value   05/16/2022 24     ALT (U/L)   Date Value   05/16/2022 21     BUN (mg/dL)   Date Value   05/25/2023 11     BP Readings from Last 3 Encounters:   12/20/23 (!) 146/82   12/04/23 (!) 142/79   11/29/23 (!) 148/93          (goal 120/80)    All Future Testing planned in [facility]  Lab Frequency Next Occurrence         Patient

## 2024-01-07 RX ORDER — ATORVASTATIN CALCIUM 40 MG/1
40 TABLET, FILM COATED ORAL NIGHTLY
Qty: 30 TABLET | Refills: 5 | Status: SHIPPED | OUTPATIENT
Start: 2024-01-07

## 2024-01-22 ENCOUNTER — OFFICE VISIT (OUTPATIENT)
Dept: PODIATRY | Age: 64
End: 2024-01-22
Payer: MEDICARE

## 2024-01-22 VITALS
HEART RATE: 58 BPM | HEIGHT: 74 IN | BODY MASS INDEX: 27.21 KG/M2 | SYSTOLIC BLOOD PRESSURE: 132 MMHG | DIASTOLIC BLOOD PRESSURE: 78 MMHG | WEIGHT: 212 LBS

## 2024-01-22 DIAGNOSIS — M96.0 NONUNION AFTER ARTHRODESIS: Primary | ICD-10-CM

## 2024-01-22 PROCEDURE — 1123F ACP DISCUSS/DSCN MKR DOCD: CPT

## 2024-01-22 PROCEDURE — 11056 PARNG/CUTG B9 HYPRKR LES 2-4: CPT

## 2024-01-22 PROCEDURE — 99213 OFFICE O/P EST LOW 20 MIN: CPT

## 2024-01-22 PROCEDURE — G8417 CALC BMI ABV UP PARAM F/U: HCPCS

## 2024-01-22 PROCEDURE — G8484 FLU IMMUNIZE NO ADMIN: HCPCS

## 2024-01-22 PROCEDURE — 3078F DIAST BP <80 MM HG: CPT

## 2024-01-22 PROCEDURE — 99213 OFFICE O/P EST LOW 20 MIN: CPT | Performed by: PODIATRIST

## 2024-01-22 PROCEDURE — 3075F SYST BP GE 130 - 139MM HG: CPT

## 2024-01-22 PROCEDURE — 3017F COLORECTAL CA SCREEN DOC REV: CPT

## 2024-01-22 PROCEDURE — 4004F PT TOBACCO SCREEN RCVD TLK: CPT

## 2024-01-22 PROCEDURE — G8427 DOCREV CUR MEDS BY ELIG CLIN: HCPCS

## 2024-01-22 NOTE — PROGRESS NOTES
[facility]  [address]   [address] [place] [address]  Tel: [phone]   Fax: [phone]    Subjective     Interval History 1/22/2024    Patient re-presents to clinic for pain due to calluses appearing on the plantar surfaces of both feet.  Patient has prior history of left foot surgery in 2022 with nonunion via arthrodesis.  Patient reports no pain to the arthrodesis site, states that has been working as intended.  Patient has pain with calluses on bilateral feet which is worse the ambulation and with performing daily chores.  Patient denies any nausea, vomiting, chills, shortness of breath.      S/p Procedures DOS: 6/27/2022:   1st Metatarsophalanjeal joint arthrodesis, left foot.  Removal of hardware 2nd metatarsal, left foot.  Revision hammertoe correction 2nd digit left foot.  Removal of hardware 3rd metatarsal left foot.  Revision hammertoe correction 3rd digit left foot.  Flexor tenotomy 4th digit left foot.  HPI:  This 61 y.o. male presents for a post op visit.  He had first metatarsophalangeal joint arthrodesis with correction of the 2nd/ 3rd digit; 4th digit flexor tenotomy. Patient is a 1 cig a day smoker, non diabetic. History of Afib, and HTN that is contolled. History of cocaine use in the past, not currently; further alcohol dependence in the past but not currently. Has histroy of GERD for which he recently had colonoscopy. Pain is well controlled at this time.  Has been icing and elevating the extremity as instructed preoperatively and has been weightbearing to the left lower extremity. Denies any current nausea, vomiting, fever, chills, shortness of breath, chest pain or calf pain. Denies any other pedal complaints  Primary care physician is Sandra Tavares MD.    ROS:    Constitutional: Denies nausea, vomiting, fever, chills.  Neurologic: Denies numbness, tingling, and burning in the feet.    Vascular: Denies symptoms of lower extremity claudication.    Skin: Denies open

## 2024-01-22 NOTE — TELEPHONE ENCOUNTER
..Request for Tamsulosin.      Please review and e-scribe to pharmacy listed in chart if appropriate. Thank you.      Last Visit Date: 12/20/2023  Next Visit Date: 2/29/2024    Future Appointments   Date Time Provider Department Center   1/22/2024  3:45 PM Mary Beth Wild DPM [facility]   2/29/2024  9:40 AM [facility]       Health Maintenance   Topic Date Due    Diabetic retinal exam  Never done    Respiratory Syncytial Virus (RSV) Pregnant or age 60 yrs+ (1 - 1-dose 60+ series) Never done    Annual Wellness Visit (Medicare Advantage)  Never done    Lipids  05/25/2024    GFR test (Diabetes, CKD 3-4, OR last GFR 15-59)  05/25/2024    Depression Screen  09/21/2024    Diabetic foot exam  11/29/2024    A1C test (Diabetic or Prediabetic)  11/29/2024    Diabetic Alb to Cr ratio (uACR) test  12/11/2024    DTaP/Tdap/Td vaccine (2 - Td or Tdap) 09/13/2026    Colorectal Cancer Screen  06/05/2033    Flu vaccine  Completed    Shingles vaccine  Completed    Pneumococcal 65+ years Vaccine  Completed    COVID-19 Vaccine  Completed    AAA screen  Completed    Hepatitis C screen  Completed    HIV screen  Completed    Hepatitis A vaccine  Aged Out    Hepatitis B vaccine  Aged Out    Hib vaccine  Aged Out    Polio vaccine  Aged Out    Meningococcal (ACWY) vaccine  Aged Out    Pneumococcal 0-64 years Vaccine  Discontinued    Prostate Specific Antigen (PSA) Screening or Monitoring  Discontinued       Hemoglobin A1C (%)   Date Value   11/29/2023 5.7   05/16/2022 5.7   09/07/2021 5.8             ( goal A1C is < 7)   No components found for: \"LABMICR\"  LDL Cholesterol (mg/dL)   Date Value   05/25/2023 42       (goal LDL is <100)   AST (U/L)   Date Value   05/16/2022 24     ALT (U/L)   Date Value   05/16/2022 21     BUN (mg/dL)   Date Value   05/25/2023 11     BP Readings from Last 3 Encounters:   12/20/23 (!) 146/82   12/04/23 (!) 142/79   11/29/23 (!) 148/93          (goal 120/80)    All Future Testing

## 2024-01-22 NOTE — PROGRESS NOTES
Patient instructed to remove shoes and socks and instructed to sit in exam chair.  Current PCP is Sandra Tavares MD and date of last visit was 12/20/23.   Do you have a follow up visit scheduled?  Yes  If yes, the date is 02/29/2024

## 2024-01-23 RX ORDER — TAMSULOSIN HYDROCHLORIDE 0.4 MG/1
0.4 CAPSULE ORAL DAILY
Qty: 30 CAPSULE | Refills: 5 | Status: SHIPPED | OUTPATIENT
Start: 2024-01-23

## 2024-01-29 ENCOUNTER — HOSPITAL ENCOUNTER (OUTPATIENT)
Age: 64
Discharge: HOME | End: 2024-01-31
Payer: MEDICARE

## 2024-01-29 ENCOUNTER — HOSPITAL ENCOUNTER (OUTPATIENT)
Dept: GENERAL RADIOLOGY | Age: 64
Discharge: HOME | End: 2024-01-31
Payer: MEDICARE

## 2024-01-29 DIAGNOSIS — M96.0 NONUNION AFTER ARTHRODESIS: ICD-10-CM

## 2024-01-29 PROCEDURE — 73630 X-RAY EXAM OF FOOT: CPT

## 2024-01-29 NOTE — TELEPHONE ENCOUNTER
A Refill Has Been Requested for Jose Guevara    Medication Requested  Omeprazole      Last Visit Date (If Applicable)  11/29/2023    Next Visit Date (If Applicable)  2/29/2024

## 2024-01-30 RX ORDER — OMEPRAZOLE 20 MG/1
20 CAPSULE, DELAYED RELEASE ORAL DAILY
Qty: 30 CAPSULE | Refills: 3 | Status: SHIPPED | OUTPATIENT
Start: 2024-01-30

## 2024-03-25 DIAGNOSIS — I48.0 PAROXYSMAL ATRIAL FIBRILLATION (HCC): ICD-10-CM

## 2024-03-25 NOTE — TELEPHONE ENCOUNTER
A Refill Has Been Requested for Jose Guevara    Medication Requested  Requested Prescriptions     Pending Prescriptions Disp Refills    metoprolol tartrate (LOPRESSOR) 50 MG tablet [Pharmacy Med Name: METOPROLOL TART 50 MG TAB** 50 Tablet] 60 tablet 5     Sig: TAKE 1 TABLET BY MOUTH 2 TIMES DAILY       Last Visit Date (If Applicable)  11/29/2023    Next Visit Date (If Applicable)  Visit date not found

## 2024-03-26 RX ORDER — METOPROLOL TARTRATE 50 MG/1
50 TABLET, FILM COATED ORAL 2 TIMES DAILY
Qty: 60 TABLET | Refills: 5 | Status: SHIPPED | OUTPATIENT
Start: 2024-03-26

## 2024-04-22 ENCOUNTER — OFFICE VISIT (OUTPATIENT)
Dept: PODIATRY | Age: 64
End: 2024-04-22
Payer: MEDICARE

## 2024-04-22 VITALS
BODY MASS INDEX: 26.31 KG/M2 | SYSTOLIC BLOOD PRESSURE: 117 MMHG | WEIGHT: 205 LBS | HEIGHT: 74 IN | HEART RATE: 60 BPM | DIASTOLIC BLOOD PRESSURE: 72 MMHG

## 2024-04-22 DIAGNOSIS — M77.42 METATARSALGIA OF BOTH FEET: ICD-10-CM

## 2024-04-22 DIAGNOSIS — M20.41 HAMMERTOE, BILATERAL: ICD-10-CM

## 2024-04-22 DIAGNOSIS — M21.41 PES PLANUS OF BOTH FEET: Primary | ICD-10-CM

## 2024-04-22 DIAGNOSIS — M77.41 METATARSALGIA OF BOTH FEET: ICD-10-CM

## 2024-04-22 DIAGNOSIS — M20.42 HAMMERTOE, BILATERAL: ICD-10-CM

## 2024-04-22 DIAGNOSIS — M21.42 PES PLANUS OF BOTH FEET: Primary | ICD-10-CM

## 2024-04-22 PROCEDURE — 99213 OFFICE O/P EST LOW 20 MIN: CPT | Performed by: PODIATRIST

## 2024-04-22 PROCEDURE — 3017F COLORECTAL CA SCREEN DOC REV: CPT

## 2024-04-22 PROCEDURE — 1123F ACP DISCUSS/DSCN MKR DOCD: CPT

## 2024-04-22 PROCEDURE — 3074F SYST BP LT 130 MM HG: CPT

## 2024-04-22 PROCEDURE — 11056 PARNG/CUTG B9 HYPRKR LES 2-4: CPT

## 2024-04-22 PROCEDURE — 3078F DIAST BP <80 MM HG: CPT

## 2024-04-22 PROCEDURE — G8417 CALC BMI ABV UP PARAM F/U: HCPCS

## 2024-04-22 PROCEDURE — G8427 DOCREV CUR MEDS BY ELIG CLIN: HCPCS

## 2024-04-22 PROCEDURE — 99213 OFFICE O/P EST LOW 20 MIN: CPT

## 2024-04-22 PROCEDURE — 4004F PT TOBACCO SCREEN RCVD TLK: CPT

## 2024-04-22 NOTE — PROGRESS NOTES
Patient instructed to remove shoes and socks and instructed to sit in exam chair.  Current PCP is Sandra Tavares MD and date of last visit was 12/20/2023.   Do you have a follow up visit scheduled?  Yes  If yes, the date is 06/19/2024.    
is maintained.    Most recent 5/11/23:           Assessment   Jose Guevara is a 63 y.o. male with     Diagnosis Orders   1. Pes planus of both feet        2. Metatarsalgia of both feet        3. Hammertoe, bilateral                 Plan   Patient examined and evaluated  Nature and etiology of plantar keratotic tissue explained to patient.  Relayed to patient that this is likely due to underlying biomechanical causes, however due to multiple surgeries in the past patient states surgical approach is not something he would like to move forward with.    At this time recommend conservative treatment options such as orthotics or cut outs for metatarsal prominence, patient is also refusing these at this time.  He states he only finds relief with paring of HPK's  Superficial debridement and paring of plantar keratoses bilaterally without incident. Patient tolerated procedure well.  Patient may follow-up as needed at this time.      Ml Houston DPM   Podiatric Medicine & Surgery   4/22/2024 at 2:14 PM

## 2024-06-11 NOTE — TELEPHONE ENCOUNTER
Jose Guevara is calling to request a refill on the following medication(s):    Medication Request:  Flomax      Last Visit Date (If Applicable):  12/20/2023    Next Visit Date:    6/19/2024

## 2024-06-13 RX ORDER — TAMSULOSIN HYDROCHLORIDE 0.4 MG/1
0.4 CAPSULE ORAL DAILY
Qty: 30 CAPSULE | Refills: 5 | Status: SHIPPED | OUTPATIENT
Start: 2024-06-13

## 2024-06-19 ENCOUNTER — OFFICE VISIT (OUTPATIENT)
Dept: INTERNAL MEDICINE | Age: 64
End: 2024-06-19
Payer: MEDICARE

## 2024-06-19 VITALS
SYSTOLIC BLOOD PRESSURE: 122 MMHG | DIASTOLIC BLOOD PRESSURE: 78 MMHG | OXYGEN SATURATION: 96 % | HEIGHT: 74 IN | HEART RATE: 73 BPM | WEIGHT: 210 LBS | RESPIRATION RATE: 16 BRPM | BODY MASS INDEX: 26.95 KG/M2 | TEMPERATURE: 97.4 F

## 2024-06-19 DIAGNOSIS — I10 WELL-CONTROLLED HYPERTENSION: Primary | ICD-10-CM

## 2024-06-19 DIAGNOSIS — J31.0 RHINITIS, UNSPECIFIED TYPE: ICD-10-CM

## 2024-06-19 DIAGNOSIS — Z23 NEED FOR PNEUMOCOCCAL VACCINE: ICD-10-CM

## 2024-06-19 DIAGNOSIS — E55.9 VITAMIN D DEFICIENCY: ICD-10-CM

## 2024-06-19 DIAGNOSIS — R73.03 PRE-DIABETES: ICD-10-CM

## 2024-06-19 DIAGNOSIS — I48.0 PAROXYSMAL ATRIAL FIBRILLATION (HCC): ICD-10-CM

## 2024-06-19 DIAGNOSIS — E78.5 HYPERLIPIDEMIA, UNSPECIFIED HYPERLIPIDEMIA TYPE: ICD-10-CM

## 2024-06-19 PROBLEM — E11.9 TYPE 2 DIABETES MELLITUS WITHOUT COMPLICATION, WITHOUT LONG-TERM CURRENT USE OF INSULIN (HCC): Status: ACTIVE | Noted: 2024-06-19

## 2024-06-19 LAB — HBA1C MFR BLD: 6.3 %

## 2024-06-19 PROCEDURE — 99214 OFFICE O/P EST MOD 30 MIN: CPT | Performed by: STUDENT IN AN ORGANIZED HEALTH CARE EDUCATION/TRAINING PROGRAM

## 2024-06-19 PROCEDURE — 99211 OFF/OP EST MAY X REQ PHY/QHP: CPT | Performed by: STUDENT IN AN ORGANIZED HEALTH CARE EDUCATION/TRAINING PROGRAM

## 2024-06-19 PROCEDURE — 90677 PCV20 VACCINE IM: CPT | Performed by: STUDENT IN AN ORGANIZED HEALTH CARE EDUCATION/TRAINING PROGRAM

## 2024-06-19 PROCEDURE — 3074F SYST BP LT 130 MM HG: CPT | Performed by: STUDENT IN AN ORGANIZED HEALTH CARE EDUCATION/TRAINING PROGRAM

## 2024-06-19 PROCEDURE — 4004F PT TOBACCO SCREEN RCVD TLK: CPT | Performed by: STUDENT IN AN ORGANIZED HEALTH CARE EDUCATION/TRAINING PROGRAM

## 2024-06-19 PROCEDURE — G8417 CALC BMI ABV UP PARAM F/U: HCPCS | Performed by: STUDENT IN AN ORGANIZED HEALTH CARE EDUCATION/TRAINING PROGRAM

## 2024-06-19 PROCEDURE — 1123F ACP DISCUSS/DSCN MKR DOCD: CPT | Performed by: STUDENT IN AN ORGANIZED HEALTH CARE EDUCATION/TRAINING PROGRAM

## 2024-06-19 PROCEDURE — G8427 DOCREV CUR MEDS BY ELIG CLIN: HCPCS | Performed by: STUDENT IN AN ORGANIZED HEALTH CARE EDUCATION/TRAINING PROGRAM

## 2024-06-19 PROCEDURE — 3017F COLORECTAL CA SCREEN DOC REV: CPT | Performed by: STUDENT IN AN ORGANIZED HEALTH CARE EDUCATION/TRAINING PROGRAM

## 2024-06-19 PROCEDURE — 83036 HEMOGLOBIN GLYCOSYLATED A1C: CPT | Performed by: STUDENT IN AN ORGANIZED HEALTH CARE EDUCATION/TRAINING PROGRAM

## 2024-06-19 PROCEDURE — 3078F DIAST BP <80 MM HG: CPT | Performed by: STUDENT IN AN ORGANIZED HEALTH CARE EDUCATION/TRAINING PROGRAM

## 2024-06-19 RX ORDER — LISINOPRIL 20 MG/1
20 TABLET ORAL DAILY
Qty: 90 TABLET | Refills: 2 | Status: SHIPPED | OUTPATIENT
Start: 2024-06-19

## 2024-06-19 RX ORDER — AMLODIPINE BESYLATE 10 MG/1
10 TABLET ORAL DAILY
Qty: 90 TABLET | Refills: 2 | Status: SHIPPED | OUTPATIENT
Start: 2024-06-19

## 2024-06-19 RX ORDER — CETIRIZINE HYDROCHLORIDE 10 MG/1
10 TABLET ORAL DAILY
Qty: 30 TABLET | Refills: 2 | Status: SHIPPED | OUTPATIENT
Start: 2024-06-19

## 2024-06-19 RX ORDER — OMEPRAZOLE 20 MG/1
20 CAPSULE, DELAYED RELEASE ORAL DAILY
Qty: 90 CAPSULE | Refills: 2 | Status: SHIPPED | OUTPATIENT
Start: 2024-06-19

## 2024-06-19 RX ORDER — FLUTICASONE PROPIONATE 50 MCG
1 SPRAY, SUSPENSION (ML) NASAL DAILY
Qty: 16 G | Refills: 1 | Status: SHIPPED | OUTPATIENT
Start: 2024-06-19

## 2024-06-19 RX ORDER — ATORVASTATIN CALCIUM 40 MG/1
40 TABLET, FILM COATED ORAL NIGHTLY
Qty: 90 TABLET | Refills: 2 | Status: SHIPPED | OUTPATIENT
Start: 2024-06-19

## 2024-06-19 RX ORDER — MELATONIN
1000 DAILY
Qty: 90 TABLET | Refills: 2 | Status: SHIPPED | OUTPATIENT
Start: 2024-06-19

## 2024-06-19 RX ORDER — TAMSULOSIN HYDROCHLORIDE 0.4 MG/1
0.4 CAPSULE ORAL DAILY
Qty: 90 CAPSULE | Refills: 2 | Status: SHIPPED | OUTPATIENT
Start: 2024-06-19

## 2024-06-19 RX ORDER — METOPROLOL TARTRATE 50 MG/1
50 TABLET, FILM COATED ORAL 2 TIMES DAILY
Qty: 180 TABLET | Refills: 2 | Status: SHIPPED | OUTPATIENT
Start: 2024-06-19

## 2024-06-19 RX ORDER — LIDOCAINE 50 MG/G
OINTMENT TOPICAL
COMMUNITY
Start: 2024-05-08

## 2024-06-19 SDOH — ECONOMIC STABILITY: FOOD INSECURITY: WITHIN THE PAST 12 MONTHS, THE FOOD YOU BOUGHT JUST DIDN'T LAST AND YOU DIDN'T HAVE MONEY TO GET MORE.: NEVER TRUE

## 2024-06-19 SDOH — ECONOMIC STABILITY: FOOD INSECURITY: WITHIN THE PAST 12 MONTHS, YOU WORRIED THAT YOUR FOOD WOULD RUN OUT BEFORE YOU GOT MONEY TO BUY MORE.: NEVER TRUE

## 2024-06-19 SDOH — ECONOMIC STABILITY: INCOME INSECURITY: HOW HARD IS IT FOR YOU TO PAY FOR THE VERY BASICS LIKE FOOD, HOUSING, MEDICAL CARE, AND HEATING?: NOT HARD AT ALL

## 2024-06-19 ASSESSMENT — PATIENT HEALTH QUESTIONNAIRE - PHQ9
SUM OF ALL RESPONSES TO PHQ QUESTIONS 1-9: 0
SUM OF ALL RESPONSES TO PHQ9 QUESTIONS 1 & 2: 0
1. LITTLE INTEREST OR PLEASURE IN DOING THINGS: NOT AT ALL
SUM OF ALL RESPONSES TO PHQ QUESTIONS 1-9: 0
2. FEELING DOWN, DEPRESSED OR HOPELESS: NOT AT ALL

## 2024-06-19 ASSESSMENT — ENCOUNTER SYMPTOMS
VOMITING: 0
SHORTNESS OF BREATH: 0
FACIAL SWELLING: 0
CONSTIPATION: 0
DIARRHEA: 0
COUGH: 0
RHINORRHEA: 0
WHEEZING: 0
CHEST TIGHTNESS: 0
ABDOMINAL PAIN: 0
BACK PAIN: 0
NAUSEA: 0

## 2024-06-19 NOTE — PROGRESS NOTES
HPI:  Jose Guevara is a.63 y.o. 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 Patient would like to get Pneumonia vaccine.       History:  Past Medical History:   Diagnosis Date    Alcoholic (HCC) 05/25/2019    Cocaine dependence (HCC) 06/23/2021    GERD (gastroesophageal reflux disease)     Hallux abducto valgus     Hammer toe of left foot     History of atrial fibrillation     has followed with Dr. [facility] last visit 6/2021. On Eliquis    History of BPH     History of echocardiogram 2019    EF 62%    Hypertension     Dr. Pilar Dill    Osteoarthritis     Prediabetes     border line. not on meds    Rheumatoid arthritis(714.0)     newly diagnosed    Wears glasses     Wellness examination 05/2022    Dr. Pilar Dill [facility]     Past Surgical History:   Procedure Laterality Date    COLONOSCOPY  10/2012    COLONOSCOPY  06/05/2023    COLONOSCOPY N/A 6/5/2023    COLONOSCOPY DIAGNOSTIC performed by Sunita Stanley MD at [facility] OR    FOOT SURGERY      Left foot surgery HDS 2-4 and bunion     FOOT SURGERY Left 06/27/2022    1ST MPJ FUSION, REVISION HAMMER TOE CORRECTION 2ND, 3RD AND 4TH, REMOVAL HARDWARE 3RD AND 4TH METATARSAL     HAMMER TOE SURGERY Left 06/27/2022    1ST MPJ FUSION, REVISION HAMMER TOE CORRECTION 2ND, 3RD AND 4TH, REMOVAL

## 2024-07-08 ENCOUNTER — HOSPITAL ENCOUNTER (OUTPATIENT)
Age: 64
Discharge: HOME | End: 2024-07-08
Payer: MEDICARE

## 2024-07-08 PROBLEM — B35.1 ONYCHOMYCOSIS OF TOENAIL: Status: ACTIVE | Noted: 2024-07-08

## 2024-07-08 LAB
CHOLEST SERPL-MCNC: 93 MG/DL (ref 0–199)
CHOLESTEROL/HDL RATIO: 2
HDLC SERPL-MCNC: 52 MG/DL
LDLC SERPL CALC-MCNC: 26 MG/DL (ref 0–100)
TRIGL SERPL-MCNC: 74 MG/DL (ref 0–149)
VLDLC SERPL CALC-MCNC: 15 MG/DL

## 2024-07-08 PROCEDURE — 80061 LIPID PANEL: CPT

## 2024-07-08 PROCEDURE — 36415 COLL VENOUS BLD VENIPUNCTURE: CPT

## 2024-07-08 PROCEDURE — 80048 BASIC METABOLIC PNL TOTAL CA: CPT

## 2024-07-09 LAB
ANION GAP SERPL CALCULATED.3IONS-SCNC: 13 MMOL/L (ref 9–16)
BUN SERPL-MCNC: 12 MG/DL (ref 8–23)
CALCIUM SERPL-MCNC: 9.2 MG/DL (ref 8.6–10.4)
CHLORIDE SERPL-SCNC: 107 MMOL/L (ref 98–107)
CO2 SERPL-SCNC: 21 MMOL/L (ref 20–31)
CREAT SERPL-MCNC: 1 MG/DL (ref 0.7–1.2)
GFR, ESTIMATED: 82 ML/MIN/1.73M2
GLUCOSE SERPL-MCNC: 96 MG/DL (ref 74–99)
POTASSIUM SERPL-SCNC: 3.7 MMOL/L (ref 3.7–5.3)
SODIUM SERPL-SCNC: 141 MMOL/L (ref 136–145)

## 2024-07-19 NOTE — PROGRESS NOTES
Patient instructed to remove shoes and socks and instructed to sit in exam chair.  Current PCP is Sandra Tavares MD and date of last visit was 06/19/2024.   Do you have a follow up visit scheduled?  No  If yes, the date is         Diabetic visit information    Blood pressure (Control is BP <140/90)  BP Readings from Last 3 Encounters:   06/19/24 122/78   04/22/24 117/72   01/22/24 132/78       BP taken with correct size cuff? - Yes   Repeated if > 140/90 Yes      Tobacco use:  Patient  reports that he has been smoking cigarettes. He started smoking about 22 years ago. He has a 5.6 pack-year smoking history. He has been exposed to tobacco smoke. He has never used smokeless tobacco.  If Smoker - Cessation materials given?- Yes       Diabetic Health Maintenance Items due  There are no preventive care reminders to display for this patient.    Diabetic retinal exam done in last year? - Yes   If No: remind patient that it is due and they should schedule an exam    Medications  Is patient taking any medications for diabetes? -   Yes  Have blood sugars been controlled?   Fasting blood sugars under 120   -   Yes   Random home sugars or today's POCT glucose is under 180 -   Yes   []  If No to the above then patient should schedule appt with PCP.     Diabetic Plan    A1C Plan  Lab Results   Component Value Date    LABA1C 6.3 06/19/2024    LABA1C 5.7 11/29/2023    LABA1C 5.7 05/16/2022      []  If A1C over 8 and last result >3 months ago - Order A1C and refer to PCP   []  If last A1C over 6 months ago - Order A1C and refer to PCP for follow up   []  If elevated blood sugars > 180 - refer to PCP for follow up    []  Blood sugar controlled - A1C under 8 and last check was < 6 months      Cholesterol Plan   No components found for: \"LDLCALC\", \"LDLCHOLESTEROL\"   []  If LDL > 100 and last result >3 months ago - order Fasting lipids and refer to PCP for follow up   []  If LDL < 100 and over 1 year ago - Order Fasting lipids and refer

## 2024-07-22 ENCOUNTER — OFFICE VISIT (OUTPATIENT)
Dept: PODIATRY | Age: 64
End: 2024-07-22
Payer: MEDICARE

## 2024-07-22 VITALS
BODY MASS INDEX: 27.05 KG/M2 | DIASTOLIC BLOOD PRESSURE: 80 MMHG | SYSTOLIC BLOOD PRESSURE: 136 MMHG | HEART RATE: 60 BPM | HEIGHT: 74 IN | WEIGHT: 210.8 LBS

## 2024-07-22 DIAGNOSIS — M21.41 PES PLANUS OF BOTH FEET: Primary | ICD-10-CM

## 2024-07-22 DIAGNOSIS — Q82.8 PLANTAR KERATOSIS: ICD-10-CM

## 2024-07-22 DIAGNOSIS — M21.42 PES PLANUS OF BOTH FEET: Primary | ICD-10-CM

## 2024-07-22 PROCEDURE — 3075F SYST BP GE 130 - 139MM HG: CPT

## 2024-07-22 PROCEDURE — 11721 DEBRIDE NAIL 6 OR MORE: CPT

## 2024-07-22 PROCEDURE — G8417 CALC BMI ABV UP PARAM F/U: HCPCS

## 2024-07-22 PROCEDURE — 99213 OFFICE O/P EST LOW 20 MIN: CPT

## 2024-07-22 PROCEDURE — 11056 PARNG/CUTG B9 HYPRKR LES 2-4: CPT

## 2024-07-22 PROCEDURE — 1123F ACP DISCUSS/DSCN MKR DOCD: CPT

## 2024-07-22 PROCEDURE — 3079F DIAST BP 80-89 MM HG: CPT

## 2024-07-22 PROCEDURE — G8427 DOCREV CUR MEDS BY ELIG CLIN: HCPCS

## 2024-07-22 PROCEDURE — 99213 OFFICE O/P EST LOW 20 MIN: CPT | Performed by: PODIATRIST

## 2024-07-22 PROCEDURE — 3017F COLORECTAL CA SCREEN DOC REV: CPT

## 2024-07-22 PROCEDURE — 4004F PT TOBACCO SCREEN RCVD TLK: CPT

## 2024-07-22 NOTE — PROGRESS NOTES
[facility]  [address]   [address] [place] [address]  Tel: [phone]   Fax: [phone]    Subjective     Interval: Patient presents to clinic for pain due to history of painful calluses appearing on the plantar surfaces of both feet. Patient says he has an extensive history of surgeries in his feet and does not want another surgery at this time. Patient says he has tried padding in the past, but it didn't help. Patient is also complaining of painful elongated nails and is requesting his nails to be cut today.. No other pedal complaints at this time.    S/p Procedures DOS: 6/27/2022:   1st Metatarsophalanjeal joint arthrodesis, left foot.  Removal of hardware 2nd metatarsal, left foot.  Revision hammertoe correction 2nd digit left foot.  Removal of hardware 3rd metatarsal left foot.  Revision hammertoe correction 3rd digit left foot.  Flexor tenotomy 4th digit left foot.    HPI:  This 61 y.o. male presents for a post op visit.  He had first metatarsophalangeal joint arthrodesis with correction of the 2nd/ 3rd digit; 4th digit flexor tenotomy. Patient is a 1 cig a day smoker, non diabetic. History of Afib, and HTN that is contolled. History of cocaine use in the past, not currently; further alcohol dependence in the past but not currently. Has histroy of GERD for which he recently had colonoscopy. Pain is well controlled at this time.  Has been icing and elevating the extremity as instructed preoperatively and has been weightbearing to the left lower extremity. Denies any current nausea, vomiting, fever, chills, shortness of breath, chest pain or calf pain. Denies any other pedal complaints  Primary care physician is Sandra Tavares MD.    ROS:    Constitutional: Denies nausea, vomiting, fever, chills.  Neurologic: Denies numbness, tingling, and burning in the feet.    Vascular: Denies symptoms of lower extremity claudication.    Skin: Denies open wounds.  Otherwise negative except as noted

## 2024-09-23 ENCOUNTER — TELEPHONE (OUTPATIENT)
Dept: INTERNAL MEDICINE | Age: 64
End: 2024-09-23

## 2024-10-01 DIAGNOSIS — J31.0 RHINITIS, UNSPECIFIED TYPE: ICD-10-CM

## 2024-10-01 RX ORDER — FLUTICASONE PROPIONATE 50 MCG
SPRAY, SUSPENSION (ML) NASAL
Qty: 16 G | Refills: 1 | Status: SHIPPED | OUTPATIENT
Start: 2024-10-01

## 2024-10-01 NOTE — TELEPHONE ENCOUNTER
Jose Guevara is calling to request a refill on the following medication(s):    Medication Request:  Requested Prescriptions     Pending Prescriptions Disp Refills    fluticasone (FLONASE) 50 MCG/ACT nasal spray [Pharmacy Med Name: FLUTICASONE NASAL SPRAY 50 ANT] 16 g 1     Sig: INHALE 1 SPRAY INTO EACH NOSTRIL DAILY       Last Visit Date (If Applicable):  6/19/2024    Next Visit Date:    Visit date not found

## 2024-10-04 ENCOUNTER — TELEPHONE (OUTPATIENT)
Dept: INTERNAL MEDICINE | Age: 64
End: 2024-10-04

## 2024-10-04 NOTE — TELEPHONE ENCOUNTER
Received request for PA in/on faxed for fluticasone propionate diskus.    Review of chart shows this has never been prescribed, only fluticasone propionate nasal spray.    PC to pharmacy to clarify, left secure VM requesting call back.

## 2024-10-07 NOTE — TELEPHONE ENCOUNTER
PC to pharmacy again as no return call received, no answer again. Left message again requesting call back to clarify this PA request.

## 2024-10-28 ENCOUNTER — TELEPHONE (OUTPATIENT)
Dept: INTERNAL MEDICINE | Age: 64
End: 2024-10-28

## 2024-11-05 NOTE — TELEPHONE ENCOUNTER
PC to pt to schedule AWV, phone rang once and was sent to VM. Left HIPAA compliant message identifying self and nature of call, requested call back to writer to schedule AWV, phone number given.

## 2024-11-06 ENCOUNTER — TELEPHONE (OUTPATIENT)
Dept: INTERNAL MEDICINE | Age: 64
End: 2024-11-06

## 2024-11-06 NOTE — TELEPHONE ENCOUNTER
Received VM from pt requesting an appt with PCP in December.    PC to pt to explain PCP is out on maternity leave, soonest available appt at this time is in March, offer pt appt with alternate physician. Phone rang once and was sent to VM. Left HIPAA compliant message identifying self and nature of call, requested call back to writer, phone number given.

## 2024-11-11 NOTE — TELEPHONE ENCOUNTER
PC to pt to schedule appt, rang once and was sent to VM. Left HIPAA compliant message identifying self and nature of call, requested call back to writer or office, phone numbers given.

## 2024-12-05 ENCOUNTER — TELEPHONE (OUTPATIENT)
Dept: INTERNAL MEDICINE | Age: 64
End: 2024-12-05

## 2025-01-09 NOTE — PROGRESS NOTES
Patient instructed to remove shoes and socks and instructed to sit in exam chair.  Current PCP is Sandra Tavares MD and date of last visit was 07/08/2024.   Do you have a follow up visit scheduled?  Yes  If yes, the date is 01/10/2025

## 2025-01-10 ENCOUNTER — TELEPHONE (OUTPATIENT)
Dept: GASTROENTEROLOGY | Age: 65
End: 2025-01-10

## 2025-01-10 ENCOUNTER — OFFICE VISIT (OUTPATIENT)
Dept: INTERNAL MEDICINE | Age: 65
End: 2025-01-10
Payer: MEDICARE

## 2025-01-10 VITALS
HEIGHT: 74 IN | SYSTOLIC BLOOD PRESSURE: 130 MMHG | WEIGHT: 229 LBS | BODY MASS INDEX: 29.39 KG/M2 | TEMPERATURE: 97.3 F | DIASTOLIC BLOOD PRESSURE: 86 MMHG | OXYGEN SATURATION: 96 % | RESPIRATION RATE: 16 BRPM | HEART RATE: 61 BPM

## 2025-01-10 DIAGNOSIS — Z12.11 COLON CANCER SCREENING: ICD-10-CM

## 2025-01-10 DIAGNOSIS — I48.0 PAROXYSMAL ATRIAL FIBRILLATION (HCC): ICD-10-CM

## 2025-01-10 DIAGNOSIS — Z23 NEEDS FLU SHOT: ICD-10-CM

## 2025-01-10 DIAGNOSIS — N39.43 DRIBBLING OF URINE: Primary | ICD-10-CM

## 2025-01-10 DIAGNOSIS — R73.03 PRE-DIABETES: ICD-10-CM

## 2025-01-10 LAB — HBA1C MFR BLD: 6 %

## 2025-01-10 PROCEDURE — 3079F DIAST BP 80-89 MM HG: CPT | Performed by: STUDENT IN AN ORGANIZED HEALTH CARE EDUCATION/TRAINING PROGRAM

## 2025-01-10 PROCEDURE — 83036 HEMOGLOBIN GLYCOSYLATED A1C: CPT | Performed by: STUDENT IN AN ORGANIZED HEALTH CARE EDUCATION/TRAINING PROGRAM

## 2025-01-10 PROCEDURE — 3017F COLORECTAL CA SCREEN DOC REV: CPT | Performed by: STUDENT IN AN ORGANIZED HEALTH CARE EDUCATION/TRAINING PROGRAM

## 2025-01-10 PROCEDURE — G8417 CALC BMI ABV UP PARAM F/U: HCPCS | Performed by: STUDENT IN AN ORGANIZED HEALTH CARE EDUCATION/TRAINING PROGRAM

## 2025-01-10 PROCEDURE — 90653 IIV ADJUVANT VACCINE IM: CPT | Performed by: STUDENT IN AN ORGANIZED HEALTH CARE EDUCATION/TRAINING PROGRAM

## 2025-01-10 PROCEDURE — 3075F SYST BP GE 130 - 139MM HG: CPT | Performed by: STUDENT IN AN ORGANIZED HEALTH CARE EDUCATION/TRAINING PROGRAM

## 2025-01-10 PROCEDURE — 4004F PT TOBACCO SCREEN RCVD TLK: CPT | Performed by: STUDENT IN AN ORGANIZED HEALTH CARE EDUCATION/TRAINING PROGRAM

## 2025-01-10 PROCEDURE — 99213 OFFICE O/P EST LOW 20 MIN: CPT | Performed by: STUDENT IN AN ORGANIZED HEALTH CARE EDUCATION/TRAINING PROGRAM

## 2025-01-10 PROCEDURE — G8427 DOCREV CUR MEDS BY ELIG CLIN: HCPCS | Performed by: STUDENT IN AN ORGANIZED HEALTH CARE EDUCATION/TRAINING PROGRAM

## 2025-01-10 PROCEDURE — 1123F ACP DISCUSS/DSCN MKR DOCD: CPT | Performed by: STUDENT IN AN ORGANIZED HEALTH CARE EDUCATION/TRAINING PROGRAM

## 2025-01-10 PROCEDURE — 1159F MED LIST DOCD IN RCRD: CPT | Performed by: STUDENT IN AN ORGANIZED HEALTH CARE EDUCATION/TRAINING PROGRAM

## 2025-01-10 SDOH — ECONOMIC STABILITY: FOOD INSECURITY: WITHIN THE PAST 12 MONTHS, YOU WORRIED THAT YOUR FOOD WOULD RUN OUT BEFORE YOU GOT MONEY TO BUY MORE.: NEVER TRUE

## 2025-01-10 SDOH — ECONOMIC STABILITY: FOOD INSECURITY: WITHIN THE PAST 12 MONTHS, THE FOOD YOU BOUGHT JUST DIDN'T LAST AND YOU DIDN'T HAVE MONEY TO GET MORE.: NEVER TRUE

## 2025-01-10 ASSESSMENT — PATIENT HEALTH QUESTIONNAIRE - PHQ9
3. TROUBLE FALLING OR STAYING ASLEEP: NOT AT ALL
SUM OF ALL RESPONSES TO PHQ QUESTIONS 1-9: 0
SUM OF ALL RESPONSES TO PHQ QUESTIONS 1-9: 0
8. MOVING OR SPEAKING SO SLOWLY THAT OTHER PEOPLE COULD HAVE NOTICED. OR THE OPPOSITE, BEING SO FIGETY OR RESTLESS THAT YOU HAVE BEEN MOVING AROUND A LOT MORE THAN USUAL: NOT AT ALL
5. POOR APPETITE OR OVEREATING: NOT AT ALL
6. FEELING BAD ABOUT YOURSELF - OR THAT YOU ARE A FAILURE OR HAVE LET YOURSELF OR YOUR FAMILY DOWN: NOT AT ALL
SUM OF ALL RESPONSES TO PHQ QUESTIONS 1-9: 0
7. TROUBLE CONCENTRATING ON THINGS, SUCH AS READING THE NEWSPAPER OR WATCHING TELEVISION: NOT AT ALL
9. THOUGHTS THAT YOU WOULD BE BETTER OFF DEAD, OR OF HURTING YOURSELF: NOT AT ALL
SUM OF ALL RESPONSES TO PHQ QUESTIONS 1-9: 0
1. LITTLE INTEREST OR PLEASURE IN DOING THINGS: NOT AT ALL
2. FEELING DOWN, DEPRESSED OR HOPELESS: NOT AT ALL
4. FEELING TIRED OR HAVING LITTLE ENERGY: NOT AT ALL
SUM OF ALL RESPONSES TO PHQ9 QUESTIONS 1 & 2: 0
10. IF YOU CHECKED OFF ANY PROBLEMS, HOW DIFFICULT HAVE THESE PROBLEMS MADE IT FOR YOU TO DO YOUR WORK, TAKE CARE OF THINGS AT HOME, OR GET ALONG WITH OTHER PEOPLE: NOT DIFFICULT AT ALL

## 2025-01-10 NOTE — TELEPHONE ENCOUNTER
Patient has referral to have screening colonoscopy, previous patient of Dr. Sunita Stanley, patient states he takes a blood thinning medication.Patient  was scheduled  for office visit on 03/03/2025 with Dr. Alexandre, at [place], per patient request, patient would like a sooner appointment please call patient at [phone] [facility]/sc

## 2025-01-10 NOTE — PROGRESS NOTES
Gastrointestinal:  Negative for abdominal pain, constipation, diarrhea, nausea and vomiting.   Genitourinary:  Positive for difficulty urinating. Negative for dysuria, frequency, hematuria and urgency.        Dribbling and nocturia   Musculoskeletal:  Negative for back pain.   Neurological:  Negative for dizziness, tremors, weakness, numbness and headaches.   Psychiatric/Behavioral:  Negative for confusion, decreased concentration and hallucinations. The patient is not nervous/anxious.        PHYSICAL EXAM:      Vitals:    01/10/25 0843   BP: 130/86   Pulse: 61   Resp: 16   Temp: 97.3 °F (36.3 °C)   SpO2: 96%     BP Readings from Last 3 Encounters:   01/10/25 130/86   07/22/24 136/80   06/19/24 122/78       Physical Exam  Constitutional:       Appearance: Normal appearance. He is normal weight.   HENT:      Head: Normocephalic and atraumatic.   Eyes:      Conjunctiva/sclera: Conjunctivae normal.   Cardiovascular:      Rate and Rhythm: Normal rate and regular rhythm.      Heart sounds: Normal heart sounds.   Pulmonary:      Effort: Pulmonary effort is normal.      Breath sounds: Normal breath sounds.   Neurological:      General: No focal deficit present.      Mental Status: He is alert and oriented to person, place, and time. Mental status is at baseline.   Psychiatric:         Mood and Affect: Mood normal.         Behavior: Behavior normal.         Thought Content: Thought content normal.         Judgment: Judgment normal.     Monofilament test is normal    LABORATORY FINDINGS:    CBC:   Lab Results   Component Value Date/Time    WBC 6.0 06/16/2022 09:05 AM    HGB 12.9 06/16/2022 09:05 AM     06/16/2022 09:05 AM     BMP:    Lab Results   Component Value Date/Time     07/08/2024 08:54 AM    K 3.7 07/08/2024 08:54 AM     07/08/2024 08:54 AM    CO2 21 07/08/2024 08:54 AM    BUN 12 07/08/2024 08:54 AM    CREATININE 1.0 07/08/2024 08:54 AM    GLUCOSE 96 07/08/2024 08:54 AM    GLUCOSE 89 02/02/2012

## 2025-01-15 DIAGNOSIS — J31.0 RHINITIS, UNSPECIFIED TYPE: ICD-10-CM

## 2025-01-15 RX ORDER — FLUTICASONE PROPIONATE 50 MCG
SPRAY, SUSPENSION (ML) NASAL
Qty: 16 G | Refills: 1 | Status: SHIPPED | OUTPATIENT
Start: 2025-01-15

## 2025-01-15 NOTE — TELEPHONE ENCOUNTER
<100)   AST (U/L)   Date Value   05/16/2022 24     ALT (U/L)   Date Value   05/16/2022 21     BUN (mg/dL)   Date Value   07/08/2024 12     BP Readings from Last 3 Encounters:   01/10/25 130/86   07/22/24 136/80   06/19/24 122/78          (goal 120/80)    All Future Testing planned in [facility]  Lab Frequency Next Occurrence   Lipid, Fasting Once 06/19/2024   Basic Metabolic Panel Once 06/19/2024   Urinalysis with Reflex to Culture Once 01/10/2025    URINARY BLADDER LIMITED Once 01/15/2025         Patient Active Problem List:     S/P hammer toe correction     Pre-diabetes     Gastroesophageal reflux disease     Well-controlled hypertension     Smoker     A-fib (HCC)     Cataract     Primary osteoarthritis of both knees     Chronic midline low back pain without sciatica     Benign prostatic hyperplasia with incomplete bladder emptying     Hyperlipidemia     Hallux abducto valgus, left     Pain of left foot     Hammer toe of left foot     Pain in toe of left foot     Pain from implanted hardware     Contracture of toe, left     Hemorrhoids     Pes planus of both feet     Type 2 diabetes mellitus without complication, without long-term current use of insulin (HCC)     Onychomycosis of toenail

## 2025-01-20 ENCOUNTER — HOSPITAL ENCOUNTER (OUTPATIENT)
Age: 65
Discharge: HOME | End: 2025-01-20
Payer: MEDICARE

## 2025-01-20 ENCOUNTER — OFFICE VISIT (OUTPATIENT)
Dept: PODIATRY | Age: 65
End: 2025-01-20
Payer: MEDICARE

## 2025-01-20 VITALS
WEIGHT: 229 LBS | SYSTOLIC BLOOD PRESSURE: 148 MMHG | BODY MASS INDEX: 29.39 KG/M2 | HEART RATE: 57 BPM | DIASTOLIC BLOOD PRESSURE: 92 MMHG | HEIGHT: 74 IN

## 2025-01-20 DIAGNOSIS — M79.672 HEEL PAIN, CHRONIC, LEFT: ICD-10-CM

## 2025-01-20 DIAGNOSIS — M72.2 PLANTAR FASCIITIS, BILATERAL: ICD-10-CM

## 2025-01-20 DIAGNOSIS — G89.29 HEEL PAIN, CHRONIC, LEFT: ICD-10-CM

## 2025-01-20 DIAGNOSIS — G89.29 CHRONIC HEEL PAIN, RIGHT: Primary | ICD-10-CM

## 2025-01-20 DIAGNOSIS — M21.42 PES PLANUS OF BOTH FEET: ICD-10-CM

## 2025-01-20 DIAGNOSIS — M79.671 CHRONIC HEEL PAIN, RIGHT: Primary | ICD-10-CM

## 2025-01-20 DIAGNOSIS — M21.41 PES PLANUS OF BOTH FEET: ICD-10-CM

## 2025-01-20 DIAGNOSIS — M20.22 HALLUX RIGIDUS OF LEFT FOOT: ICD-10-CM

## 2025-01-20 DIAGNOSIS — Q66.50 RIGID PES PLANUS, UNSPECIFIED LATERALITY: ICD-10-CM

## 2025-01-20 DIAGNOSIS — N39.43 DRIBBLING OF URINE: ICD-10-CM

## 2025-01-20 DIAGNOSIS — M96.0 NONUNION AFTER ARTHRODESIS: ICD-10-CM

## 2025-01-20 LAB
BILIRUB UR QL STRIP: NEGATIVE
CLARITY UR: CLEAR
COLOR UR: YELLOW
COMMENT: NORMAL
GLUCOSE UR STRIP-MCNC: NEGATIVE MG/DL
HGB UR QL STRIP.AUTO: NEGATIVE
KETONES UR STRIP-MCNC: NEGATIVE MG/DL
LEUKOCYTE ESTERASE UR QL STRIP: NEGATIVE
NITRITE UR QL STRIP: NEGATIVE
PH UR STRIP: 6 [PH] (ref 5–8)
PROT UR STRIP-MCNC: NEGATIVE MG/DL
SP GR UR STRIP: 1.02 (ref 1–1.03)
UROBILINOGEN UR STRIP-ACNC: NORMAL EU/DL (ref 0–1)

## 2025-01-20 PROCEDURE — 3077F SYST BP >= 140 MM HG: CPT

## 2025-01-20 PROCEDURE — G8417 CALC BMI ABV UP PARAM F/U: HCPCS

## 2025-01-20 PROCEDURE — 3080F DIAST BP >= 90 MM HG: CPT

## 2025-01-20 PROCEDURE — 3017F COLORECTAL CA SCREEN DOC REV: CPT

## 2025-01-20 PROCEDURE — 1159F MED LIST DOCD IN RCRD: CPT

## 2025-01-20 PROCEDURE — 4004F PT TOBACCO SCREEN RCVD TLK: CPT

## 2025-01-20 PROCEDURE — 81003 URINALYSIS AUTO W/O SCOPE: CPT

## 2025-01-20 PROCEDURE — 99213 OFFICE O/P EST LOW 20 MIN: CPT

## 2025-01-20 PROCEDURE — G8427 DOCREV CUR MEDS BY ELIG CLIN: HCPCS

## 2025-01-20 PROCEDURE — 1123F ACP DISCUSS/DSCN MKR DOCD: CPT

## 2025-01-20 NOTE — PROGRESS NOTES
[facility]  [address]   [address] [place], [place] [address]  Tel: [phone]   Fax: [phone]    Subjective     Interval: Patient presents to clinic for discussion evaluation for inserts.      S/p Procedures DOS: 6/27/2022:   1st Metatarsophalanjeal joint arthrodesis, left foot.  Removal of hardware 2nd metatarsal, left foot.  Revision hammertoe correction 2nd digit left foot.  Removal of hardware 3rd metatarsal left foot.  Revision hammertoe correction 3rd digit left foot.  Flexor tenotomy 4th digit left foot.    HPI:  This 61 y.o. male presents for a post op visit.  He had first metatarsophalangeal joint arthrodesis with correction of the 2nd/ 3rd digit; 4th digit flexor tenotomy. Patient is a 1 cig a day smoker, non diabetic. History of Afib, and HTN that is contolled. History of cocaine use in the past, not currently; further alcohol dependence in the past but not currently. Has histroy of GERD for which he recently had colonoscopy. Pain is well controlled at this time.  Has been icing and elevating the extremity as instructed preoperatively and has been weightbearing to the left lower extremity. Denies any current nausea, vomiting, fever, chills, shortness of breath, chest pain or calf pain. Denies any other pedal complaints  Primary care physician is Sandra Tavares MD.    ROS:    Constitutional: Denies nausea, vomiting, fever, chills.  Neurologic: Denies numbness, tingling, and burning in the feet.    Vascular: Denies symptoms of lower extremity claudication.    Skin: Denies open wounds.  Otherwise negative except as noted in the HPI.      Objective     Vitals:    01/20/25 1402   BP: (!) 148/92   Pulse: 57       Lab Results   Component Value Date    LABA1C 6.0 01/10/2025       Physical Exam:  General:  Alert and oriented x3. In no acute distress.     Vascular: DP and PT pulses are palpable. CFT <3 seconds to all digits without pedal edema. Hair growth is absent to the level of the

## 2025-01-20 NOTE — PATIENT INSTRUCTIONS
Please obtain Power step inserts from [employer] or [employer].  It may benefit you to obtain a bigger size and then cutting desired fit from the end of the orthotic to compensate proper shoe fit.       ORTHOSIS/ DME   Please contact your insurance to see where you can obtain orthotics that insurance will cover   Please take your order form from clinic to the appointment/ tell the facility that you have an order from your foot and ankle doctor/ podiatrist    Here are our recommended places to go:      [facility]   [address], [place] [address]   [phone]   [address] [address]   [phone]   [address], [place] [address]   [phone]                [facility] [place] [address]   Phone: [phone]   Fax: [phone]          [address], [place] [address]- Phone: [phone] [address] [place] [address] Phone: [phone]

## 2025-01-21 DIAGNOSIS — I10 WELL-CONTROLLED HYPERTENSION: ICD-10-CM

## 2025-01-21 DIAGNOSIS — E78.5 HYPERLIPIDEMIA, UNSPECIFIED HYPERLIPIDEMIA TYPE: ICD-10-CM

## 2025-01-27 RX ORDER — AMLODIPINE BESYLATE 10 MG/1
10 TABLET ORAL DAILY
Qty: 30 TABLET | Refills: 5 | Status: SHIPPED | OUTPATIENT
Start: 2025-01-27

## 2025-01-27 RX ORDER — ATORVASTATIN CALCIUM 40 MG/1
40 TABLET, FILM COATED ORAL NIGHTLY
Qty: 30 TABLET | Refills: 5 | Status: SHIPPED | OUTPATIENT
Start: 2025-01-27

## 2025-02-26 NOTE — TELEPHONE ENCOUNTER
Last visit: 1/10/25  Last Med refill: 6/19/24  Does patient have enough medication for 72 hours: No:     Next Visit Date:  Future Appointments   Date Time Provider Department Center   7/28/2025  1:15 PM Mary Beth Wild DPM [facility]       Health Maintenance   Topic Date Due    Respiratory Syncytial Virus (RSV) Pregnant or age 60 yrs+ (1 - Risk 60-74 years 1-dose series) Never done    COVID-19 Vaccine (6 - 2024-25 season) 09/01/2024    Diabetic Alb to Cr ratio (uACR) test  12/11/2024    Annual Wellness Visit (Medicare Advantage)  Never done    Diabetic retinal exam  06/29/2025 (Originally 8/30/1978)    Lipids  07/08/2025    GFR test (Diabetes, CKD 3-4, OR last GFR 15-59)  07/08/2025    Diabetic foot exam  01/10/2026    A1C test (Diabetic or Prediabetic)  01/10/2026    Depression Screen  01/10/2026    DTaP/Tdap/Td vaccine (2 - Td or Tdap) 09/13/2026    Colorectal Cancer Screen  06/05/2033    Flu vaccine  Completed    Shingles vaccine  Completed    Pneumococcal 50+ years Vaccine  Completed    AAA screen  Completed    Hepatitis C screen  Completed    Hepatitis A vaccine  Aged Out    Hepatitis B vaccine  Aged Out    Hib vaccine  Aged Out    Polio vaccine  Aged Out    Meningococcal (ACWY) vaccine  Aged Out    Pneumococcal 0-49 years Vaccine  Discontinued    HIV screen  Discontinued    Prostate Specific Antigen (PSA) Screening or Monitoring  Discontinued       Hemoglobin A1C (%)   Date Value   01/10/2025 6.0   06/19/2024 6.3   11/29/2023 5.7             ( goal A1C is < 7)   No components found for: \"LABMICR\"  No components found for: \"LDLCHOLESTEROL\", \"LDLCALC\"    (goal LDL is <100)   AST (U/L)   Date Value   05/16/2022 24     ALT (U/L)   Date Value   05/16/2022 21     BUN (mg/dL)   Date Value   07/08/2024 12     BP Readings from Last 3 Encounters:   01/20/25 (!) 148/92   01/10/25 130/86   07/22/24 136/80          (goal 120/80)    All Future Testing planned in [facility]  Lab Frequency Next Occurrence   Lipid,

## 2025-02-28 RX ORDER — OMEPRAZOLE 20 MG/1
20 CAPSULE, DELAYED RELEASE ORAL DAILY
Qty: 90 CAPSULE | Refills: 2 | Status: SHIPPED | OUTPATIENT
Start: 2025-02-28

## 2025-03-11 DIAGNOSIS — I48.0 PAROXYSMAL ATRIAL FIBRILLATION (HCC): ICD-10-CM

## 2025-03-11 NOTE — TELEPHONE ENCOUNTER
ALT (U/L)   Date Value   05/16/2022 21     BUN (mg/dL)   Date Value   07/08/2024 12     BP Readings from Last 3 Encounters:   01/20/25 (!) 148/92   01/10/25 130/86   07/22/24 136/80          (goal 120/80)    All Future Testing planned in [facility]  Lab Frequency Next Occurrence   Lipid, Fasting Once 06/19/2024   Basic Metabolic Panel Once 06/19/2024   US URINARY BLADDER LIMITED Once 01/15/2025   XR FOOT LEFT (MIN 3 VIEWS) Once 01/20/2025         Patient Active Problem List:     S/P hammer toe correction     Pre-diabetes     Gastroesophageal reflux disease     Well-controlled hypertension     Smoker     A-fib (HCC)     Cataract     Primary osteoarthritis of both knees     Chronic midline low back pain without sciatica     Benign prostatic hyperplasia with incomplete bladder emptying     Hyperlipidemia     Hallux abducto valgus, left     Pain of left foot     Hammer toe of left foot     Pain in toe of left foot     Pain from implanted hardware     Contracture of toe, left     Hemorrhoids     Pes planus of both feet     Type 2 diabetes mellitus without complication, without long-term current use of insulin (HCC)     Onychomycosis of toenail

## 2025-03-12 RX ORDER — APIXABAN 5 MG/1
5 TABLET, FILM COATED ORAL 2 TIMES DAILY
Qty: 60 TABLET | Refills: 2 | Status: SHIPPED | OUTPATIENT
Start: 2025-03-12

## 2025-03-26 RX ORDER — LISINOPRIL 20 MG/1
20 TABLET ORAL DAILY
Qty: 30 TABLET | Refills: 2 | Status: SHIPPED | OUTPATIENT
Start: 2025-03-26

## 2025-03-26 NOTE — TELEPHONE ENCOUNTER
Jose Guevara is calling to request a refill on the following medication(s):    Medication Request:  Lisinopril      Last Visit Date (If Applicable):  1/10/2025    Next Visit Date:    Visit date not found

## 2025-04-08 NOTE — PROGRESS NOTES
Patient instructed to remove shoes and socks and instructed to sit in exam chair.  Current PCP is Sandra Tavares MD and date of last visit was 1/10/25.   Do you have a follow up visit scheduled?  No  If yes, the date is unknown

## 2025-04-14 ENCOUNTER — HOSPITAL ENCOUNTER (OUTPATIENT)
Age: 65
Discharge: HOME | End: 2025-04-16
Payer: MEDICARE

## 2025-04-14 ENCOUNTER — HOSPITAL ENCOUNTER (OUTPATIENT)
Dept: GENERAL RADIOLOGY | Age: 65
Discharge: HOME | End: 2025-04-16
Payer: MEDICARE

## 2025-04-14 ENCOUNTER — OFFICE VISIT (OUTPATIENT)
Dept: PODIATRY | Age: 65
End: 2025-04-14
Payer: MEDICARE

## 2025-04-14 VITALS
HEART RATE: 53 BPM | WEIGHT: 221 LBS | SYSTOLIC BLOOD PRESSURE: 118 MMHG | DIASTOLIC BLOOD PRESSURE: 102 MMHG | BODY MASS INDEX: 28.36 KG/M2

## 2025-04-14 DIAGNOSIS — G89.29 HEEL PAIN, CHRONIC, LEFT: ICD-10-CM

## 2025-04-14 DIAGNOSIS — M79.671 CHRONIC HEEL PAIN, RIGHT: Primary | ICD-10-CM

## 2025-04-14 DIAGNOSIS — M72.2 PLANTAR FASCIITIS, BILATERAL: ICD-10-CM

## 2025-04-14 DIAGNOSIS — M20.22 HALLUX RIGIDUS OF LEFT FOOT: ICD-10-CM

## 2025-04-14 DIAGNOSIS — M21.41 PES PLANUS OF BOTH FEET: ICD-10-CM

## 2025-04-14 DIAGNOSIS — M79.672 HEEL PAIN, CHRONIC, LEFT: ICD-10-CM

## 2025-04-14 DIAGNOSIS — G89.29 CHRONIC HEEL PAIN, RIGHT: Primary | ICD-10-CM

## 2025-04-14 DIAGNOSIS — M96.0 NONUNION AFTER ARTHRODESIS: ICD-10-CM

## 2025-04-14 DIAGNOSIS — M21.42 PES PLANUS OF BOTH FEET: ICD-10-CM

## 2025-04-14 PROCEDURE — 3080F DIAST BP >= 90 MM HG: CPT

## 2025-04-14 PROCEDURE — G8427 DOCREV CUR MEDS BY ELIG CLIN: HCPCS

## 2025-04-14 PROCEDURE — 4004F PT TOBACCO SCREEN RCVD TLK: CPT

## 2025-04-14 PROCEDURE — 1123F ACP DISCUSS/DSCN MKR DOCD: CPT

## 2025-04-14 PROCEDURE — 1126F AMNT PAIN NOTED NONE PRSNT: CPT

## 2025-04-14 PROCEDURE — 1159F MED LIST DOCD IN RCRD: CPT

## 2025-04-14 PROCEDURE — G8417 CALC BMI ABV UP PARAM F/U: HCPCS

## 2025-04-14 PROCEDURE — 99214 OFFICE O/P EST MOD 30 MIN: CPT

## 2025-04-14 PROCEDURE — 3017F COLORECTAL CA SCREEN DOC REV: CPT

## 2025-04-14 PROCEDURE — 73630 X-RAY EXAM OF FOOT: CPT

## 2025-04-14 PROCEDURE — 3074F SYST BP LT 130 MM HG: CPT

## 2025-04-14 RX ORDER — UREA 40 %
CREAM (GRAM) TOPICAL
Qty: 85 G | Refills: 5 | Status: SHIPPED | OUTPATIENT
Start: 2025-04-14

## 2025-04-14 NOTE — PROGRESS NOTES
[facility]  [address]   [address] [place] [address]  Tel: [phone]   Fax: [phone]    Subjective     Interval: Patient presents     S/p Procedures DOS: 6/27/2022:   1st Metatarsophalanjeal joint arthrodesis, left foot.  Removal of hardware 2nd metatarsal, left foot.  Revision hammertoe correction 2nd digit left foot.  Removal of hardware 3rd metatarsal left foot.  Revision hammertoe correction 3rd digit left foot.  Flexor tenotomy 4th digit left foot.    HPI:  This 61 y.o. male presents for a post op visit.  He had first metatarsophalangeal joint arthrodesis with correction of the 2nd/ 3rd digit; 4th digit flexor tenotomy. Patient is a 1 cig a day smoker, non diabetic. History of Afib, and HTN that is contolled. History of cocaine use in the past, not currently; further alcohol dependence in the past but not currently. Has histroy of GERD for which he recently had colonoscopy. Pain is well controlled at this time.  Has been icing and elevating the extremity as instructed preoperatively and has been weightbearing to the left lower extremity. Denies any current nausea, vomiting, fever, chills, shortness of breath, chest pain or calf pain. Denies any other pedal complaints  Primary care physician is Sandra Tavares MD.    ROS:    Constitutional: Denies nausea, vomiting, fever, chills.  Neurologic: Denies numbness, tingling, and burning in the feet.    Vascular: Denies symptoms of lower extremity claudication.    Skin: Denies open wounds.  Otherwise negative except as noted in the HPI.      Objective     Vitals:    04/14/25 1404   BP: (!) 118/102   Pulse: 53       Lab Results   Component Value Date    LABA1C 6.0 01/10/2025       Physical Exam:  General:  Alert and oriented x3. In no acute distress.     Vascular: DP and PT pulses are palpable. CFT <3 seconds to all digits without pedal edema. Hair growth is absent to the level of the digits.    Neuro: Saph/sural/SP/DP/plantar sensation

## 2025-04-30 DIAGNOSIS — J31.0 RHINITIS, UNSPECIFIED TYPE: ICD-10-CM

## 2025-05-01 NOTE — TELEPHONE ENCOUNTER
Jose Guevara is calling to request a refill on the following medication(s):    Medication Request:  Requested Prescriptions     Pending Prescriptions Disp Refills    fluticasone (FLONASE) 50 MCG/ACT nasal spray [Pharmacy Med Name: FLUTICASONE NASAL SPRAY 50 ANT] 16 g 1     Sig: INHALE 1 SPRAY INTO EACH NOSTRIL DAILY       Last Visit Date (If Applicable):  Visit date not found    Next Visit Date:    Visit date not found

## 2025-05-02 RX ORDER — FLUTICASONE PROPIONATE 50 MCG
SPRAY, SUSPENSION (ML) NASAL
Qty: 16 G | Refills: 1 | Status: SHIPPED | OUTPATIENT
Start: 2025-05-02

## 2025-05-20 DIAGNOSIS — I48.0 PAROXYSMAL ATRIAL FIBRILLATION (HCC): ICD-10-CM

## 2025-05-20 RX ORDER — APIXABAN 5 MG/1
5 TABLET, FILM COATED ORAL 2 TIMES DAILY
Qty: 60 TABLET | Refills: 2 | Status: SHIPPED | OUTPATIENT
Start: 2025-05-20

## 2025-05-20 NOTE — TELEPHONE ENCOUNTER
Jose Guevara is calling to request a refill on the following medication(s):    Medication Request:  Requested Prescriptions     Pending Prescriptions Disp Refills    ELIQUIS 5 MG TABS tablet [Pharmacy Med Name: ELIQUIS 5MG TAB 5 Tablet] 60 tablet 2     Sig: TAKE 1 TABLET BY MOUTH TWICE A DAY       Last Visit Date (If Applicable):  Visit date not found    Next Visit Date:    Visit date not found

## 2025-06-10 RX ORDER — LISINOPRIL 20 MG/1
20 TABLET ORAL DAILY
Qty: 30 TABLET | Refills: 2 | Status: SHIPPED | OUTPATIENT
Start: 2025-06-10

## 2025-06-10 NOTE — TELEPHONE ENCOUNTER
Jose Guevara is calling to request a refill on the following medication(s):    Medication Request:    Lisinopril    Last Visit Date (If Applicable):  Visit date not found    Next Visit Date:    Visit date not found

## 2025-06-17 DIAGNOSIS — E78.5 HYPERLIPIDEMIA, UNSPECIFIED HYPERLIPIDEMIA TYPE: ICD-10-CM

## 2025-06-17 DIAGNOSIS — I10 WELL-CONTROLLED HYPERTENSION: ICD-10-CM

## 2025-06-18 ENCOUNTER — OFFICE VISIT (OUTPATIENT)
Age: 65
End: 2025-06-18
Payer: MEDICARE

## 2025-06-18 VITALS
TEMPERATURE: 97.2 F | BODY MASS INDEX: 30.54 KG/M2 | SYSTOLIC BLOOD PRESSURE: 130 MMHG | OXYGEN SATURATION: 93 % | HEART RATE: 56 BPM | HEIGHT: 74 IN | WEIGHT: 238 LBS | DIASTOLIC BLOOD PRESSURE: 80 MMHG

## 2025-06-18 DIAGNOSIS — Z00.00 ANNUAL WELLNESS VISIT: ICD-10-CM

## 2025-06-18 DIAGNOSIS — E55.9 VITAMIN D DEFICIENCY: ICD-10-CM

## 2025-06-18 DIAGNOSIS — Z00.00 INITIAL MEDICARE ANNUAL WELLNESS VISIT: Primary | ICD-10-CM

## 2025-06-18 PROCEDURE — 1123F ACP DISCUSS/DSCN MKR DOCD: CPT

## 2025-06-18 PROCEDURE — 3079F DIAST BP 80-89 MM HG: CPT

## 2025-06-18 PROCEDURE — G0438 PPPS, INITIAL VISIT: HCPCS

## 2025-06-18 PROCEDURE — 3075F SYST BP GE 130 - 139MM HG: CPT

## 2025-06-18 PROCEDURE — 3017F COLORECTAL CA SCREEN DOC REV: CPT

## 2025-06-18 PROCEDURE — 1159F MED LIST DOCD IN RCRD: CPT

## 2025-06-18 RX ORDER — ATORVASTATIN CALCIUM 40 MG/1
40 TABLET, FILM COATED ORAL NIGHTLY
Qty: 30 TABLET | Refills: 5 | Status: SHIPPED | OUTPATIENT
Start: 2025-06-18

## 2025-06-18 RX ORDER — AMLODIPINE BESYLATE 10 MG/1
10 TABLET ORAL DAILY
Qty: 30 TABLET | Refills: 5 | Status: SHIPPED | OUTPATIENT
Start: 2025-06-18

## 2025-06-18 RX ORDER — CHOLECALCIFEROL (VITAMIN D3) 25 MCG
1000 TABLET ORAL DAILY
Qty: 90 TABLET | Refills: 2 | Status: SHIPPED | OUTPATIENT
Start: 2025-06-18

## 2025-06-18 ASSESSMENT — PATIENT HEALTH QUESTIONNAIRE - PHQ9
SUM OF ALL RESPONSES TO PHQ QUESTIONS 1-9: 0
1. LITTLE INTEREST OR PLEASURE IN DOING THINGS: NOT AT ALL
SUM OF ALL RESPONSES TO PHQ QUESTIONS 1-9: 0
SUM OF ALL RESPONSES TO PHQ QUESTIONS 1-9: 0
2. FEELING DOWN, DEPRESSED OR HOPELESS: NOT AT ALL
SUM OF ALL RESPONSES TO PHQ QUESTIONS 1-9: 0

## 2025-06-18 ASSESSMENT — LIFESTYLE VARIABLES
HOW MANY STANDARD DRINKS CONTAINING ALCOHOL DO YOU HAVE ON A TYPICAL DAY: PATIENT DOES NOT DRINK
HOW OFTEN DO YOU HAVE A DRINK CONTAINING ALCOHOL: MONTHLY OR LESS

## 2025-06-18 NOTE — PROGRESS NOTES
Attending Physician Statement  I have discussed the care of Jose Guevara, including pertinent history and exam findings with the resident. I have reviewed the key elements of all parts of the encounter with the resident. I agree with the assessment, and status of the problem list as documented. The plan and orders should include       Orders Placed This Encounter   Procedures    TSH reflex to FT4    Vitamin B12 & Folate    Basic Metabolic Panel    and this was also documented by the resident.  The medication list was reviewed with the resident and is up to date.      Diagnosis Orders   1. Initial Medicare annual wellness visit        2. Vitamin D deficiency  vitamin D3 (CHOLECALCIFEROL) 25 MCG (1000 UT) TABS tablet      3. Annual wellness visit  TSH reflex to FT4    Vitamin B12 & Folate    Basic Metabolic Panel           Sandra Tavares MD   Attending Physician, [facility]   Faculty, Internal Medicine Residency Program  [facility]

## 2025-06-18 NOTE — PROGRESS NOTES
Medicare Annual Wellness Visit    Jose Guevara is here for Medicare AWV (Pt need clearance to stop his eliquis 7 day prior to procedure on 7/22/25) and Bloated (X 2 month)    Assessment & Plan   Initial Medicare annual wellness visit  Vitamin D deficiency  -     vitamin D3 (CHOLECALCIFEROL) 25 MCG (1000 UT) TABS tablet; Take 1 tablet by mouth daily, Disp-90 tablet, R-2Normal  Annual wellness visit  -     TSH reflex to FT4; Future  -     Vitamin B12 & Folate; Future  -     Basic Metabolic Panel; Future       Return in 6 weeks (on 7/30/2025).     Subjective       Patient's complete Health Risk Assessment and screening values have been reviewed and are found in Flowsheets. The following problems were reviewed today and where indicated follow up appointments were made and/or referrals ordered.    Positive Risk Factor Screenings with Interventions:     Cognitive:   Clock Drawing Test (CDT): Normal  Words recalled: 0 Words Recalled  Total Score: (!) 2  Total Score Interpretation: Abnormal Mini-Cog  Interventions:  Will recheck vitamin B12 at this time.              Abnormal BMI (obese):  Body mass index is 30.56 kg/m². (!) Abnormal  Interventions:  Patient comments: Will try to eat healthy  eliminate/reduce high calorie beverages, high salt intake, fried food, exercise for at least 150 minutes/week          Vision Screen:  Do you have difficulty driving, watching TV, or doing any of your daily activities because of your eyesight?: No  Have you had an eye exam within the past year?: (!) No  Interventions:   Patient comments: He has an upcoming appointment with ophthalmologist next month    Safety:  Do you have non-slip mats or non-slip surfaces or shower bars or grab bars in your shower or bathtub?: (!) No  Interventions:  Patient comments: Patient reported that he will buy nonslip mats for his shower or bathtub soon.     Advanced Directives:  Do you have a Living Will?: (!) No    Intervention:  Patient reported

## 2025-06-18 NOTE — PATIENT INSTRUCTIONS
meets your needs.  You don't have to make a lot of big changes at once. A better idea might be to focus on small changes and stick with them. When those changes become habit, you can add a few more changes.  Some people find it helpful to take an exercise or nutrition class. If you have questions, ask your doctor about seeing a registered dietitian or an exercise specialist. You might also think about joining a weight-loss support group.  If you're not ready to make changes right now, try to pick a date in the future. Then make an appointment with your doctor to talk about when and how you'll get started with a plan.  Follow-up care is a key part of your treatment and safety. Be sure to make and go to all appointments, and call your doctor if you are having problems. It's also a good idea to know your test results and keep a list of the medicines you take.  How can you care for yourself as you start a weight-loss plan?   Set realistic goals. Many people expect to lose much more weight than is likely. A weight loss of 5% to 10% of your body weight may be enough to improve your health.  Get family and friends involved to provide support. Talk to them about why you are trying to lose weight, and ask them to help. They can help by participating in exercise and having meals with you, even if they may be eating something different.  Find what works best for you. If you do not have time or do not like to cook, a program that offers meal replacement bars or shakes may be better for you. Or if you like to prepare meals, finding a plan that includes daily menus and recipes may be best.  Ask your doctor about other health professionals who can help you achieve your weight-loss goals.  A dietitian can help you make healthy changes in your diet.  An exercise specialist or  can help you develop a safe and effective exercise program.  A counselor or psychiatrist can help you cope with issues such as depression,

## 2025-06-18 NOTE — TELEPHONE ENCOUNTER
Jose Guevara is calling to request a refill on the following medication(s):    Medication Request:  Multiple meds     Last Visit Date (If Applicable):  Visit date not found    Next Visit Date:    6/18/2025

## 2025-06-20 ENCOUNTER — HOSPITAL ENCOUNTER (OUTPATIENT)
Age: 65
Discharge: HOME | End: 2025-06-20
Payer: MEDICARE

## 2025-06-20 DIAGNOSIS — Z00.00 ANNUAL WELLNESS VISIT: ICD-10-CM

## 2025-06-20 LAB
ANION GAP SERPL CALCULATED.3IONS-SCNC: 10 MMOL/L (ref 9–16)
BUN SERPL-MCNC: 12 MG/DL (ref 8–23)
CALCIUM SERPL-MCNC: 9.3 MG/DL (ref 8.6–10.4)
CHLORIDE SERPL-SCNC: 106 MMOL/L (ref 98–107)
CO2 SERPL-SCNC: 26 MMOL/L (ref 20–31)
CREAT SERPL-MCNC: 1.1 MG/DL (ref 0.7–1.2)
FOLATE SERPL-MCNC: 12.8 NG/ML (ref 4.8–24.2)
GFR, ESTIMATED: 74 ML/MIN/1.73M2
GLUCOSE SERPL-MCNC: 84 MG/DL (ref 74–99)
POTASSIUM SERPL-SCNC: 4.1 MMOL/L (ref 3.7–5.3)
SODIUM SERPL-SCNC: 142 MMOL/L (ref 136–145)
TSH SERPL DL<=0.05 MIU/L-ACNC: 2.46 UIU/ML (ref 0.27–4.2)
VIT B12 SERPL-MCNC: 367 PG/ML (ref 232–1245)

## 2025-06-20 PROCEDURE — 84443 ASSAY THYROID STIM HORMONE: CPT

## 2025-06-20 PROCEDURE — 82607 VITAMIN B-12: CPT

## 2025-06-20 PROCEDURE — 82746 ASSAY OF FOLIC ACID SERUM: CPT

## 2025-06-20 PROCEDURE — 80048 BASIC METABOLIC PNL TOTAL CA: CPT

## 2025-06-20 PROCEDURE — 36415 COLL VENOUS BLD VENIPUNCTURE: CPT

## 2025-07-05 ENCOUNTER — RESULTS FOLLOW-UP (OUTPATIENT)
Dept: INTERNAL MEDICINE CLINIC | Age: 65
End: 2025-07-05

## 2025-07-21 ENCOUNTER — TELEPHONE (OUTPATIENT)
Age: 65
End: 2025-07-21

## 2025-07-21 NOTE — TELEPHONE ENCOUNTER
Patient states he needs Dr. Tavares to call his dentist for a clearance. Patient was notified that he needs to come into office for medical clearance.

## 2025-07-25 NOTE — TELEPHONE ENCOUNTER
Suzanne      Pt was called again by another medical assistant who he hung up on and refused to schedule a appointment for surgical clearance, pt last seen in June of 2025 for AWV , he will need to be seen, form has been scan to [place] and [facility] WHERE PT WAS TO BE SEEN FOR DENTAL TREATMENT WAS INFORMED.

## 2025-07-29 DIAGNOSIS — I48.0 PAROXYSMAL ATRIAL FIBRILLATION (HCC): ICD-10-CM

## 2025-07-30 RX ORDER — APIXABAN 5 MG/1
5 TABLET, FILM COATED ORAL 2 TIMES DAILY
Qty: 60 TABLET | Refills: 2 | Status: SHIPPED | OUTPATIENT
Start: 2025-07-30

## 2025-07-30 NOTE — TELEPHONE ENCOUNTER
Jose Guevara is calling to request a refill on the following medication(s):    Medication Request:  Requested Prescriptions     Pending Prescriptions Disp Refills    ELIQUIS 5 MG TABS tablet [Pharmacy Med Name: ELIQUIS 5MG TAB 5 Tablet] 60 tablet 2     Sig: TAKE 1 TABLET BY MOUTH TWICE A DAY       Last Visit Date (If Applicable):  6/18/2025    Next Visit Date:    8/12/2025

## 2025-08-06 DIAGNOSIS — I48.0 PAROXYSMAL ATRIAL FIBRILLATION (HCC): ICD-10-CM

## 2025-08-06 RX ORDER — METOPROLOL TARTRATE 50 MG
50 TABLET ORAL 2 TIMES DAILY
Qty: 60 TABLET | Refills: 5 | Status: SHIPPED | OUTPATIENT
Start: 2025-08-06

## 2025-08-08 ENCOUNTER — APPOINTMENT (OUTPATIENT)
Dept: CT IMAGING | Age: 65
End: 2025-08-08
Payer: MEDICARE

## 2025-08-08 ENCOUNTER — HOSPITAL ENCOUNTER (EMERGENCY)
Age: 65
Discharge: HOME | End: 2025-08-08
Attending: EMERGENCY MEDICINE
Payer: MEDICARE

## 2025-08-08 ENCOUNTER — APPOINTMENT (OUTPATIENT)
Dept: GENERAL RADIOLOGY | Age: 65
End: 2025-08-08
Payer: MEDICARE

## 2025-08-08 VITALS
DIASTOLIC BLOOD PRESSURE: 87 MMHG | HEART RATE: 58 BPM | SYSTOLIC BLOOD PRESSURE: 124 MMHG | TEMPERATURE: 97.9 F | WEIGHT: 250 LBS | OXYGEN SATURATION: 92 % | RESPIRATION RATE: 18 BRPM | BODY MASS INDEX: 32.1 KG/M2

## 2025-08-08 DIAGNOSIS — R07.81 RIB PAIN ON RIGHT SIDE: Primary | ICD-10-CM

## 2025-08-08 DIAGNOSIS — R05.1 ACUTE COUGH: ICD-10-CM

## 2025-08-08 LAB
ALBUMIN SERPL-MCNC: 4.2 G/DL (ref 3.5–5.2)
ALBUMIN/GLOB SERPL: 1.1 {RATIO} (ref 1–2.5)
ALP SERPL-CCNC: 92 U/L (ref 40–129)
ALT SERPL-CCNC: 10 U/L (ref 10–50)
ANION GAP SERPL CALCULATED.3IONS-SCNC: 10 MMOL/L (ref 9–16)
AST SERPL-CCNC: 18 U/L (ref 10–50)
BASOPHILS # BLD: <0.03 K/UL (ref 0–0.2)
BASOPHILS NFR BLD: 0 % (ref 0–2)
BILIRUB SERPL-MCNC: 1 MG/DL (ref 0–1.2)
BUN SERPL-MCNC: 9 MG/DL (ref 8–23)
CALCIUM SERPL-MCNC: 9.5 MG/DL (ref 8.6–10.4)
CHLORIDE SERPL-SCNC: 104 MMOL/L (ref 98–107)
CO2 SERPL-SCNC: 26 MMOL/L (ref 20–31)
CREAT SERPL-MCNC: 1.3 MG/DL (ref 0.7–1.2)
EOSINOPHIL # BLD: 0.05 K/UL (ref 0–0.44)
EOSINOPHILS RELATIVE PERCENT: 1 % (ref 1–4)
ERYTHROCYTE [DISTWIDTH] IN BLOOD BY AUTOMATED COUNT: 13.4 % (ref 11.8–14.4)
GFR, ESTIMATED: 61 ML/MIN/1.73M2
GLUCOSE SERPL-MCNC: 96 MG/DL (ref 74–99)
HCT VFR BLD AUTO: 42 % (ref 40.7–50.3)
HGB BLD-MCNC: 13.6 G/DL (ref 13–17)
IMM GRANULOCYTES # BLD AUTO: <0.03 K/UL (ref 0–0.3)
IMM GRANULOCYTES NFR BLD: 0 %
LYMPHOCYTES NFR BLD: 1.45 K/UL (ref 1.1–3.7)
LYMPHOCYTES RELATIVE PERCENT: 24 % (ref 24–43)
MCH RBC QN AUTO: 27.9 PG (ref 25.2–33.5)
MCHC RBC AUTO-ENTMCNC: 32.4 G/DL (ref 28.4–34.8)
MCV RBC AUTO: 86.1 FL (ref 82.6–102.9)
MONOCYTES NFR BLD: 0.93 K/UL (ref 0.1–1.2)
MONOCYTES NFR BLD: 15 % (ref 3–12)
NEUTROPHILS NFR BLD: 60 % (ref 36–65)
NEUTS SEG NFR BLD: 3.7 K/UL (ref 1.5–8.1)
NRBC BLD-RTO: 0 PER 100 WBC
PLATELET # BLD AUTO: 194 K/UL (ref 138–453)
PMV BLD AUTO: 9.2 FL (ref 8.1–13.5)
POTASSIUM SERPL-SCNC: 3.8 MMOL/L (ref 3.7–5.3)
PROT SERPL-MCNC: 8.2 G/DL (ref 6.6–8.7)
RBC # BLD AUTO: 4.88 M/UL (ref 4.21–5.77)
SODIUM SERPL-SCNC: 140 MMOL/L (ref 136–145)
TROPONIN I SERPL HS-MCNC: 7 NG/L (ref 0–22)
TROPONIN I SERPL HS-MCNC: 9 NG/L (ref 0–22)
WBC OTHER # BLD: 6.2 K/UL (ref 3.5–11.3)

## 2025-08-08 PROCEDURE — 80053 COMPREHEN METABOLIC PANEL: CPT

## 2025-08-08 PROCEDURE — 6360000004 HC RX CONTRAST MEDICATION

## 2025-08-08 PROCEDURE — 6370000000 HC RX 637 (ALT 250 FOR IP)

## 2025-08-08 PROCEDURE — 71260 CT THORAX DX C+: CPT

## 2025-08-08 PROCEDURE — 71046 X-RAY EXAM CHEST 2 VIEWS: CPT

## 2025-08-08 PROCEDURE — 99285 EMERGENCY DEPT VISIT HI MDM: CPT | Performed by: EMERGENCY MEDICINE

## 2025-08-08 PROCEDURE — 84484 ASSAY OF TROPONIN QUANT: CPT

## 2025-08-08 PROCEDURE — 85025 COMPLETE CBC W/AUTO DIFF WBC: CPT

## 2025-08-08 PROCEDURE — 93005 ELECTROCARDIOGRAM TRACING: CPT

## 2025-08-08 RX ORDER — LIDOCAINE 4 G/G
1 PATCH TOPICAL DAILY
Qty: 30 PATCH | Refills: 0 | Status: SHIPPED | OUTPATIENT
Start: 2025-08-08 | End: 2025-09-07

## 2025-08-08 RX ORDER — IOPAMIDOL 755 MG/ML
75 INJECTION, SOLUTION INTRAVASCULAR
Status: COMPLETED | OUTPATIENT
Start: 2025-08-08 | End: 2025-08-08

## 2025-08-08 RX ORDER — ACETAMINOPHEN 500 MG
1000 TABLET ORAL ONCE
Status: DISCONTINUED | OUTPATIENT
Start: 2025-08-08 | End: 2025-08-08 | Stop reason: HOSPADM

## 2025-08-08 RX ORDER — LIDOCAINE 4 G/G
1 PATCH TOPICAL ONCE
Status: DISCONTINUED | OUTPATIENT
Start: 2025-08-08 | End: 2025-08-08 | Stop reason: HOSPADM

## 2025-08-08 RX ADMIN — IOPAMIDOL 75 ML: 755 INJECTION, SOLUTION INTRAVENOUS at 12:22

## 2025-08-08 ASSESSMENT — PAIN DESCRIPTION - ORIENTATION: ORIENTATION: RIGHT;MID;LOWER

## 2025-08-08 ASSESSMENT — PAIN DESCRIPTION - DESCRIPTORS: DESCRIPTORS: DULL

## 2025-08-08 ASSESSMENT — LIFESTYLE VARIABLES: HOW OFTEN DO YOU HAVE A DRINK CONTAINING ALCOHOL: NEVER

## 2025-08-08 ASSESSMENT — PAIN - FUNCTIONAL ASSESSMENT: PAIN_FUNCTIONAL_ASSESSMENT: 0-10

## 2025-08-08 ASSESSMENT — PAIN DESCRIPTION - LOCATION: LOCATION: RIB CAGE

## 2025-08-08 ASSESSMENT — PAIN DESCRIPTION - PAIN TYPE: TYPE: ACUTE PAIN

## 2025-08-09 LAB
EKG ATRIAL RATE: 54 BPM
EKG P AXIS: 50 DEGREES
EKG P-R INTERVAL: 164 MS
EKG Q-T INTERVAL: 440 MS
EKG QRS DURATION: 86 MS
EKG QTC CALCULATION (BAZETT): 417 MS
EKG R AXIS: 21 DEGREES
EKG T AXIS: 27 DEGREES
EKG VENTRICULAR RATE: 54 BPM

## 2025-08-15 ENCOUNTER — OFFICE VISIT (OUTPATIENT)
Age: 65
End: 2025-08-15
Payer: MEDICARE

## 2025-08-15 VITALS
HEART RATE: 58 BPM | OXYGEN SATURATION: 98 % | TEMPERATURE: 97.2 F | BODY MASS INDEX: 30.42 KG/M2 | DIASTOLIC BLOOD PRESSURE: 78 MMHG | SYSTOLIC BLOOD PRESSURE: 128 MMHG | WEIGHT: 237 LBS | HEIGHT: 74 IN

## 2025-08-15 DIAGNOSIS — Z01.818 PREOPERATIVE CLEARANCE: Primary | ICD-10-CM

## 2025-08-15 DIAGNOSIS — R73.03 PRE-DIABETES: ICD-10-CM

## 2025-08-15 DIAGNOSIS — I48.0 PAROXYSMAL ATRIAL FIBRILLATION (HCC): ICD-10-CM

## 2025-08-15 DIAGNOSIS — I10 WELL-CONTROLLED HYPERTENSION: ICD-10-CM

## 2025-08-15 PROCEDURE — 3078F DIAST BP <80 MM HG: CPT | Performed by: STUDENT IN AN ORGANIZED HEALTH CARE EDUCATION/TRAINING PROGRAM

## 2025-08-15 PROCEDURE — 99214 OFFICE O/P EST MOD 30 MIN: CPT | Performed by: STUDENT IN AN ORGANIZED HEALTH CARE EDUCATION/TRAINING PROGRAM

## 2025-08-15 PROCEDURE — G8417 CALC BMI ABV UP PARAM F/U: HCPCS | Performed by: STUDENT IN AN ORGANIZED HEALTH CARE EDUCATION/TRAINING PROGRAM

## 2025-08-15 PROCEDURE — 1123F ACP DISCUSS/DSCN MKR DOCD: CPT | Performed by: STUDENT IN AN ORGANIZED HEALTH CARE EDUCATION/TRAINING PROGRAM

## 2025-08-15 PROCEDURE — 4004F PT TOBACCO SCREEN RCVD TLK: CPT | Performed by: STUDENT IN AN ORGANIZED HEALTH CARE EDUCATION/TRAINING PROGRAM

## 2025-08-15 PROCEDURE — 3074F SYST BP LT 130 MM HG: CPT | Performed by: STUDENT IN AN ORGANIZED HEALTH CARE EDUCATION/TRAINING PROGRAM

## 2025-08-15 PROCEDURE — G8427 DOCREV CUR MEDS BY ELIG CLIN: HCPCS | Performed by: STUDENT IN AN ORGANIZED HEALTH CARE EDUCATION/TRAINING PROGRAM

## 2025-08-15 PROCEDURE — 1159F MED LIST DOCD IN RCRD: CPT | Performed by: STUDENT IN AN ORGANIZED HEALTH CARE EDUCATION/TRAINING PROGRAM

## 2025-08-15 PROCEDURE — 3017F COLORECTAL CA SCREEN DOC REV: CPT | Performed by: STUDENT IN AN ORGANIZED HEALTH CARE EDUCATION/TRAINING PROGRAM

## 2025-08-15 ASSESSMENT — ENCOUNTER SYMPTOMS
CONSTIPATION: 0
NAUSEA: 0
VOMITING: 0
ABDOMINAL PAIN: 0
CHEST TIGHTNESS: 0
WHEEZING: 0
SHORTNESS OF BREATH: 0
DIARRHEA: 0
COUGH: 0

## 2025-08-15 ASSESSMENT — PATIENT HEALTH QUESTIONNAIRE - PHQ9
10. IF YOU CHECKED OFF ANY PROBLEMS, HOW DIFFICULT HAVE THESE PROBLEMS MADE IT FOR YOU TO DO YOUR WORK, TAKE CARE OF THINGS AT HOME, OR GET ALONG WITH OTHER PEOPLE: NOT DIFFICULT AT ALL
4. FEELING TIRED OR HAVING LITTLE ENERGY: NOT AT ALL
7. TROUBLE CONCENTRATING ON THINGS, SUCH AS READING THE NEWSPAPER OR WATCHING TELEVISION: NOT AT ALL
SUM OF ALL RESPONSES TO PHQ QUESTIONS 1-9: 0
2. FEELING DOWN, DEPRESSED OR HOPELESS: NOT AT ALL
SUM OF ALL RESPONSES TO PHQ QUESTIONS 1-9: 0
5. POOR APPETITE OR OVEREATING: NOT AT ALL
SUM OF ALL RESPONSES TO PHQ QUESTIONS 1-9: 0
6. FEELING BAD ABOUT YOURSELF - OR THAT YOU ARE A FAILURE OR HAVE LET YOURSELF OR YOUR FAMILY DOWN: NOT AT ALL
1. LITTLE INTEREST OR PLEASURE IN DOING THINGS: NOT AT ALL
8. MOVING OR SPEAKING SO SLOWLY THAT OTHER PEOPLE COULD HAVE NOTICED. OR THE OPPOSITE, BEING SO FIGETY OR RESTLESS THAT YOU HAVE BEEN MOVING AROUND A LOT MORE THAN USUAL: NOT AT ALL
3. TROUBLE FALLING OR STAYING ASLEEP: NOT AT ALL
SUM OF ALL RESPONSES TO PHQ QUESTIONS 1-9: 0
9. THOUGHTS THAT YOU WOULD BE BETTER OFF DEAD, OR OF HURTING YOURSELF: NOT AT ALL

## 2025-08-18 DIAGNOSIS — J31.0 RHINITIS, UNSPECIFIED TYPE: ICD-10-CM

## 2025-08-19 RX ORDER — FLUTICASONE PROPIONATE 50 MCG
SPRAY, SUSPENSION (ML) NASAL
Qty: 16 G | Refills: 1 | Status: SHIPPED | OUTPATIENT
Start: 2025-08-19

## 2025-08-19 RX ORDER — LISINOPRIL 20 MG/1
20 TABLET ORAL DAILY
Qty: 30 TABLET | Refills: 2 | Status: SHIPPED | OUTPATIENT
Start: 2025-08-19

## (undated) DEVICE — 3.0MM DEPTH GAUGE/ COUNTERSINK: Brand: MINI

## (undated) DEVICE — KIRSCHNER WIRE , L, TIPS TROCAR , SMOOTH
Type: IMPLANTABLE DEVICE | Site: TOES | Status: NON-FUNCTIONAL
Removed: 2022-06-27

## (undated) DEVICE — SMALL TEAR CROSS CUT RASP (11.0 X 5.0MM)

## (undated) DEVICE — BANDAGE,GAUZE,BULKEE II,4.5"X4.1YD,STRL: Brand: MEDLINE

## (undated) DEVICE — CANNULATED DRILL BIT: Brand: MINI

## (undated) DEVICE — NON-THREADED KWIRE
Type: IMPLANTABLE DEVICE | Site: TOES | Status: NON-FUNCTIONAL
Brand: MINI
Removed: 2022-06-27

## (undated) DEVICE — NEEDLE HYPO 25GA L1.5IN BLU POLYPR HUB S STL REG BVL STR

## (undated) DEVICE — BIT DRL L110MM DIA2MM STD RMR ADD ON DISP

## (undated) DEVICE — BALL PIN PROTCT DK BLU FOR 0.035IN WIRE

## (undated) DEVICE — SVMMC POD PK

## (undated) DEVICE — STANDARD DRILL BIT

## (undated) DEVICE — CONVEX REAMER

## (undated) DEVICE — GLOVE ORTHO 8   MSG9480

## (undated) DEVICE — HOLDING PIN: Brand: ANCHORAGE

## (undated) DEVICE — CONCAVE SURFACING REAMER